# Patient Record
Sex: FEMALE | Race: WHITE | NOT HISPANIC OR LATINO | Employment: UNEMPLOYED | URBAN - METROPOLITAN AREA
[De-identification: names, ages, dates, MRNs, and addresses within clinical notes are randomized per-mention and may not be internally consistent; named-entity substitution may affect disease eponyms.]

---

## 2017-07-07 ENCOUNTER — TRANSCRIBE ORDERS (OUTPATIENT)
Dept: ADMINISTRATIVE | Facility: HOSPITAL | Age: 57
End: 2017-07-07

## 2017-07-07 DIAGNOSIS — D31.42 BENIGN NEOPLASM OF LEFT CILIARY BODY: ICD-10-CM

## 2017-07-07 DIAGNOSIS — H57.12 PAIN IN LEFT EYE: Primary | ICD-10-CM

## 2017-07-26 ENCOUNTER — HOSPITAL ENCOUNTER (OUTPATIENT)
Dept: CT IMAGING | Facility: HOSPITAL | Age: 57
Discharge: HOME | End: 2017-07-26
Payer: COMMERCIAL

## 2017-07-26 DIAGNOSIS — D31.42 BENIGN NEOPLASM OF LEFT CILIARY BODY: ICD-10-CM

## 2017-07-26 DIAGNOSIS — H57.12 PAIN IN LEFT EYE: ICD-10-CM

## 2017-07-26 PROCEDURE — 70480 CT ORBIT/EAR/FOSSA W/O DYE: CPT

## 2018-06-15 ENCOUNTER — OFFICE VISIT (OUTPATIENT)
Dept: FAMILY MEDICINE CLINIC | Facility: CLINIC | Age: 58
End: 2018-06-15
Payer: COMMERCIAL

## 2018-06-15 VITALS
RESPIRATION RATE: 18 BRPM | SYSTOLIC BLOOD PRESSURE: 126 MMHG | OXYGEN SATURATION: 98 % | WEIGHT: 169 LBS | HEART RATE: 76 BPM | TEMPERATURE: 98.7 F | BODY MASS INDEX: 33.18 KG/M2 | HEIGHT: 60 IN | DIASTOLIC BLOOD PRESSURE: 82 MMHG

## 2018-06-15 DIAGNOSIS — Z01.419 WELL WOMAN EXAM: ICD-10-CM

## 2018-06-15 DIAGNOSIS — Z12.39 BREAST CANCER SCREENING: ICD-10-CM

## 2018-06-15 DIAGNOSIS — Z13.220 NEED FOR LIPID SCREENING: ICD-10-CM

## 2018-06-15 DIAGNOSIS — E53.8 B12 DEFICIENCY: ICD-10-CM

## 2018-06-15 DIAGNOSIS — Z11.59 NEED FOR HEPATITIS C SCREENING TEST: ICD-10-CM

## 2018-06-15 DIAGNOSIS — Z13.1 DIABETES MELLITUS SCREENING: ICD-10-CM

## 2018-06-15 DIAGNOSIS — R05.9 COUGH: Primary | ICD-10-CM

## 2018-06-15 PROCEDURE — 99214 OFFICE O/P EST MOD 30 MIN: CPT | Performed by: FAMILY MEDICINE

## 2018-06-15 RX ORDER — MONTELUKAST SODIUM 10 MG/1
10 TABLET ORAL
Qty: 30 TABLET | Refills: 1 | Status: SHIPPED | OUTPATIENT
Start: 2018-06-15 | End: 2018-10-19 | Stop reason: SDUPTHER

## 2018-06-15 RX ORDER — DEXTROMETHORPHAN HYDROBROMIDE AND PROMETHAZINE HYDROCHLORIDE 15; 6.25 MG/5ML; MG/5ML
5 SYRUP ORAL 4 TIMES DAILY PRN
Qty: 118 ML | Refills: 1 | Status: SHIPPED | OUTPATIENT
Start: 2018-06-15 | End: 2019-05-02

## 2018-06-15 NOTE — PROGRESS NOTES
Assessment/Plan:    No problem-specific Assessment & Plan notes found for this encounter  Diagnoses and all orders for this visit:    Cough  after discussing risks and benefits of medication along with side effects will start Singulair at this time as well as cough medication and advised to use Flonase daily  -     montelukast (SINGULAIR) 10 mg tablet; Take 1 tablet (10 mg total) by mouth daily at bedtime for 30 days  -     promethazine-dextromethorphan (PHENERGAN-DM) 6 25-15 mg/5 mL oral syrup; Take 5 mL by mouth 4 (four) times a day as needed for cough  -     [facility]ergy Panel, Adult; Future  -     Food Specific IgG Allergy (Adult) Panel; Future    Need for lipid screening  -     Lipid panel; Future    Diabetes mellitus screening  -     Comprehensive metabolic panel; Future    Need for hepatitis C screening test  -     Hepatitis C antibody; Future    Breast cancer screening  -     Mammo screening bilateral w cad; Future    Well woman exam  -     Ambulatory referral to Obstetrics / Gynecology; Future      Follow up in 1 month if symptoms continue    Subjective:      Patient ID: Felisha Varela is a 66 y o  female  Cough  Patient complains of nasal congestion, nonproductive cough and sneezing  Symptoms began several weeks ago  Symptoms have been gradually worsening since that time  The cough is nonproductive and is aggravated by pollens  Associated symptoms include: postnasal drip  Patient does not have new pets  Patient does not have a history of asthma  Patient does have a history of environmental allergens  Patient has not traveled recently  Patient does not have a history of smoking  Patient has not had a previous chest x-ray  The following portions of the patient's history were reviewed and updated as appropriate:   She  has a past medical history of Seasonal allergies    She   Patient Active Problem List    Diagnosis Date Noted    Cough 06/15/2018    Need for lipid screening 06/15/2018    Diabetes mellitus screening 06/15/2018    Need for hepatitis C screening test 06/15/2018    Breast cancer screening 06/15/2018    Well woman exam 06/15/2018     She  has a past surgical history that includes  section and Knee surgery  Her family history is not on file  She  reports that she has never smoked  She does not have any smokeless tobacco history on file  She reports that she does not drink alcohol or use drugs  Current Outpatient Prescriptions   Medication Sig Dispense Refill    montelukast (SINGULAIR) 10 mg tablet Take 1 tablet (10 mg total) by mouth daily at bedtime for 30 days 30 tablet 1    promethazine-dextromethorphan (PHENERGAN-DM) 6 25-15 mg/5 mL oral syrup Take 5 mL by mouth 4 (four) times a day as needed for cough 118 mL 1     No current facility-administered medications for this visit  No current outpatient prescriptions on file prior to visit  No current facility-administered medications on file prior to visit  She has No Known Allergies       Review of Systems   Constitutional: Negative for activity change, appetite change, fatigue and fever  HENT: Positive for postnasal drip, rhinorrhea, sinus pain and sneezing  Negative for congestion and ear discharge  Respiratory: Positive for cough  Negative for shortness of breath  Cardiovascular: Negative for chest pain and palpitations  Gastrointestinal: Negative for diarrhea and nausea  Musculoskeletal: Negative for arthralgias and back pain  Skin: Negative for color change and rash  Neurological: Negative for dizziness and headaches  Psychiatric/Behavioral: Negative for agitation and behavioral problems  Objective:      /82   Pulse 76   Temp 98 7 °F (37 1 °C)   Resp 18   Ht 5' (1 524 m)   Wt 76 7 kg (169 lb)   SpO2 98%   BMI 33 01 kg/m²          Physical Exam   Constitutional: She is oriented to person, place, and time  She appears well-developed and well-nourished   No distress  HENT:   Head: Normocephalic and atraumatic  Nose: Nose normal    Mouth/Throat: Oropharynx is clear and moist    Eyes: Conjunctivae are normal  Pupils are equal, round, and reactive to light  Cardiovascular: Normal rate, regular rhythm and normal heart sounds  No murmur heard  Pulmonary/Chest: Effort normal and breath sounds normal  No respiratory distress  She has no wheezes  Abdominal: Soft  Bowel sounds are normal  She exhibits no distension  There is no tenderness  Neurological: She is alert and oriented to person, place, and time  Skin: Skin is warm and dry  No rash noted  She is not diaphoretic  No erythema  Psychiatric: She has a normal mood and affect

## 2018-08-23 ENCOUNTER — OFFICE VISIT (OUTPATIENT)
Dept: FAMILY MEDICINE CLINIC | Facility: CLINIC | Age: 58
End: 2018-08-23
Payer: COMMERCIAL

## 2018-08-23 ENCOUNTER — APPOINTMENT (OUTPATIENT)
Dept: LAB | Facility: CLINIC | Age: 58
End: 2018-08-23
Payer: COMMERCIAL

## 2018-08-23 ENCOUNTER — TRANSCRIBE ORDERS (OUTPATIENT)
Dept: ADMINISTRATIVE | Facility: HOSPITAL | Age: 58
End: 2018-08-23

## 2018-08-23 VITALS
HEIGHT: 60 IN | OXYGEN SATURATION: 99 % | HEART RATE: 74 BPM | BODY MASS INDEX: 32.98 KG/M2 | SYSTOLIC BLOOD PRESSURE: 120 MMHG | WEIGHT: 168 LBS | TEMPERATURE: 98.2 F | RESPIRATION RATE: 16 BRPM | DIASTOLIC BLOOD PRESSURE: 82 MMHG

## 2018-08-23 DIAGNOSIS — R05.9 COUGH: ICD-10-CM

## 2018-08-23 DIAGNOSIS — Z13.1 DIABETES MELLITUS SCREENING: ICD-10-CM

## 2018-08-23 DIAGNOSIS — J01.00 ACUTE MAXILLARY SINUSITIS, RECURRENCE NOT SPECIFIED: Primary | ICD-10-CM

## 2018-08-23 DIAGNOSIS — Z13.220 NEED FOR LIPID SCREENING: ICD-10-CM

## 2018-08-23 DIAGNOSIS — E53.8 B12 DEFICIENCY: ICD-10-CM

## 2018-08-23 DIAGNOSIS — Z11.59 NEED FOR HEPATITIS C SCREENING TEST: ICD-10-CM

## 2018-08-23 LAB
ALBUMIN SERPL BCP-MCNC: 3.6 G/DL (ref 3.5–5)
ALP SERPL-CCNC: 84 U/L (ref 46–116)
ALT SERPL W P-5'-P-CCNC: 17 U/L (ref 12–78)
ANION GAP SERPL CALCULATED.3IONS-SCNC: 7 MMOL/L (ref 4–13)
AST SERPL W P-5'-P-CCNC: 14 U/L (ref 5–45)
BILIRUB SERPL-MCNC: 0.44 MG/DL (ref 0.2–1)
BUN SERPL-MCNC: 9 MG/DL (ref 5–25)
CALCIUM SERPL-MCNC: 9 MG/DL (ref 8.3–10.1)
CHLORIDE SERPL-SCNC: 104 MMOL/L (ref 100–108)
CHOLEST SERPL-MCNC: 182 MG/DL (ref 50–200)
CO2 SERPL-SCNC: 26 MMOL/L (ref 21–32)
CREAT SERPL-MCNC: 0.79 MG/DL (ref 0.6–1.3)
GFR SERPL CREATININE-BSD FRML MDRD: 84 ML/MIN/1.73SQ M
GLUCOSE P FAST SERPL-MCNC: 92 MG/DL (ref 65–99)
HDLC SERPL-MCNC: 72 MG/DL (ref 40–60)
LDLC SERPL CALC-MCNC: 94 MG/DL (ref 0–100)
NONHDLC SERPL-MCNC: 110 MG/DL
POTASSIUM SERPL-SCNC: 3.6 MMOL/L (ref 3.5–5.3)
PROT SERPL-MCNC: 7.6 G/DL (ref 6.4–8.2)
SODIUM SERPL-SCNC: 137 MMOL/L (ref 136–145)
TRIGL SERPL-MCNC: 81 MG/DL
VIT B12 SERPL-MCNC: 275 PG/ML (ref 100–900)

## 2018-08-23 PROCEDURE — 82785 ASSAY OF IGE: CPT

## 2018-08-23 PROCEDURE — 80061 LIPID PANEL: CPT

## 2018-08-23 PROCEDURE — 1036F TOBACCO NON-USER: CPT | Performed by: FAMILY MEDICINE

## 2018-08-23 PROCEDURE — 82607 VITAMIN B-12: CPT

## 2018-08-23 PROCEDURE — 3008F BODY MASS INDEX DOCD: CPT | Performed by: FAMILY MEDICINE

## 2018-08-23 PROCEDURE — 86003 ALLG SPEC IGE CRUDE XTRC EA: CPT

## 2018-08-23 PROCEDURE — 80053 COMPREHEN METABOLIC PANEL: CPT

## 2018-08-23 PROCEDURE — 99214 OFFICE O/P EST MOD 30 MIN: CPT | Performed by: FAMILY MEDICINE

## 2018-08-23 PROCEDURE — 86803 HEPATITIS C AB TEST: CPT

## 2018-08-23 PROCEDURE — 36415 COLL VENOUS BLD VENIPUNCTURE: CPT

## 2018-08-23 RX ORDER — AMOXICILLIN AND CLAVULANATE POTASSIUM 875; 125 MG/1; MG/1
1 TABLET, FILM COATED ORAL EVERY 12 HOURS SCHEDULED
Qty: 10 TABLET | Refills: 0 | Status: SHIPPED | OUTPATIENT
Start: 2018-08-23 | End: 2018-08-28

## 2018-08-23 RX ORDER — METHYLPREDNISOLONE 4 MG/1
TABLET ORAL
Qty: 21 TABLET | Refills: 0 | Status: SHIPPED | OUTPATIENT
Start: 2018-08-23 | End: 2019-05-02

## 2018-08-23 NOTE — PROGRESS NOTES
Assessment/Plan:    No problem-specific Assessment & Plan notes found for this encounter  Diagnoses and all orders for this visit:    Acute maxillary sinusitis, recurrence not specified  Aftre discussing risks and benefits of medication along with side effects with patient  Will start Augmentin to cover for possible bacterial infection  Also Medrol Dosepak for inflammation  -     amoxicillin-clavulanate (AUGMENTIN) 875-125 mg per tablet; Take 1 tablet by mouth every 12 (twelve) hours for 5 days  -     Methylprednisolone 4 MG TBPK; Use as directed on package      Follow up in 1 week or as needed    Subjective:      Patient ID: Joycelyn Deleon is a 66 y o  female  Sinus Pain  Patient complains of headaches, nasal congestion, post nasal drip, sinus pressure and sore throat  Onset of symptoms was a few days ago  Symptoms have been gradually worsening since that time  She is drinking plenty of fluids  Past history is significant for nothing  Patient is non-smoker  She is currently taking Singulair for allergies along with Flonase nasal spray  The following portions of the patient's history were reviewed and updated as appropriate:   She  has a past medical history of Seasonal allergies and Torn medial meniscus  She   Patient Active Problem List    Diagnosis Date Noted    Acute maxillary sinusitis 2018    Cough 06/15/2018    Need for lipid screening 06/15/2018    Diabetes mellitus screening 06/15/2018    Need for hepatitis C screening test 06/15/2018    Breast cancer screening 06/15/2018    Well woman exam 06/15/2018     She  has a past surgical history that includes  section; Knee surgery; Replacement total knee; and Knee arthroscopy, medial patello femoral ligament repair  Her family history includes Hypertension in her mother; No Known Problems in her father  She  reports that she has never smoked   She has never used smokeless tobacco  She reports that she does not drink alcohol or use drugs  Current Outpatient Prescriptions   Medication Sig Dispense Refill    amoxicillin-clavulanate (AUGMENTIN) 875-125 mg per tablet Take 1 tablet by mouth every 12 (twelve) hours for 5 days 10 tablet 0    Methylprednisolone 4 MG TBPK Use as directed on package 21 tablet 0    montelukast (SINGULAIR) 10 mg tablet Take 1 tablet (10 mg total) by mouth daily at bedtime for 30 days 30 tablet 1    promethazine-dextromethorphan (PHENERGAN-DM) 6 25-15 mg/5 mL oral syrup Take 5 mL by mouth 4 (four) times a day as needed for cough (Patient not taking: Reported on 8/23/2018 ) 118 mL 1     No current facility-administered medications for this visit  Current Outpatient Prescriptions on File Prior to Visit   Medication Sig    montelukast (SINGULAIR) 10 mg tablet Take 1 tablet (10 mg total) by mouth daily at bedtime for 30 days    promethazine-dextromethorphan (PHENERGAN-DM) 6 25-15 mg/5 mL oral syrup Take 5 mL by mouth 4 (four) times a day as needed for cough (Patient not taking: Reported on 8/23/2018 )     No current facility-administered medications on file prior to visit  She has No Known Allergies       Review of Systems   Constitutional: Negative for activity change, appetite change, fatigue and fever  HENT: Positive for postnasal drip, sinus pain, sinus pressure and sore throat  Negative for congestion and ear discharge  Respiratory: Negative for cough and shortness of breath  Cardiovascular: Negative for chest pain and palpitations  Gastrointestinal: Negative for diarrhea and nausea  Musculoskeletal: Negative for arthralgias and back pain  Skin: Negative for color change and rash  Neurological: Negative for dizziness and headaches  Psychiatric/Behavioral: Negative for agitation and behavioral problems           Objective:      /82   Pulse 74   Temp 98 2 °F (36 8 °C) (Tympanic)   Resp 16   Ht 5' (1 524 m)   Wt 76 2 kg (168 lb)   SpO2 99%   BMI 32 81 kg/m² Physical Exam   Constitutional: She is oriented to person, place, and time  She appears well-developed and well-nourished  No distress  HENT:   Head: Normocephalic and atraumatic  Nose: Nose normal    Mouth/Throat: Oropharynx is clear and moist    Bilateral TM effusion  Thorax oropharynx clear no exudates  Eyes: Conjunctivae are normal  Pupils are equal, round, and reactive to light  Cardiovascular: Normal rate, regular rhythm and normal heart sounds  No murmur heard  Pulmonary/Chest: Effort normal and breath sounds normal  No respiratory distress  She has no wheezes  Abdominal: Soft  Bowel sounds are normal  She exhibits no distension  There is no tenderness  Neurological: She is alert and oriented to person, place, and time  Skin: Skin is warm and dry  No rash noted  She is not diaphoretic  No erythema  Psychiatric: She has a normal mood and affect

## 2018-08-24 LAB
ALLERGEN COMMENT: NORMAL
ALMOND IGE QN: <0.1 KUA/I
CASHEW NUT IGE QN: <0.1 KUA/I
CODFISH IGE QN: <0.1 KUA/I
EGG WHITE IGE QN: <0.1 KUA/I
GLUTEN IGE QN: <0.1 KUA/I
HAZELNUT IGE QN: <0.1 KUA/L
HCV AB SER QL: NORMAL
MILK IGE QN: <0.1 KUA/I
PEANUT IGE QN: <0.1 KUA/I
SALMON IGE QN: <0.1 KUA/I
SCALLOP IGE QN: <0.1 KUA/L
SESAME SEED IGE QN: <0.1 KUA/I
SHRIMP IGE QN: <0.1 KUA/L
SOYBEAN IGE QN: <0.1 KUA/I
TOTAL IGE SMQN RAST: 96.6 KU/L (ref 0–113)
TUNA IGE QN: <0.1 KUA/I
WALNUT IGE QN: <0.1 KUA/I
WHEAT IGE QN: <0.1 KUA/I

## 2018-08-26 LAB
A ALTERNATA IGE QN: <0.1 KUA/I
A FUMIGATUS IGE QN: <0.1 KUA/I
ALLERGEN COMMENT: ABNORMAL
BERMUDA GRASS IGE QN: <0.1 KUA/I
BOXELDER IGE QN: <0.1 KUA/I
C HERBARUM IGE QN: <0.1 KUA/I
CAT DANDER IGE QN: <0.1 KUA/I
CMN PIGWEED IGE QN: <0.1 KUA/I
COMMON RAGWEED IGE QN: <0.1 KUA/I
COTTONWOOD IGE QN: <0.1 KUA/I
D FARINAE IGE QN: <0.1 KUA/I
D PTERONYSS IGE QN: <0.1 KUA/I
DOG DANDER IGE QN: <0.1 KUA/I
LONDON PLANE IGE QN: <0.1 KUA/I
MOUSE URINE PROT IGE QN: <0.1 KUA/I
MT JUNIPER IGE QN: <0.1 KUA/I
MUGWORT IGE QN: <0.1 KUA/I
P NOTATUM IGE QN: <0.1 KUA/I
ROACH IGE QN: 0.13 KUA/I
SHEEP SORREL IGE QN: <0.1 KUA/I
SILVER BIRCH IGE QN: <0.1 KUA/I
TIMOTHY IGE QN: <0.1 KUA/I
TOTAL IGE SMQN RAST: 97 KU/L (ref 0–113)
WALNUT IGE QN: <0.1 KUA/I
WHITE ASH IGE QN: <0.1 KUA/I
WHITE ELM IGE QN: <0.1 KUA/I
WHITE MULBERRY IGE QN: <0.1 KUA/I
WHITE OAK IGE QN: <0.1 KUA/I

## 2018-10-19 DIAGNOSIS — R05.9 COUGH: ICD-10-CM

## 2018-10-19 RX ORDER — MONTELUKAST SODIUM 10 MG/1
20 TABLET ORAL
Qty: 30 TABLET | Refills: 0 | Status: SHIPPED | OUTPATIENT
Start: 2018-10-19 | End: 2019-05-02

## 2019-04-15 ENCOUNTER — OFFICE VISIT (OUTPATIENT)
Dept: OBGYN CLINIC | Facility: CLINIC | Age: 59
End: 2019-04-15
Payer: COMMERCIAL

## 2019-04-15 VITALS
SYSTOLIC BLOOD PRESSURE: 126 MMHG | HEIGHT: 60 IN | DIASTOLIC BLOOD PRESSURE: 78 MMHG | BODY MASS INDEX: 34.16 KG/M2 | WEIGHT: 174 LBS

## 2019-04-15 DIAGNOSIS — Z13.820 SCREENING FOR OSTEOPOROSIS: ICD-10-CM

## 2019-04-15 DIAGNOSIS — N39.3 STRESS INCONTINENCE OF URINE: ICD-10-CM

## 2019-04-15 DIAGNOSIS — Z78.0 MENOPAUSE: ICD-10-CM

## 2019-04-15 DIAGNOSIS — Z12.39 BREAST CANCER SCREENING: ICD-10-CM

## 2019-04-15 DIAGNOSIS — R35.0 URINARY FREQUENCY: ICD-10-CM

## 2019-04-15 DIAGNOSIS — N95.2 VAGINAL ATROPHY: Primary | ICD-10-CM

## 2019-04-15 PROCEDURE — 99202 OFFICE O/P NEW SF 15 MIN: CPT | Performed by: NURSE PRACTITIONER

## 2019-04-15 RX ORDER — ESTRADIOL 0.1 MG/G
1 CREAM VAGINAL DAILY
Qty: 42.5 G | Refills: 2 | Status: SHIPPED | OUTPATIENT
Start: 2019-04-15 | End: 2019-09-04 | Stop reason: ALTCHOICE

## 2019-04-17 DIAGNOSIS — N30.00 ACUTE CYSTITIS WITHOUT HEMATURIA: Primary | ICD-10-CM

## 2019-04-17 LAB — BACTERIA UR CULT: ABNORMAL

## 2019-04-17 RX ORDER — NITROFURANTOIN 25; 75 MG/1; MG/1
100 CAPSULE ORAL 2 TIMES DAILY
Qty: 10 CAPSULE | Refills: 0 | Status: SHIPPED | OUTPATIENT
Start: 2019-04-17 | End: 2019-04-22

## 2019-04-24 ENCOUNTER — HOSPITAL ENCOUNTER (OUTPATIENT)
Dept: MAMMOGRAPHY | Facility: CLINIC | Age: 59
Discharge: HOME | End: 2019-04-24
Payer: COMMERCIAL

## 2019-04-24 VITALS — BODY MASS INDEX: 33.38 KG/M2 | WEIGHT: 170 LBS | HEIGHT: 60 IN

## 2019-04-24 DIAGNOSIS — Z12.39 BREAST CANCER SCREENING: ICD-10-CM

## 2019-04-24 DIAGNOSIS — Z13.820 SCREENING FOR OSTEOPOROSIS: ICD-10-CM

## 2019-04-24 DIAGNOSIS — Z78.0 MENOPAUSE: ICD-10-CM

## 2019-04-24 PROCEDURE — 77067 SCR MAMMO BI INCL CAD: CPT

## 2019-04-24 PROCEDURE — 77080 DXA BONE DENSITY AXIAL: CPT

## 2019-04-24 PROCEDURE — 77063 BREAST TOMOSYNTHESIS BI: CPT

## 2019-05-02 ENCOUNTER — TELEPHONE (OUTPATIENT)
Dept: OTHER | Facility: OTHER | Age: 59
End: 2019-05-02

## 2019-05-02 ENCOUNTER — OFFICE VISIT (OUTPATIENT)
Dept: FAMILY MEDICINE CLINIC | Facility: CLINIC | Age: 59
End: 2019-05-02
Payer: COMMERCIAL

## 2019-05-02 VITALS
DIASTOLIC BLOOD PRESSURE: 80 MMHG | WEIGHT: 170 LBS | HEIGHT: 60 IN | OXYGEN SATURATION: 98 % | BODY MASS INDEX: 33.38 KG/M2 | TEMPERATURE: 101.4 F | SYSTOLIC BLOOD PRESSURE: 104 MMHG | HEART RATE: 118 BPM

## 2019-05-02 DIAGNOSIS — J01.00 ACUTE MAXILLARY SINUSITIS, RECURRENCE NOT SPECIFIED: Primary | ICD-10-CM

## 2019-05-02 DIAGNOSIS — Z12.11 COLON CANCER SCREENING: ICD-10-CM

## 2019-05-02 DIAGNOSIS — M81.0 OSTEOPOROSIS WITHOUT CURRENT PATHOLOGICAL FRACTURE, UNSPECIFIED OSTEOPOROSIS TYPE: ICD-10-CM

## 2019-05-02 PROBLEM — Z11.59 NEED FOR HEPATITIS C SCREENING TEST: Status: RESOLVED | Noted: 2018-06-15 | Resolved: 2019-05-02

## 2019-05-02 PROBLEM — Z13.220 NEED FOR LIPID SCREENING: Status: RESOLVED | Noted: 2018-06-15 | Resolved: 2019-05-02

## 2019-05-02 PROBLEM — Z13.1 DIABETES MELLITUS SCREENING: Status: RESOLVED | Noted: 2018-06-15 | Resolved: 2019-05-02

## 2019-05-02 PROBLEM — Z13.820 SCREENING FOR OSTEOPOROSIS: Status: RESOLVED | Noted: 2019-04-15 | Resolved: 2019-05-02

## 2019-05-02 PROBLEM — R05.9 COUGH: Status: RESOLVED | Noted: 2018-06-15 | Resolved: 2019-05-02

## 2019-05-02 PROCEDURE — 3008F BODY MASS INDEX DOCD: CPT | Performed by: FAMILY MEDICINE

## 2019-05-02 PROCEDURE — 99214 OFFICE O/P EST MOD 30 MIN: CPT | Performed by: FAMILY MEDICINE

## 2019-05-02 RX ORDER — AMOXICILLIN AND CLAVULANATE POTASSIUM 875; 125 MG/1; MG/1
1 TABLET, FILM COATED ORAL EVERY 12 HOURS SCHEDULED
Qty: 20 TABLET | Refills: 0 | Status: SHIPPED | OUTPATIENT
Start: 2019-05-02 | End: 2019-05-12

## 2019-05-14 ENCOUNTER — HOSPITAL ENCOUNTER (OUTPATIENT)
Dept: MAMMOGRAPHY | Facility: CLINIC | Age: 59
Discharge: HOME | End: 2019-05-14
Payer: COMMERCIAL

## 2019-05-14 ENCOUNTER — HOSPITAL ENCOUNTER (OUTPATIENT)
Dept: ULTRASOUND IMAGING | Facility: CLINIC | Age: 59
Discharge: HOME | End: 2019-05-14
Payer: COMMERCIAL

## 2019-05-14 ENCOUNTER — TRANSCRIBE ORDERS (OUTPATIENT)
Dept: MAMMOGRAPHY | Facility: CLINIC | Age: 59
End: 2019-05-14

## 2019-05-14 DIAGNOSIS — R92.8 ABNORMAL MAMMOGRAM: Primary | ICD-10-CM

## 2019-05-14 DIAGNOSIS — R92.8 ABNORMAL MAMMOGRAM: ICD-10-CM

## 2019-05-14 PROCEDURE — G0279 TOMOSYNTHESIS, MAMMO: HCPCS

## 2019-05-14 PROCEDURE — 77065 DX MAMMO INCL CAD UNI: CPT

## 2019-05-14 PROCEDURE — 76642 ULTRASOUND BREAST LIMITED: CPT

## 2019-05-21 ENCOUNTER — TELEPHONE (OUTPATIENT)
Dept: FAMILY MEDICINE CLINIC | Facility: CLINIC | Age: 59
End: 2019-05-21

## 2019-05-21 DIAGNOSIS — M81.0 OSTEOPOROSIS WITHOUT CURRENT PATHOLOGICAL FRACTURE, UNSPECIFIED OSTEOPOROSIS TYPE: Primary | ICD-10-CM

## 2019-05-21 RX ORDER — ALENDRONATE SODIUM 70 MG/1
70 TABLET ORAL
Qty: 4 TABLET | Refills: 5 | Status: SHIPPED | OUTPATIENT
Start: 2019-05-21 | End: 2020-12-04

## 2019-05-23 ENCOUNTER — TELEPHONE (OUTPATIENT)
Dept: FAMILY MEDICINE CLINIC | Facility: CLINIC | Age: 59
End: 2019-05-23

## 2019-05-23 DIAGNOSIS — J30.9 ALLERGIC RHINITIS, UNSPECIFIED SEASONALITY, UNSPECIFIED TRIGGER: Primary | ICD-10-CM

## 2019-05-23 RX ORDER — METHYLPREDNISOLONE 4 MG/1
TABLET ORAL
Qty: 21 EACH | Refills: 0 | Status: SHIPPED | OUTPATIENT
Start: 2019-05-23 | End: 2019-08-22

## 2019-08-21 NOTE — PROGRESS NOTES
Assessment/Plan:       Diagnoses and all orders for this visit:    Acute pain of right knee  -     Ambulatory referral to Sports Medicine; Future  -     XR knee 3 vw right non injury; Future    Old tear of medial meniscus of right knee, unspecified tear type  -     Ambulatory referral to Sports Medicine; Future  -     XR knee 3 vw right non injury; Future    Skin lesion of left lower extremity  -     Ambulatory referral to Dermatology; Future    Other orders  -     estradiol (ESTRACE) 0 1 mg/g vaginal cream        No problem-specific Assessment & Plan notes found for this encounter  Subjective:      Patient ID: Evin Campos is a 64 y o  female  Patient is here to discuss a few issues  In , she underwent arthroscopy repair for right knee torn meniscus  She had this done at [facility]  She was out of work for one week  When she returned, she was limping, wearing a brace and still having pain  Since that time, she has always had pain  Weather effects the pain  When the weather is cold and damp, pain is much worse  For the pain, she does take Aleve and Advil  She would like to be further evaluated by our Ortho  Also, she has developed darkened macules on her left leg and now one on her face  No symptoms  The following portions of the patient's history were reviewed and updated as appropriate:   She has a past medical history of Seasonal allergies and Torn medial meniscus  ,  does not have any pertinent problems on file  ,   has a past surgical history that includes  section; Knee surgery; Replacement total knee; and Knee arthroscopy, medial patello femoral ligament repair  ,  family history includes Colon cancer (age of onset: [de-identified]) in her maternal grandfather; Hypertension in her mother; No Known Problems in her daughter, daughter, father, maternal grandmother, and paternal grandmother; Pancreatic cancer (age of onset: 76) in her maternal aunt  ,   reports that she has never smoked  She has never used smokeless tobacco  She reports that she does not drink alcohol or use drugs  ,  has No Known Allergies     Current Outpatient Medications   Medication Sig Dispense Refill    alendronate (FOSAMAX) 70 mg tablet Take 1 tablet (70 mg total) by mouth every 7 days 4 tablet 5    denosumab (PROLIA) 60 mg/mL Inject 1 mL (60 mg total) under the skin once for 1 dose 1 mL 0    estradiol (ESTRACE) 0 1 mg/g vaginal cream Insert 1 g into the vagina daily for 14 days Then 1-3x a week maintenance dose  42 5 g 2    estradiol (ESTRACE) 0 1 mg/g vaginal cream        No current facility-administered medications for this visit  Review of Systems   Constitutional: Negative  Negative for fatigue and fever  HENT: Negative  Negative for congestion  Eyes: Negative  Negative for visual disturbance  Respiratory: Negative for cough, chest tightness, shortness of breath and wheezing  Cardiovascular: Negative  Gastrointestinal: Negative  Negative for abdominal pain, blood in stool, diarrhea and nausea  Endocrine: Negative for polydipsia, polyphagia and polyuria  Genitourinary: Negative for difficulty urinating and flank pain  Musculoskeletal: Negative for arthralgias, back pain and myalgias  Right knee pain   Skin: Positive for color change  Negative for pallor and rash  Dark pigmented macules left lower leg   Allergic/Immunologic: Negative for immunocompromised state  Neurological: Negative  Negative for dizziness, weakness, light-headedness, numbness and headaches  Hematological: Negative for adenopathy  Psychiatric/Behavioral: Negative  Negative for confusion, decreased concentration and sleep disturbance  All other systems reviewed and are negative          Objective:  Vitals:    08/22/19 1838   BP: 128/76   BP Location: Left arm   Patient Position: Sitting   Pulse: 102   Resp: 18   SpO2: 99%   Weight: 78 9 kg (174 lb)   Height: 5' (1 524 m)     Body mass index is 33 98 kg/m²  Physical Exam   Constitutional: She is oriented to person, place, and time  She appears well-developed and well-nourished  No distress  HENT:   Head: Normocephalic and atraumatic  Nose: Nose normal    Mouth/Throat: No oropharyngeal exudate  Eyes: Pupils are equal, round, and reactive to light  Conjunctivae are normal  No scleral icterus  Neck: Normal range of motion  Neck supple  No JVD present  Cardiovascular: Normal rate, regular rhythm, normal heart sounds and intact distal pulses  Exam reveals no gallop and no friction rub  No murmur heard  Pulmonary/Chest: Effort normal and breath sounds normal  No respiratory distress  Abdominal: Soft  Musculoskeletal: Normal range of motion  She exhibits tenderness  She exhibits no edema or deformity  Tenderness medial aspect of left knee  No clicks or pops  Neg ant drawer  Lymphadenopathy:     She has no cervical adenopathy  Neurological: She is alert and oriented to person, place, and time  Coordination normal    Skin: Skin is warm and dry  No rash noted  She is not diaphoretic  Superficial, dark pigmented macules on left lower leg  Single similar lesion left side of face   Psychiatric: She has a normal mood and affect  Her behavior is normal  Judgment and thought content normal    Nursing note and vitals reviewed  No

## 2019-08-22 ENCOUNTER — APPOINTMENT (OUTPATIENT)
Dept: RADIOLOGY | Facility: CLINIC | Age: 59
End: 2019-08-22
Payer: COMMERCIAL

## 2019-08-22 ENCOUNTER — OFFICE VISIT (OUTPATIENT)
Dept: FAMILY MEDICINE CLINIC | Facility: CLINIC | Age: 59
End: 2019-08-22
Payer: COMMERCIAL

## 2019-08-22 VITALS
DIASTOLIC BLOOD PRESSURE: 76 MMHG | WEIGHT: 174 LBS | HEART RATE: 102 BPM | HEIGHT: 60 IN | SYSTOLIC BLOOD PRESSURE: 128 MMHG | BODY MASS INDEX: 34.16 KG/M2 | RESPIRATION RATE: 18 BRPM | OXYGEN SATURATION: 99 %

## 2019-08-22 DIAGNOSIS — M25.561 ACUTE PAIN OF RIGHT KNEE: ICD-10-CM

## 2019-08-22 DIAGNOSIS — M25.561 ACUTE PAIN OF RIGHT KNEE: Primary | ICD-10-CM

## 2019-08-22 DIAGNOSIS — M23.203 OLD TEAR OF MEDIAL MENISCUS OF RIGHT KNEE, UNSPECIFIED TEAR TYPE: ICD-10-CM

## 2019-08-22 DIAGNOSIS — L98.9 SKIN LESION OF LEFT LOWER EXTREMITY: ICD-10-CM

## 2019-08-22 PROCEDURE — 1036F TOBACCO NON-USER: CPT | Performed by: NURSE PRACTITIONER

## 2019-08-22 PROCEDURE — 3008F BODY MASS INDEX DOCD: CPT | Performed by: NURSE PRACTITIONER

## 2019-08-22 PROCEDURE — 99214 OFFICE O/P EST MOD 30 MIN: CPT | Performed by: NURSE PRACTITIONER

## 2019-08-22 PROCEDURE — 73562 X-RAY EXAM OF KNEE 3: CPT

## 2019-08-22 RX ORDER — ESTRADIOL 0.1 MG/G
CREAM VAGINAL
COMMUNITY
Start: 2019-06-17 | End: 2019-09-04 | Stop reason: SDUPTHER

## 2019-08-22 NOTE — PATIENT INSTRUCTIONS
Acute right knee pain- h/o meniscal tear  Refer to Ortho  Obtain xray of knee prior to Ortho visit  May take Aleve or Advil for pain  Skin lesion left lower extremity  Vascular vs eczema

## 2019-08-29 ENCOUNTER — TELEPHONE (OUTPATIENT)
Dept: OBGYN CLINIC | Facility: HOSPITAL | Age: 59
End: 2019-08-29

## 2019-08-29 NOTE — TELEPHONE ENCOUNTER
Patient is calling to schedule for an appointment for the right knee and she is going to have the records sent over to us  Fax number given  Surgery was in 2015

## 2019-08-30 NOTE — TELEPHONE ENCOUNTER
Thank you  Reviewed results  It appears she had an arthroscopic right partial medial menisectomy in 2015   We will be happy to evaluate her

## 2019-09-04 ENCOUNTER — ANNUAL EXAM (OUTPATIENT)
Dept: OBGYN CLINIC | Facility: CLINIC | Age: 59
End: 2019-09-04
Payer: COMMERCIAL

## 2019-09-04 ENCOUNTER — TRANSCRIBE ORDERS (OUTPATIENT)
Dept: ADMINISTRATIVE | Facility: HOSPITAL | Age: 59
End: 2019-09-04

## 2019-09-04 ENCOUNTER — APPOINTMENT (OUTPATIENT)
Dept: LAB | Facility: HOSPITAL | Age: 59
End: 2019-09-04
Payer: COMMERCIAL

## 2019-09-04 VITALS
DIASTOLIC BLOOD PRESSURE: 90 MMHG | HEIGHT: 60 IN | BODY MASS INDEX: 33.57 KG/M2 | SYSTOLIC BLOOD PRESSURE: 130 MMHG | WEIGHT: 171 LBS

## 2019-09-04 DIAGNOSIS — Z11.3 SCREENING EXAMINATION FOR STD (SEXUALLY TRANSMITTED DISEASE): ICD-10-CM

## 2019-09-04 DIAGNOSIS — Z11.3 SCREENING EXAMINATION FOR VENEREAL DISEASE: ICD-10-CM

## 2019-09-04 DIAGNOSIS — N95.2 VAGINAL ATROPHY: ICD-10-CM

## 2019-09-04 DIAGNOSIS — Z01.419 ENCNTR FOR GYN EXAM (GENERAL) (ROUTINE) W/O ABN FINDINGS: Primary | ICD-10-CM

## 2019-09-04 DIAGNOSIS — Z78.0 MENOPAUSE: ICD-10-CM

## 2019-09-04 DIAGNOSIS — Z11.3 SCREENING EXAMINATION FOR VENEREAL DISEASE: Primary | ICD-10-CM

## 2019-09-04 PROCEDURE — 36415 COLL VENOUS BLD VENIPUNCTURE: CPT

## 2019-09-04 PROCEDURE — 86803 HEPATITIS C AB TEST: CPT | Performed by: NURSE PRACTITIONER

## 2019-09-04 PROCEDURE — 86695 HERPES SIMPLEX TYPE 1 TEST: CPT

## 2019-09-04 PROCEDURE — 86592 SYPHILIS TEST NON-TREP QUAL: CPT | Performed by: NURSE PRACTITIONER

## 2019-09-04 PROCEDURE — 87340 HEPATITIS B SURFACE AG IA: CPT

## 2019-09-04 PROCEDURE — S0612 ANNUAL GYNECOLOGICAL EXAMINA: HCPCS | Performed by: NURSE PRACTITIONER

## 2019-09-04 PROCEDURE — 86696 HERPES SIMPLEX TYPE 2 TEST: CPT

## 2019-09-04 PROCEDURE — 87389 HIV-1 AG W/HIV-1&-2 AB AG IA: CPT | Performed by: NURSE PRACTITIONER

## 2019-09-04 RX ORDER — ESTRADIOL 0.1 MG/G
1 CREAM VAGINAL 3 TIMES WEEKLY
Qty: 42.5 G | Refills: 3 | Status: SHIPPED | OUTPATIENT
Start: 2019-09-04 | End: 2020-12-04

## 2019-09-04 NOTE — PROGRESS NOTES
Assessment/Plan   Diagnoses and all orders for this visit:    Encntr for gyn exam (general) (routine) w/o abn findings  -     GP PAP + HPV PLUS + CT + GC    Screening examination for STD (sexually transmitted disease)  -     Hepatitis B surface antigen; Future  -     Hepatitis C antibody  -     HIV 1/2 AG-AB combo  -     RPR  -     Herpes I/II IgG BARBY w Reflex to HSV-2; Future  -     GP PAP + HPV PLUS + CT + GC    Vaginal atrophy  -     estradiol (ESTRACE) 0 1 mg/g vaginal cream; Insert 1 g into the vagina 3 (three) times a week    Menopause  -     estradiol (ESTRACE) 0 1 mg/g vaginal cream; Insert 1 g into the vagina 3 (three) times a week        Discussion    Reviewed normal exam today  Pap with GC/CT done  Rx for HIV/ Hep B/ Hep C/ RPR/ HSV per patient request   Rx for estrace cream sent to pharmacy  Normal breast exam today  Monthly SBEs advised  Mammograms yearly  Has follow up scan scheduled  Encourage at least 1200 mg calcium citrate + 2000 IUs vitamin D3 divided through diet and supplement throughout the day  Encourage 30-40 min weight bearing exercise most days of week  Colon cancer screening with a colonoscopy is recommended starting at age 39 and reviewed benefits  Will schedule appt with GI  Has appt with urology coming up to discuss incontinence  All questions have been answered to her satisfaction  RTO for annual or sooner if needed    Radha Nunez is a 64 y o  female who presents for annual well woman exam    Last exam 4-5 years ago Pap Normal per patient denies any hx of abnormal pap smears  Pap guidelines reviewed with patient  Pt would like Pap today  Pt denies any abnormal vaginal discharge, itching, or odor  Pt was started on Estrogen cream due to dyspareunia, stress incontinence due to vaginal atrophy  Has been using cream since April  Has not been sexually active with  for over a year  Thinks  may have been unfaithful   Unsure of how long this has been going on and would like STD testing  Also, would like to be checked for Herpes virus as ex- had hx of herpes and she is now concerned she may also have it  No recent intercourse with ex-  No lesions or sores on mouth or genital area  Estrogen cream has been helping with stress incontinence, but has not had intercourse yet  Pt in a mutually exclusive relationship () with a male partner and would like STD testing today  Menstrual Cycle:  LMP:   Denies any bleeding since menopause  Some occasional hot flashes since starting estrogen cream  Manageable  OB History     G 2 P 2  Contraception: Post-menopausal   Practices monthly SBEs, no breast complaints today  Last Mammogram 19 [facility] has 6 month follow up scheduled due to asymmetry in right breast   Colonoscopy 5 years ago, has referral, planning to schedule  Dexa Scan: Osteoporosis, taking Fosamax prescribed by PCP  Denies any bowel or bladder issues  Pt follows with PCP for regular check-ups and blood work  Review of Systems   All other systems reviewed and are negative        The following portions of the patient's history were reviewed and updated as appropriate: allergies, current medications, past family history, past medical history, past social history, past surgical history and problem list     Past Medical History:   Diagnosis Date    Seasonal allergies     Torn medial meniscus     2016  last assessed       Past Surgical History:   Procedure Laterality Date     SECTION      KNEE ARTHROSCOPY, MEDIAL PATELLO FEMORAL LIGAMENT REPAIR      patellar closed    KNEE SURGERY      with medial meniscus repair    REPLACEMENT TOTAL KNEE         Family History   Problem Relation Age of Onset    Hypertension Mother     No Known Problems Father     Colon cancer Maternal Grandfather [de-identified]    No Known Problems Daughter     No Known Problems Maternal Grandmother     No Known Problems Paternal Grandmother  Pancreatic cancer Maternal Aunt 76    No Known Problems Daughter     Breast cancer Neg Hx        Social History     Socioeconomic History    Marital status: /Civil Union     Spouse name: Not on file    Number of children: Not on file    Years of education: Not on file    Highest education level: Not on file   Occupational History    Not on file   Social Needs    Financial resource strain: Not on file    Food insecurity:     Worry: Not on file     Inability: Not on file    Transportation needs:     Medical: Not on file     Non-medical: Not on file   Tobacco Use    Smoking status: Never Smoker    Smokeless tobacco: Never Used   Substance and Sexual Activity    Alcohol use: No     Comment: social drinker in all scripts    Drug use: No    Sexual activity: Not on file   Lifestyle    Physical activity:     Days per week: Not on file     Minutes per session: Not on file    Stress: Not on file   Relationships    Social connections:     Talks on phone: Not on file     Gets together: Not on file     Attends Holiness service: Not on file     Active member of club or organization: Not on file     Attends meetings of clubs or organizations: Not on file     Relationship status: Not on file    Intimate partner violence:     Fear of current or ex partner: Not on file     Emotionally abused: Not on file     Physically abused: Not on file     Forced sexual activity: Not on file   Other Topics Concern    Not on file   Social History Narrative    Not on file         Current Outpatient Medications:     alendronate (FOSAMAX) 70 mg tablet, Take 1 tablet (70 mg total) by mouth every 7 days, Disp: 4 tablet, Rfl: 5    estradiol (ESTRACE) 0 1 mg/g vaginal cream, Insert 1 g into the vagina 3 (three) times a week, Disp: 42 5 g, Rfl: 3    No Known Allergies    Objective   Vitals:    09/04/19 1139   BP: 130/90   Weight: 77 6 kg (171 lb)   Height: 5' (1 524 m)     Physical Exam   Constitutional: She is oriented to person, place, and time  She appears well-developed and well-nourished  HENT:   Head: Normocephalic  Neck: Normal range of motion  Neck supple  No tracheal deviation present  No thyromegaly present  Cardiovascular: Normal rate, regular rhythm and normal heart sounds  Pulmonary/Chest: Effort normal and breath sounds normal  Right breast exhibits no inverted nipple, no mass, no nipple discharge, no skin change and no tenderness  Left breast exhibits no inverted nipple, no mass, no nipple discharge, no skin change and no tenderness  No breast tenderness, discharge or bleeding  Breasts are symmetrical    Abdominal: Soft  Bowel sounds are normal  She exhibits no distension and no mass  There is no tenderness  There is no rebound and no guarding  Genitourinary: Vagina normal and uterus normal  Rectal exam shows external hemorrhoid  Rectal exam shows guaiac negative stool  No breast tenderness, discharge or bleeding  No labial fusion  There is no rash, tenderness, lesion or injury on the right labia  There is no rash, tenderness, lesion or injury on the left labia  Cervix exhibits no motion tenderness, no discharge and no friability  Right adnexum displays no mass, no tenderness and no fullness  Left adnexum displays no mass, no tenderness and no fullness  Musculoskeletal: Normal range of motion  Neurological: She is alert and oriented to person, place, and time  Skin: Skin is warm and dry  Psychiatric: She has a normal mood and affect   Her behavior is normal  Judgment and thought content normal

## 2019-09-05 LAB
HBV SURFACE AG SER QL: NORMAL
HCV AB SER QL: NORMAL
HSV1 IGG SER IA-ACNC: <0.91 INDEX (ref 0–0.9)
HSV2 IGG SER IA-ACNC: 9.94 INDEX (ref 0–0.9)
RPR SER QL: NORMAL

## 2019-09-06 ENCOUNTER — OFFICE VISIT (OUTPATIENT)
Dept: OBGYN CLINIC | Facility: CLINIC | Age: 59
End: 2019-09-06
Payer: COMMERCIAL

## 2019-09-06 VITALS
BODY MASS INDEX: 36.23 KG/M2 | HEART RATE: 74 BPM | SYSTOLIC BLOOD PRESSURE: 137 MMHG | HEIGHT: 58 IN | DIASTOLIC BLOOD PRESSURE: 85 MMHG | WEIGHT: 172.6 LBS

## 2019-09-06 DIAGNOSIS — M25.561 ACUTE PAIN OF RIGHT KNEE: ICD-10-CM

## 2019-09-06 DIAGNOSIS — M17.11 PRIMARY OSTEOARTHRITIS OF RIGHT KNEE: Primary | ICD-10-CM

## 2019-09-06 DIAGNOSIS — M23.203 OLD TEAR OF MEDIAL MENISCUS OF RIGHT KNEE, UNSPECIFIED TEAR TYPE: ICD-10-CM

## 2019-09-06 LAB — HIV 1+2 AB+HIV1 P24 AG SERPL QL IA: NORMAL

## 2019-09-06 PROCEDURE — 20610 DRAIN/INJ JOINT/BURSA W/O US: CPT | Performed by: ORTHOPAEDIC SURGERY

## 2019-09-06 PROCEDURE — 99243 OFF/OP CNSLTJ NEW/EST LOW 30: CPT | Performed by: ORTHOPAEDIC SURGERY

## 2019-09-06 RX ORDER — BUPIVACAINE HYDROCHLORIDE 5 MG/ML
6 INJECTION, SOLUTION EPIDURAL; INTRACAUDAL
Status: COMPLETED | OUTPATIENT
Start: 2019-09-06 | End: 2019-09-06

## 2019-09-06 RX ORDER — TRIAMCINOLONE ACETONIDE 40 MG/ML
80 INJECTION, SUSPENSION INTRA-ARTICULAR; INTRAMUSCULAR
Status: COMPLETED | OUTPATIENT
Start: 2019-09-06 | End: 2019-09-06

## 2019-09-06 RX ADMIN — BUPIVACAINE HYDROCHLORIDE 6 ML: 5 INJECTION, SOLUTION EPIDURAL; INTRACAUDAL at 10:46

## 2019-09-06 RX ADMIN — TRIAMCINOLONE ACETONIDE 80 MG: 40 INJECTION, SUSPENSION INTRA-ARTICULAR; INTRAMUSCULAR at 10:46

## 2019-09-06 NOTE — PROGRESS NOTES
Assessment/Plan:  1  Primary osteoarthritis of right knee     2  Acute pain of right knee  Ambulatory referral to Sports Medicine   3  Old tear of medial meniscus of right knee, unspecified tear type  Ambulatory referral to [facility] and I engaged in a discussion today in the office in regards to her right knee pain  She is here as a referral from her PCP for her right knee complaints  X-rays demonstrate significant osteoarthritic changes which is consistent with her clinical exam today  Because she has tried a trial of anti-inflammatory medication as well as a rigorous home exercise program I recommended the patient undergo a corticosteroid injection  Patient elected to proceed with injection  Injection was provided in office today, which was tolerated well  Patient was advised to call the office if she does not see significant relief in the next week  Viscosupplementation may be warranted in the future  At this point time I will recommend that she modifies her activities as well as continues to work on her weight loss  Dorie Claude is a candidate for a total knee replacement in the future      Large joint arthrocentesis: R knee  Date/Time: 9/6/2019 10:46 AM  Consent given by: patient  Site marked: site marked  Timeout: Immediately prior to procedure a time out was called to verify the correct patient, procedure, equipment, support staff and site/side marked as required   Supporting Documentation  Indications: pain   Procedure Details  Location: knee - R knee  Preparation: Patient was prepped and draped in the usual sterile fashion  Needle size: 20 G  Ultrasound guidance: no  Approach: anterolateral  Medications administered: 6 mL bupivacaine (PF) 0 5 %; 80 mg triamcinolone acetonide 40 mg/mL    Patient tolerance: patient tolerated the procedure well with no immediate complications  Dressing:  Sterile dressing applied         Subjective:  Right knee pain    Patient ID: Ana lora 64 y o  female  HPI  Patient is a 61-year-old female who presents the office today for evaluation of her right knee  Patient is a consultation from her family physician in regard to her right knee pain  She has a past medical history of a medial meniscus tear with subsequent medial meniscectomy in 2015  Patient states since the surgery she has had continuous right knee pain to the anterior medial aspect of her knee  She states her pain is worse with colder weather as well as with activity  Her pain is constant nature and she describes it as dull and achy with rest  The pain increases to sharp pain with activity  She has tried to do her physical therapy home exercise program which has not provide her with any relief  She does take a combination of Aleve and Tylenol for her pain which she states does help at times  She denies any new injuries  She denies any numbness and tingling  She has not had any previous injections to the right knee  She denies any history of diabetes  She denies any history of using blood thinners  Review of Systems   Constitutional: Positive for activity change  Negative for chills, fever and unexpected weight change  HENT: Negative  Negative for hearing loss, nosebleeds and sore throat  Eyes: Negative  Negative for pain, redness and visual disturbance  Respiratory: Negative  Negative for cough, shortness of breath and wheezing  Cardiovascular: Negative  Negative for chest pain, palpitations and leg swelling  Gastrointestinal: Negative  Negative for abdominal pain, nausea and vomiting  Endocrine: Negative  Negative for polydipsia and polyuria  Genitourinary: Negative for dyspareunia and hematuria  Musculoskeletal: Positive for arthralgias and joint swelling  Negative for myalgias  Skin: Negative  Negative for rash and wound  Neurological: Negative  Negative for dizziness, numbness and headaches  Psychiatric/Behavioral: Negative    Negative for decreased concentration and suicidal ideas  The patient is not nervous/anxious  Past Medical History:   Diagnosis Date    Seasonal allergies     Torn medial meniscus     2016  last assessed       Past Surgical History:   Procedure Laterality Date     SECTION      KNEE ARTHROSCOPY, MEDIAL PATELLO FEMORAL LIGAMENT REPAIR      patellar closed    KNEE SURGERY      with medial meniscus repair    REPLACEMENT TOTAL KNEE         Family History   Problem Relation Age of Onset    Hypertension Mother     No Known Problems Father     Colon cancer Maternal Grandfather [de-identified]    No Known Problems Daughter     No Known Problems Maternal Grandmother     No Known Problems Paternal Grandmother     Pancreatic cancer Maternal Aunt 76    No Known Problems Daughter     Breast cancer Neg Hx        Social History     Occupational History    Not on file   Tobacco Use    Smoking status: Never Smoker    Smokeless tobacco: Never Used   Substance and Sexual Activity    Alcohol use: No     Comment: social drinker in all scripts    Drug use: No    Sexual activity: Not on file         Current Outpatient Medications:     alendronate (FOSAMAX) 70 mg tablet, Take 1 tablet (70 mg total) by mouth every 7 days, Disp: 4 tablet, Rfl: 5    estradiol (ESTRACE) 0 1 mg/g vaginal cream, Insert 1 g into the vagina 3 (three) times a week, Disp: 42 5 g, Rfl: 3    No Known Allergies    Objective:  Vitals:    19 0814   BP: 137/85   Pulse: 74       Body mass index is 36 07 kg/m²  Right Knee Exam     Tenderness   The patient is experiencing tenderness in the medial joint line (Anterior medial aspect)      Range of Motion   Extension: 0   Flexion: 130     Tests   Mark Anthony:  Medial - negative Lateral - negative  Varus: negative Valgus: negative  Lachman:  Anterior - negative      Drawer:  Anterior - negative    Posterior - negative    Other   Erythema: absent  Scars: present  Sensation: normal  Pulse: present  Swelling: none  Effusion: no effusion present    Comments:  Arthroscopic scars are well-healed  Parapatellar crepitus noted through range of motion  Positive patellofemoral grind  Knee is stable on exam          Observations     Right Knee   Negative for effusion  Physical Exam   Constitutional: She is oriented to person, place, and time  She appears well-developed and well-nourished  HENT:   Head: Normocephalic and atraumatic  Eyes: Pupils are equal, round, and reactive to light  Conjunctivae and EOM are normal    Neck: Normal range of motion  Neck supple  Cardiovascular: Normal rate and intact distal pulses  Pulmonary/Chest: Effort normal  No respiratory distress  Musculoskeletal:        Right knee: She exhibits no effusion  As noted in HPI   Neurological: She is alert and oriented to person, place, and time  Skin: Skin is warm and dry  Psychiatric: She has a normal mood and affect  Her behavior is normal    Nursing note and vitals reviewed  I have personally reviewed pertinent films in PACS  X-rays of the right knee obtained on 08/22/2019 demonstrate moderate osteoarthritis with tibial subluxation, sclerosis, and osteophyte formation  No lytic or blastic lesion  No fracture appreciated  Mariana FERREIRA    Division of Adult Reconstruction  Department of Orthopaedic Surgery  [facility]

## 2019-09-10 LAB
DEPRECATED C TRACH RRNA XXX QL PRB: NOT DETECTED
HPV HR 12 DNA CVX QL NAA+PROBE: NOT DETECTED
HPV16 DNA SPEC QL NAA+PROBE: NOT DETECTED
HPV18 DNA SPEC QL NAA+PROBE: NOT DETECTED
N GONORRHOEA DNA UR QL NAA+PROBE: NOT DETECTED
THIN PREP CVX: NORMAL

## 2019-09-12 ENCOUNTER — TELEPHONE (OUTPATIENT)
Dept: OBGYN CLINIC | Facility: CLINIC | Age: 59
End: 2019-09-12

## 2019-09-12 NOTE — TELEPHONE ENCOUNTER
Kvng Led returned call, PAP results given, not HSV  [facility] to call tomorrow

## 2019-11-04 ENCOUNTER — OFFICE VISIT (OUTPATIENT)
Dept: FAMILY MEDICINE CLINIC | Facility: CLINIC | Age: 59
End: 2019-11-04
Payer: COMMERCIAL

## 2019-11-04 VITALS
HEIGHT: 58 IN | BODY MASS INDEX: 35.68 KG/M2 | TEMPERATURE: 98.9 F | OXYGEN SATURATION: 99 % | SYSTOLIC BLOOD PRESSURE: 124 MMHG | DIASTOLIC BLOOD PRESSURE: 82 MMHG | WEIGHT: 170 LBS | HEART RATE: 80 BPM

## 2019-11-04 DIAGNOSIS — J01.00 ACUTE MAXILLARY SINUSITIS, RECURRENCE NOT SPECIFIED: Primary | ICD-10-CM

## 2019-11-04 PROBLEM — Z12.39 BREAST CANCER SCREENING: Status: RESOLVED | Noted: 2018-06-15 | Resolved: 2019-11-04

## 2019-11-04 PROBLEM — R35.0 URINARY FREQUENCY: Status: RESOLVED | Noted: 2019-04-15 | Resolved: 2019-11-04

## 2019-11-04 PROBLEM — Z01.419 WELL WOMAN EXAM: Status: RESOLVED | Noted: 2018-06-15 | Resolved: 2019-11-04

## 2019-11-04 PROBLEM — L98.9 SKIN LESION OF LEFT LOWER EXTREMITY: Status: RESOLVED | Noted: 2019-08-22 | Resolved: 2019-11-04

## 2019-11-04 PROCEDURE — 3008F BODY MASS INDEX DOCD: CPT | Performed by: FAMILY MEDICINE

## 2019-11-04 PROCEDURE — 99213 OFFICE O/P EST LOW 20 MIN: CPT | Performed by: FAMILY MEDICINE

## 2019-11-04 RX ORDER — AMOXICILLIN AND CLAVULANATE POTASSIUM 875; 125 MG/1; MG/1
1 TABLET, FILM COATED ORAL EVERY 12 HOURS SCHEDULED
Qty: 14 TABLET | Refills: 0 | Status: SHIPPED | OUTPATIENT
Start: 2019-11-04 | End: 2019-11-11

## 2019-11-04 NOTE — PROGRESS NOTES
Sherlyn Alvarez 1960 female MRN: [id number]      ASSESSMENT/PLAN  Problem List Items Addressed This Visit        Respiratory    Acute maxillary sinusitis - Primary    Relevant Medications    amoxicillin-clavulanate (AUGMENTIN) 875-125 mg per tablet        Discussed with pt that there is no indication for antibiotics at this time and that her symptoms are likely allergic (or possibly) viral in nature  Pt is very concerned that she will be sick when her grandchild is born next week  Will give script for Augmentin, and encouraged pt to only fill if she develops fever, worsening symptoms  Encouraged to continue Flonase, Claritin, nasal rinses  Future Appointments   Date Time Provider Joann Smith   11/5/2019  8:00 AM MO [facility]MO RBC 1 MO RBC Mammo MO RBC   12/6/2019  3:45 PM Clarita Bland DO [facility]          SUBJECTIVE  CC: Sinusitis      HPI:  Sherlyn Alvarez is a 64 y o  female who presents for an acute visit  5-6 days:   Low grade fever  Sinus pressure, rhinorrhea, nasal congestion, cough, sneezing, ear pressure    Takes Claritin, Flonase (but irregularly)  Symptoms started after mowing the lawn   No vomiting, diarrhea     Pt states that when she gets these symptoms, if she doesn't treat it early, she ends up getting a terrible cough that lasts for a month  Her daughter is having a baby next week and she is worried about being sick and not being able to be there for her daughter  She states she always ends up with an antibiotic in the end anyway, and would like to avoid getting worse  Review of Systems   Constitutional: Negative for chills and fever  HENT: Positive for congestion, ear pain (pressure), postnasal drip, rhinorrhea and sinus pressure  Negative for sore throat  Respiratory: Positive for cough  Gastrointestinal: Negative for diarrhea and vomiting         Historical Information   The patient history was reviewed and updated as follows:    Past Medical History: Diagnosis Date    Seasonal allergies     Torn medial meniscus     2016  last assessed     Past Surgical History:   Procedure Laterality Date     SECTION      KNEE ARTHROSCOPY, MEDIAL PATELLO FEMORAL LIGAMENT REPAIR      patellar closed    KNEE SURGERY      with medial meniscus repair    REPLACEMENT TOTAL KNEE       Family History   Problem Relation Age of Onset    Hypertension Mother     No Known Problems Father     Colon cancer Maternal Grandfather [de-identified]    No Known Problems Daughter     No Known Problems Maternal Grandmother     No Known Problems Paternal Grandmother     Pancreatic cancer Maternal Aunt 76    No Known Problems Daughter     Breast cancer Neg Hx       Social History   Social History     Substance and Sexual Activity   Alcohol Use No    Comment: social drinker in all scripts     Social History     Substance and Sexual Activity   Drug Use No     Social History     Tobacco Use   Smoking Status Never Smoker   Smokeless Tobacco Never Used       Medications:     Current Outpatient Medications:     alendronate (FOSAMAX) 70 mg tablet, Take 1 tablet (70 mg total) by mouth every 7 days, Disp: 4 tablet, Rfl: 5    amoxicillin-clavulanate (AUGMENTIN) 875-125 mg per tablet, Take 1 tablet by mouth every 12 (twelve) hours for 7 days, Disp: 14 tablet, Rfl: 0    estradiol (ESTRACE) 0 1 mg/g vaginal cream, Insert 1 g into the vagina 3 (three) times a week, Disp: 42 5 g, Rfl: 3  No Known Allergies    OBJECTIVE    Vitals:   Vitals:    19 0946   BP: 124/82   Pulse: 80   Temp: 98 9 °F (37 2 °C)   SpO2: 99%   Weight: 77 1 kg (170 lb)   Height: 4' 10" (1 473 m)           Physical Exam   Constitutional: She appears well-developed and well-nourished  No distress  HENT:   Head: Normocephalic and atraumatic     Right Ear: External ear normal    Left Ear: External ear normal    Nose: Nose normal    Mouth/Throat: Oropharynx is clear and moist    Eyes: Conjunctivae are normal    Neck: No thyromegaly present  Cardiovascular: Normal rate and regular rhythm  Pulmonary/Chest: Effort normal and breath sounds normal  No respiratory distress  Abdominal: Soft  Bowel sounds are normal  She exhibits no distension  There is no tenderness  Lymphadenopathy:     She has cervical adenopathy  Neurological: She is alert  Skin: Skin is warm and dry  Psychiatric: She has a normal mood and affect  Vitals reviewed                   DO [facility]   11/4/2019  10:08 AM

## 2019-11-05 ENCOUNTER — HOSPITAL ENCOUNTER (OUTPATIENT)
Dept: ULTRASOUND IMAGING | Facility: CLINIC | Age: 59
Discharge: HOME | End: 2019-11-05
Payer: COMMERCIAL

## 2019-11-05 ENCOUNTER — TRANSCRIBE ORDERS (OUTPATIENT)
Dept: MAMMOGRAPHY | Facility: CLINIC | Age: 59
End: 2019-11-05

## 2019-11-05 ENCOUNTER — HOSPITAL ENCOUNTER (OUTPATIENT)
Dept: MAMMOGRAPHY | Facility: CLINIC | Age: 59
Discharge: HOME | End: 2019-11-05
Payer: COMMERCIAL

## 2019-11-05 VITALS — HEIGHT: 59 IN | BODY MASS INDEX: 34.27 KG/M2 | WEIGHT: 170 LBS

## 2019-11-05 DIAGNOSIS — R92.8 ABNORMAL MAMMOGRAM: ICD-10-CM

## 2019-11-05 DIAGNOSIS — Z12.31 VISIT FOR SCREENING MAMMOGRAM: Primary | ICD-10-CM

## 2019-11-05 PROCEDURE — G0279 TOMOSYNTHESIS, MAMMO: HCPCS

## 2019-11-05 PROCEDURE — 77065 DX MAMMO INCL CAD UNI: CPT

## 2019-11-05 PROCEDURE — 76642 ULTRASOUND BREAST LIMITED: CPT

## 2019-11-26 ENCOUNTER — APPOINTMENT (OUTPATIENT)
Dept: RADIOLOGY | Facility: CLINIC | Age: 59
End: 2019-11-26
Payer: COMMERCIAL

## 2019-11-26 ENCOUNTER — OFFICE VISIT (OUTPATIENT)
Dept: OBGYN CLINIC | Facility: CLINIC | Age: 59
End: 2019-11-26
Payer: COMMERCIAL

## 2019-11-26 VITALS
SYSTOLIC BLOOD PRESSURE: 126 MMHG | BODY MASS INDEX: 33.87 KG/M2 | HEIGHT: 59 IN | DIASTOLIC BLOOD PRESSURE: 86 MMHG | WEIGHT: 168 LBS | HEART RATE: 69 BPM

## 2019-11-26 DIAGNOSIS — M23.91 PATELLAR MALALIGNMENT SYNDROME OF RIGHT KNEE: ICD-10-CM

## 2019-11-26 DIAGNOSIS — M25.562 CHRONIC PAIN OF LEFT KNEE: ICD-10-CM

## 2019-11-26 DIAGNOSIS — Z01.812 PRE-OPERATIVE LABORATORY EXAMINATION: ICD-10-CM

## 2019-11-26 DIAGNOSIS — G89.29 CHRONIC PAIN OF LEFT KNEE: ICD-10-CM

## 2019-11-26 DIAGNOSIS — E61.1 IRON DEFICIENCY: ICD-10-CM

## 2019-11-26 DIAGNOSIS — M17.11 PRIMARY OSTEOARTHRITIS OF RIGHT KNEE: ICD-10-CM

## 2019-11-26 DIAGNOSIS — M17.11 PRIMARY OSTEOARTHRITIS OF RIGHT KNEE: Primary | ICD-10-CM

## 2019-11-26 PROCEDURE — 99215 OFFICE O/P EST HI 40 MIN: CPT | Performed by: ORTHOPAEDIC SURGERY

## 2019-11-26 PROCEDURE — 73562 X-RAY EXAM OF KNEE 3: CPT

## 2019-11-26 RX ORDER — CHLORHEXIDINE GLUCONATE 0.12 MG/ML
15 RINSE ORAL ONCE
Status: CANCELLED | OUTPATIENT
Start: 2019-11-26 | End: 2019-11-26

## 2019-11-26 RX ORDER — FERROUS SULFATE TAB EC 324 MG (65 MG FE EQUIVALENT) 324 (65 FE) MG
324 TABLET DELAYED RESPONSE ORAL
Qty: 30 TABLET | Refills: 0 | Status: SHIPPED | OUTPATIENT
Start: 2019-11-26 | End: 2020-01-22 | Stop reason: HOSPADM

## 2019-11-26 RX ORDER — ZINC SULFATE 50(220)MG
220 CAPSULE ORAL DAILY
Qty: 30 CAPSULE | Refills: 0 | Status: SHIPPED | OUTPATIENT
Start: 2019-11-26 | End: 2020-01-22 | Stop reason: HOSPADM

## 2019-11-26 RX ORDER — MELATONIN
1000 DAILY
Qty: 30 TABLET | Refills: 0 | Status: SHIPPED | OUTPATIENT
Start: 2019-11-26 | End: 2020-01-22 | Stop reason: HOSPADM

## 2019-11-26 RX ORDER — FOLIC ACID 1 MG/1
1 TABLET ORAL DAILY
Qty: 30 TABLET | Refills: 0 | Status: SHIPPED | OUTPATIENT
Start: 2019-11-26 | End: 2020-01-22 | Stop reason: HOSPADM

## 2019-11-26 NOTE — PROGRESS NOTES
Assessment/Plan:  1  Primary osteoarthritis of right knee  XR knee 3 vw right non injury    Case request operating room: ARTHROPLASTY KNEE TOTAL    Comprehensive metabolic panel    Hemoglobin A1C W/EAG Estimation    CBC and differential    Iron Panel (Includes Iron Saturation, Iron, and TIBC)    Protime-INR    APTT    Type and screen    Ambulatory referral to [facility]    Ambulatory referral to Physical Therapy    If Symptomatic, order: UA w Reflex to Microscopic w Reflex to Culture    MRSA culture    Ambulatory referral to Cardiology    EKG 12 lead    XR chest pa & lateral    Case request operating room: ARTHROPLASTY KNEE TOTAL    zinc sulfate (ZINCATE) 220 mg capsule    ferrous sulfate 324 (65 Fe) mg    folic acid (FOLVITE) 1 mg tablet    cholecalciferol (VITAMIN D3) 1,000 units tablet    ascorbic Acid (VITAMIN C) 500 MG CPCR   2  Chronic pain of left knee  XR knee 3 vw right non injury    Case request operating room: ARTHROPLASTY KNEE TOTAL    Comprehensive metabolic panel    Hemoglobin A1C W/EAG Estimation    CBC and differential    Iron Panel (Includes Iron Saturation, Iron, and TIBC)    Protime-INR    APTT    Type and screen    Ambulatory referral to [facility]    Ambulatory referral to Physical Therapy    If Symptomatic, order: UA w Reflex to Microscopic w Reflex to Culture    MRSA culture    Ambulatory referral to Cardiology    EKG 12 lead    XR chest pa & lateral    Case request operating room: ARTHROPLASTY KNEE TOTAL    zinc sulfate (ZINCATE) 220 mg capsule    ferrous sulfate 324 (65 Fe) mg    folic acid (FOLVITE) 1 mg tablet    cholecalciferol (VITAMIN D3) 1,000 units tablet    ascorbic Acid (VITAMIN C) 500 MG CPCR   3  Patellar malalignment syndrome of right knee  Ambulatory referral to [facility]    Ambulatory referral to Cardiology   4  Iron deficiency  Ambulatory referral to [facility]    Ambulatory referral to Cardiology   5   Pre-operative laboratory examination  Comprehensive metabolic panel    Hemoglobin A1C W/EAG Estimation    CBC and differential    Iron Panel (Includes Iron Saturation, Iron, and TIBC)    Protime-INR    APTT    Type and screen    Ambulatory referral to [facility]    Ambulatory referral to Physical Therapy    If Symptomatic, order: UA w Reflex to Microscopic w Reflex to Culture    MRSA culture    Ambulatory referral to Cardiology    EKG 12 lead    XR chest pa & lateral     Scribe Attestation    I,:   Dariusz Perez am acting as a scribe while in the presence of the attending physician :        I,:   Tiffanie Napier DO personally performed the services described in this documentation    as scribed in my presence :          Tia Blanca is a very pleasant 44-year-old female who presents today for follow-up evaluation of her chronic right knee pain  Unfortunately, she failed to find lasting relief following her most recent corticosteroid injection  She is suffering with severe osteoarthritis with a near complete loss of joint space medially and end-stage arthritic change in the patellofemoral joint with maltracking and an underlying meniscus tear  At this point, she has failed extensive conservative management including activity modification, maintenance of appropriate weight, physical therapy and home exercise program, analgesics and anti-inflammatory medications, and corticosteroid injection and continues to struggle with daily pain that limits her activities of daily living and enjoyment  Due to this and due to her underlying pathologies, I recommended that she undergo a right total knee arthroplasty to address all of her pathologies with one procedure  The pre sinai and postoperative expectations were discussed here in the office today  The risks and benefits of undergoing right total knee arthroplasty were discussed at length and consents were signed and placed in the chart  Please see risk discussion below    The patient denies a history of DVT/PE, diabetes, malignancy, MRSA infection, GI bleeding or peptic ulcer  Her hormone replacement treatment is topical only and does not increase her risk for postoperative DVT  She does understand that she will need clearance from her primary care physician and cardiologist   She is an excellent candidate for outpatient physical therapy  All of her questions and concerns were addressed today and she will meet with my surgery scheduler to pick a date for her procedure and make preoperative arrangements  I look forward to seeing her back in the office postoperatively, 2 weeks after surgery  Subjective: Follow-up evaluation for chronic right knee pain    Patient ID: Diana Trotter is a 64 y o  female  HPI  Tanya Lassiter is a very pleasant 21-year-old female who presents today for follow-up evaluation for chronic right knee pain  At her last appointment in September of 2018 a corticosteroid injection was administered for pain  She states that she received approximately one week of relief following this injection  At that time, she had a return her activity related right knee pain  At today's visit, she states that she has continued to experience a severe aching pain about the medial aspect of her knee that is worse with activity and only somewhat better at rest   She has failed to find lasting relief despite extensive conservative treatment and does state that she is ready to undergo a total knee replacement  She denies any new injury or trauma  She denies any distal paresthesias of the lower extremity  She denies any mechanical symptoms about the knee  Review of Systems   Constitutional: Positive for activity change  Negative for chills, fever and unexpected weight change  HENT: Negative for hearing loss, nosebleeds and sore throat  Eyes: Negative for pain, redness and visual disturbance  Respiratory: Negative for cough, shortness of breath and wheezing      Cardiovascular: Negative for chest pain, palpitations and leg swelling  Gastrointestinal: Negative for abdominal pain, nausea and vomiting  Endocrine: Negative for polydipsia and polyuria  Genitourinary: Negative for dysuria and hematuria  Musculoskeletal: Positive for arthralgias and gait problem  Negative for joint swelling and myalgias  See HPI   Skin: Negative for rash and wound  Neurological: Negative for dizziness, numbness and headaches  Psychiatric/Behavioral: Negative for decreased concentration and suicidal ideas  The patient is not nervous/anxious            Past Medical History:   Diagnosis Date    Seasonal allergies     Skin lesion of left lower extremity 2019    Torn medial meniscus     2016  last assessed       Past Surgical History:   Procedure Laterality Date     SECTION      KNEE ARTHROSCOPY, MEDIAL PATELLO FEMORAL LIGAMENT REPAIR      patellar closed    KNEE SURGERY      with medial meniscus repair    REPLACEMENT TOTAL KNEE         Family History   Problem Relation Age of Onset    Hypertension Mother     No Known Problems Father     Colon cancer Maternal Grandfather [de-identified]    No Known Problems Daughter     No Known Problems Maternal Grandmother     No Known Problems Paternal Grandmother     Pancreatic cancer Maternal Aunt 76    No Known Problems Daughter     No Known Problems Paternal Aunt     Breast cancer Neg Hx        Social History     Occupational History    Not on file   Tobacco Use    Smoking status: Never Smoker    Smokeless tobacco: Never Used   Substance and Sexual Activity    Alcohol use: No     Comment: social drinker in all scripts    Drug use: No    Sexual activity: Not on file         Current Outpatient Medications:     alendronate (FOSAMAX) 70 mg tablet, Take 1 tablet (70 mg total) by mouth every 7 days, Disp: 4 tablet, Rfl: 5    estradiol (ESTRACE) 0 1 mg/g vaginal cream, Insert 1 g into the vagina 3 (three) times a week, Disp: 42 5 g, Rfl: 3    ascorbic Acid (VITAMIN C) 500 MG CPCR, Take 1 capsule (500 mg total) by mouth 2 (two) times a day, Disp: 60 capsule, Rfl: 0    cholecalciferol (VITAMIN D3) 1,000 units tablet, Take 1 tablet (1,000 Units total) by mouth daily, Disp: 30 tablet, Rfl: 0    ferrous sulfate 324 (65 Fe) mg, Take 1 tablet (324 mg total) by mouth daily before breakfast, Disp: 30 tablet, Rfl: 0    folic acid (FOLVITE) 1 mg tablet, Take 1 tablet (1 mg total) by mouth daily, Disp: 30 tablet, Rfl: 0    zinc sulfate (ZINCATE) 220 mg capsule, Take 1 capsule (220 mg total) by mouth daily, Disp: 30 capsule, Rfl: 0    No Known Allergies    Objective:  Vitals:    11/26/19 0948   BP: 126/86   Pulse: 69       Body mass index is 33 93 kg/m²  Right Knee Exam     Tenderness   The patient is experiencing tenderness in the medial joint line and patella  Range of Motion   Right knee extension: 0  Right knee flexion: 130  Tests   Varus: negative Valgus: negative  Drawer:  Anterior - negative    Posterior - negative  Patellar apprehension: trace    Other   Erythema: absent  Scars: present (Well-healed arthroscopic portals)  Sensation: normal  Pulse: present  Swelling: none  Effusion: no effusion present    Comments:  Stable at 0, 30 and 90°  Neurovascularly intact distally   No warmth, erythema or signs of infection  Parapatellar crepitance noted  Patellofemoral grind:  Positive  Patella tracks laterally-with significant crepitance          Observations     Right Knee   Negative for effusion  Physical Exam   Constitutional: She is oriented to person, place, and time  She appears well-developed and well-nourished  HENT:   Head: Normocephalic and atraumatic  Eyes: Pupils are equal, round, and reactive to light  Conjunctivae and EOM are normal    Neck: Normal range of motion  Neck supple  Cardiovascular: Normal rate and intact distal pulses  Pulmonary/Chest: Effort normal  No respiratory distress  Musculoskeletal:        Right knee: She exhibits no effusion     As noted in HPI   Neurological: She is alert and oriented to person, place, and time  Skin: Skin is warm and dry  Psychiatric: She has a normal mood and affect  Her behavior is normal    Nursing note and vitals reviewed  I have personally reviewed pertinent films in PACS  X-ray of the right knee obtained on 11/26/2019 shows severe osteoarthritis as evidenced by joint space narrowing with near bone-on-bone contact in the medial compartment, end-stage changes in the patellofemoral compartment, sclerosis, and marginal osteophytosis  The patient was counseled in detail regarding the diagnosis, the treatment options available, the prognosis of each treatment option, the potential risks and complications  These are, but are not limited to; deep vein thrombosis, pulmonary embolism, neurologic and vascular injury, infection, instability, leg length discrepancy, dislocation, hematoma, reflex sympathetic dystrophy, loss of range of motion, ankylosis of the knee, fracture, screw or prosthetic perforation, chronic pain, acute pain, chronic leg pain and edema, loosening, death, heart attack, and stroke  The patient's questions were answered in detail  The patient demonstrates understanding of these risks and wishes to proceed with surgery

## 2019-12-26 ENCOUNTER — TRANSCRIBE ORDERS (OUTPATIENT)
Dept: ADMINISTRATIVE | Facility: HOSPITAL | Age: 59
End: 2019-12-26

## 2019-12-26 ENCOUNTER — APPOINTMENT (OUTPATIENT)
Dept: PREADMISSION TESTING | Facility: HOSPITAL | Age: 59
End: 2019-12-26
Payer: COMMERCIAL

## 2019-12-26 ENCOUNTER — APPOINTMENT (OUTPATIENT)
Dept: LAB | Facility: HOSPITAL | Age: 59
End: 2019-12-26
Payer: COMMERCIAL

## 2019-12-26 ENCOUNTER — HOSPITAL ENCOUNTER (OUTPATIENT)
Dept: RADIOLOGY | Facility: HOSPITAL | Age: 59
Discharge: HOME | End: 2019-12-26
Payer: COMMERCIAL

## 2019-12-26 DIAGNOSIS — M17.11 PRIMARY OSTEOARTHRITIS OF RIGHT KNEE: ICD-10-CM

## 2019-12-26 DIAGNOSIS — M25.562 CHRONIC PAIN OF LEFT KNEE: ICD-10-CM

## 2019-12-26 DIAGNOSIS — Z01.818 PREOP TESTING: Primary | ICD-10-CM

## 2019-12-26 DIAGNOSIS — Z01.812 PRE-OPERATIVE LABORATORY EXAMINATION: ICD-10-CM

## 2019-12-26 DIAGNOSIS — G89.29 CHRONIC PAIN OF LEFT KNEE: ICD-10-CM

## 2019-12-26 LAB
ABO GROUP BLD: NORMAL
ALBUMIN SERPL BCP-MCNC: 4 G/DL (ref 3.5–5)
ALP SERPL-CCNC: 98 U/L (ref 46–116)
ALT SERPL W P-5'-P-CCNC: 18 U/L (ref 12–78)
ANION GAP SERPL CALCULATED.3IONS-SCNC: 7 MMOL/L (ref 4–13)
APTT PPP: 29 SECONDS (ref 23–37)
AST SERPL W P-5'-P-CCNC: 15 U/L (ref 5–45)
BASOPHILS # BLD AUTO: 0.03 THOUSANDS/ΜL (ref 0–0.1)
BASOPHILS NFR BLD AUTO: 0 % (ref 0–1)
BILIRUB SERPL-MCNC: 0.4 MG/DL (ref 0.2–1)
BLD GP AB SCN SERPL QL: NEGATIVE
BUN SERPL-MCNC: 9 MG/DL (ref 5–25)
CALCIUM SERPL-MCNC: 9.1 MG/DL (ref 8.3–10.1)
CHLORIDE SERPL-SCNC: 103 MMOL/L (ref 100–108)
CO2 SERPL-SCNC: 29 MMOL/L (ref 21–32)
CREAT SERPL-MCNC: 0.7 MG/DL (ref 0.6–1.3)
EOSINOPHIL # BLD AUTO: 0.09 THOUSAND/ΜL (ref 0–0.61)
EOSINOPHIL NFR BLD AUTO: 1 % (ref 0–6)
ERYTHROCYTE [DISTWIDTH] IN BLOOD BY AUTOMATED COUNT: 12.5 % (ref 11.6–15.1)
EST. AVERAGE GLUCOSE BLD GHB EST-MCNC: 114 MG/DL
FERRITIN SERPL-MCNC: 86 NG/ML (ref 8–388)
GFR SERPL CREATININE-BSD FRML MDRD: 96 ML/MIN/1.73SQ M
GLUCOSE P FAST SERPL-MCNC: 97 MG/DL (ref 65–99)
HBA1C MFR BLD: 5.6 % (ref 4.2–6.3)
HCT VFR BLD AUTO: 39.5 % (ref 34.8–46.1)
HGB BLD-MCNC: 13 G/DL (ref 11.5–15.4)
IMM GRANULOCYTES # BLD AUTO: 0.02 THOUSAND/UL (ref 0–0.2)
IMM GRANULOCYTES NFR BLD AUTO: 0 % (ref 0–2)
INR PPP: 0.92 (ref 0.91–1.09)
IRON SATN MFR SERPL: 22 %
IRON SERPL-MCNC: 61 UG/DL (ref 50–170)
LYMPHOCYTES # BLD AUTO: 1.83 THOUSANDS/ΜL (ref 0.6–4.47)
LYMPHOCYTES NFR BLD AUTO: 26 % (ref 14–44)
MCH RBC QN AUTO: 30.2 PG (ref 26.8–34.3)
MCHC RBC AUTO-ENTMCNC: 32.9 G/DL (ref 31.4–37.4)
MCV RBC AUTO: 92 FL (ref 82–98)
MONOCYTES # BLD AUTO: 0.48 THOUSAND/ΜL (ref 0.17–1.22)
MONOCYTES NFR BLD AUTO: 7 % (ref 4–12)
NEUTROPHILS # BLD AUTO: 4.64 THOUSANDS/ΜL (ref 1.85–7.62)
NEUTS SEG NFR BLD AUTO: 66 % (ref 43–75)
NRBC BLD AUTO-RTO: 0 /100 WBCS
PLATELET # BLD AUTO: 278 THOUSANDS/UL (ref 149–390)
PMV BLD AUTO: 10.2 FL (ref 8.9–12.7)
POTASSIUM SERPL-SCNC: 3.8 MMOL/L (ref 3.5–5.3)
PROT SERPL-MCNC: 8.1 G/DL (ref 6.4–8.2)
PROTHROMBIN TIME: 9.9 SECONDS (ref 9.8–12)
RBC # BLD AUTO: 4.3 MILLION/UL (ref 3.81–5.12)
RH BLD: POSITIVE
SODIUM SERPL-SCNC: 139 MMOL/L (ref 136–145)
SPECIMEN EXPIRATION DATE: NORMAL
TIBC SERPL-MCNC: 282 UG/DL (ref 250–450)
WBC # BLD AUTO: 7.09 THOUSAND/UL (ref 4.31–10.16)

## 2019-12-26 PROCEDURE — 86850 RBC ANTIBODY SCREEN: CPT

## 2019-12-26 PROCEDURE — 82728 ASSAY OF FERRITIN: CPT

## 2019-12-26 PROCEDURE — 86900 BLOOD TYPING SEROLOGIC ABO: CPT

## 2019-12-26 PROCEDURE — 36415 COLL VENOUS BLD VENIPUNCTURE: CPT | Performed by: PHYSICIAN ASSISTANT

## 2019-12-26 PROCEDURE — 85730 THROMBOPLASTIN TIME PARTIAL: CPT

## 2019-12-26 PROCEDURE — 85025 COMPLETE CBC W/AUTO DIFF WBC: CPT

## 2019-12-26 PROCEDURE — 93005 ELECTROCARDIOGRAM TRACING: CPT

## 2019-12-26 PROCEDURE — 80053 COMPREHEN METABOLIC PANEL: CPT

## 2019-12-26 PROCEDURE — 83036 HEMOGLOBIN GLYCOSYLATED A1C: CPT | Performed by: PHYSICIAN ASSISTANT

## 2019-12-26 PROCEDURE — 83550 IRON BINDING TEST: CPT

## 2019-12-26 PROCEDURE — 85610 PROTHROMBIN TIME: CPT

## 2019-12-26 PROCEDURE — 87081 CULTURE SCREEN ONLY: CPT

## 2019-12-26 PROCEDURE — 71046 X-RAY EXAM CHEST 2 VIEWS: CPT

## 2019-12-26 PROCEDURE — 83540 ASSAY OF IRON: CPT

## 2019-12-26 PROCEDURE — 86901 BLOOD TYPING SEROLOGIC RH(D): CPT

## 2019-12-26 NOTE — PRE-PROCEDURE INSTRUCTIONS
My Surgical Experience    The following information was developed to assist you to prepare for your operation  What do I need to do before coming to the hospital?   Arrange for a responsible person to drive you to and from the hospital    Arrange care for your children at home  Children are not allowed in the recovery areas of the hospital   Plan to wear clothing that is easy to put on and take off  If you are having shoulder surgery, wear a shirt that buttons or zippers in the front  Bathing  o Shower the evening before and the morning of your surgery with an antibacterial soap  Please refer to the Pre Op Showering Instructions for Surgery Patients Sheet   o Remove nail polish and all body piercing jewelry  o Do not shave any body part for at least 24 hours before surgery-this includes face, arms, legs and upper body  Food  o Nothing to eat or drink after midnight the night before your surgery  This includes candy and chewing gum  o Exception: If your surgery is after 12:00pm (noon), you may have clear liquids such as 7-Up®, ginger ale, apple or cranberry juice, Jell-O®, water, or clear broth until 8:00 am  o Do not drink milk or juice with pulp on the morning before surgery  o Do not drink alcohol 24 hours before surgery  Medicine  o Follow instructions you received from your surgeon about which medicines you may take on the day of surgery  o If instructed to take medicine on the morning of surgery, take pills with just a small sip of water  Call your prescribing doctor for specific infroamtion on what to do if you take insulin    What should I bring to the hospital?    Bring:  Francheska Orosco or a walker, if you have them, for foot or knee surgery   A list of the daily medicines, vitamins, minerals, herbals and nutritional supplements you take   Include the dosages of medicines and the time you take them each day   Glasses, dentures or hearing aids   Minimal clothing; you will be wearing hospital sleepwear   Photo ID; required to verify your identity   If you have a Living Will or Power of , bring a copy of the documents   If you have an ostomy, bring an extra pouch and any supplies you use    Do not bring   Medicines or inhalers   Money, valuables or jewelry    What other information should I know about the day of surgery?  Notify your surgeons if you develop a cold, sore throat, cough, fever, rash or any other illness   Report to the Ambulatory Surgical/Same Day Surgery Unit   You will be instructed to stop at Registration only if you have not been pre-registered   Inform your  fi they do not stay that they will be asked by the staff to leave a phone number where they can be reached   Be available to be reached before surgery  In the event the operating room schedule changes, you may be asked to come in earlier or later than expected    *It is important to tell your doctor and others involved in your health care if you are taking or have been taking any non-prescription drugs, vitamins, minerals, herbals or other nutritional supplements  Any of these may interact with some food or medicines and cause a reaction      Pre-Surgery Instructions:   Medication Instructions    alendronate (FOSAMAX) 70 mg tablet Instructed patient per Anesthesia Guidelines   ascorbic Acid (VITAMIN C) 500 MG CPCR Instructed patient per Anesthesia Guidelines   cholecalciferol (VITAMIN D3) 1,000 units tablet Instructed patient per Anesthesia Guidelines   estradiol (ESTRACE) 0 1 mg/g vaginal cream Instructed patient per Anesthesia Guidelines   ferrous sulfate 324 (65 Fe) mg Instructed patient per Anesthesia Guidelines   folic acid (FOLVITE) 1 mg tablet Instructed patient per Anesthesia Guidelines   zinc sulfate (ZINCATE) 220 mg capsule Instructed patient per Anesthesia Guidelines

## 2019-12-27 ENCOUNTER — OFFICE VISIT (OUTPATIENT)
Dept: FAMILY MEDICINE CLINIC | Facility: CLINIC | Age: 59
End: 2019-12-27
Payer: COMMERCIAL

## 2019-12-27 VITALS
DIASTOLIC BLOOD PRESSURE: 86 MMHG | SYSTOLIC BLOOD PRESSURE: 122 MMHG | OXYGEN SATURATION: 100 % | TEMPERATURE: 98.8 F | HEART RATE: 98 BPM | HEIGHT: 59 IN | BODY MASS INDEX: 34.39 KG/M2 | WEIGHT: 170.6 LBS

## 2019-12-27 DIAGNOSIS — J01.00 ACUTE NON-RECURRENT MAXILLARY SINUSITIS: Primary | ICD-10-CM

## 2019-12-27 LAB — MRSA NOSE QL CULT: NORMAL

## 2019-12-27 PROCEDURE — 99214 OFFICE O/P EST MOD 30 MIN: CPT | Performed by: FAMILY MEDICINE

## 2019-12-27 RX ORDER — AMOXICILLIN AND CLAVULANATE POTASSIUM 875; 125 MG/1; MG/1
1 TABLET, FILM COATED ORAL EVERY 12 HOURS SCHEDULED
Qty: 14 TABLET | Refills: 0 | Status: SHIPPED | OUTPATIENT
Start: 2019-12-27 | End: 2020-01-03

## 2019-12-27 NOTE — PROGRESS NOTES
Assessment/Plan:    No problem-specific Assessment & Plan notes found for this encounter  Diagnoses and all orders for this visit:    Acute non-recurrent maxillary sinusitis  Recommended for her to use a daily nasal spray such as flonase along with daily allergy medication with decongestant  -     amoxicillin-clavulanate (AUGMENTIN) 875-125 mg per tablet; Take 1 tablet by mouth every 12 (twelve) hours for 7 days      Follow up as needed    Subjective:      Patient ID: Rita Hernandez is a 64 y o  female  Sinus Pain  Patient complains of congestion, cough, nasal congestion, post nasal drip and sore throat  Onset of symptoms was a few days ago  Symptoms have been unchanged since that time  She is drinking plenty of fluids  Past history is significant for nothing  Patient is non-smoker  The following portions of the patient's history were reviewed and updated as appropriate:   She  has a past medical history of Seasonal allergies, Sinus congestion, Skin lesion of left lower extremity (2019), and Torn medial meniscus  She   Patient Active Problem List    Diagnosis Date Noted    Primary osteoarthritis of right knee 2019    Chronic pain of left knee 2019    Old tear of medial meniscus of right knee 2019    Osteoporosis without current pathological fracture 2019    Vaginal atrophy 04/15/2019    Stress incontinence of urine 04/15/2019    Acute maxillary sinusitis 2018    Iron deficiency 10/26/2016    Vitamin B12 deficiency 10/26/2016     She  has a past surgical history that includes Knee surgery; Replacement total knee; Knee arthroscopy, medial patello femoral ligament repair;  section; and Colonoscopy    Her family history includes Colon cancer (age of onset: [de-identified]) in her maternal grandfather; Hypertension in her mother; No Known Problems in her daughter, daughter, father, maternal grandmother, paternal aunt, and paternal grandmother; Pancreatic cancer (age of onset: 76) in her maternal aunt  She  reports that she has never smoked  She has never used smokeless tobacco  She reports that she drinks alcohol  She reports that she does not use drugs  Current Outpatient Medications   Medication Sig Dispense Refill    alendronate (FOSAMAX) 70 mg tablet Take 1 tablet (70 mg total) by mouth every 7 days 4 tablet 5    estradiol (ESTRACE) 0 1 mg/g vaginal cream Insert 1 g into the vagina 3 (three) times a week 42 5 g 3    amoxicillin-clavulanate (AUGMENTIN) 875-125 mg per tablet Take 1 tablet by mouth every 12 (twelve) hours for 7 days 14 tablet 0    ascorbic Acid (VITAMIN C) 500 MG CPCR Take 1 capsule (500 mg total) by mouth 2 (two) times a day (Patient not taking: Reported on 12/27/2019) 60 capsule 0    cholecalciferol (VITAMIN D3) 1,000 units tablet Take 1 tablet (1,000 Units total) by mouth daily (Patient not taking: Reported on 12/27/2019) 30 tablet 0    ferrous sulfate 324 (65 Fe) mg Take 1 tablet (324 mg total) by mouth daily before breakfast (Patient not taking: Reported on 12/27/2019) 30 tablet 0    folic acid (FOLVITE) 1 mg tablet Take 1 tablet (1 mg total) by mouth daily (Patient not taking: Reported on 12/27/2019) 30 tablet 0    zinc sulfate (ZINCATE) 220 mg capsule Take 1 capsule (220 mg total) by mouth daily (Patient not taking: Reported on 12/27/2019) 30 capsule 0     No current facility-administered medications for this visit        Current Outpatient Medications on File Prior to Visit   Medication Sig    alendronate (FOSAMAX) 70 mg tablet Take 1 tablet (70 mg total) by mouth every 7 days    estradiol (ESTRACE) 0 1 mg/g vaginal cream Insert 1 g into the vagina 3 (three) times a week    ascorbic Acid (VITAMIN C) 500 MG CPCR Take 1 capsule (500 mg total) by mouth 2 (two) times a day (Patient not taking: Reported on 12/27/2019)    cholecalciferol (VITAMIN D3) 1,000 units tablet Take 1 tablet (1,000 Units total) by mouth daily (Patient not taking: Reported on 12/27/2019)    ferrous sulfate 324 (65 Fe) mg Take 1 tablet (324 mg total) by mouth daily before breakfast (Patient not taking: Reported on 53/46/1108)    folic acid (FOLVITE) 1 mg tablet Take 1 tablet (1 mg total) by mouth daily (Patient not taking: Reported on 12/27/2019)    zinc sulfate (ZINCATE) 220 mg capsule Take 1 capsule (220 mg total) by mouth daily (Patient not taking: Reported on 12/27/2019)     No current facility-administered medications on file prior to visit  She has No Known Allergies       Review of Systems   Constitutional: Negative for activity change, appetite change, fatigue and fever  HENT: Positive for congestion, postnasal drip, rhinorrhea and sinus pain  Negative for ear discharge  Respiratory: Positive for cough  Negative for shortness of breath  Cardiovascular: Negative for chest pain and palpitations  Gastrointestinal: Negative for diarrhea and nausea  Musculoskeletal: Negative for arthralgias and back pain  Skin: Negative for color change and rash  Neurological: Negative for dizziness and headaches  Psychiatric/Behavioral: Negative for agitation and behavioral problems  Objective:      /86   Pulse 98   Temp 98 8 °F (37 1 °C)   Ht 4' 11" (1 499 m)   Wt 77 4 kg (170 lb 9 6 oz)   LMP 09/01/2010 (Approximate)   SpO2 100%   BMI 34 46 kg/m²          Physical Exam   Constitutional: She is oriented to person, place, and time  She appears well-developed and well-nourished  No distress  HENT:   Head: Normocephalic and atraumatic  Nose: Nose normal    Mouth/Throat: Oropharynx is clear and moist    Bilateral TM effusion   Eyes: Pupils are equal, round, and reactive to light  Conjunctivae are normal    Cardiovascular: Normal rate, regular rhythm and normal heart sounds  No murmur heard  Pulmonary/Chest: Effort normal and breath sounds normal  No respiratory distress  She has no wheezes  Abdominal: Soft   Bowel sounds are normal  She exhibits no distension  There is no tenderness  Neurological: She is alert and oriented to person, place, and time  Skin: Skin is warm and dry  No rash noted  She is not diaphoretic  No erythema  Psychiatric: She has a normal mood and affect

## 2019-12-30 LAB
ATRIAL RATE: 75 BPM
P AXIS: 44 DEGREES
PR INTERVAL: 152 MS
QRS AXIS: -7 DEGREES
QRSD INTERVAL: 88 MS
QT INTERVAL: 388 MS
QTC INTERVAL: 433 MS
T WAVE AXIS: 23 DEGREES
VENTRICULAR RATE: 75 BPM

## 2019-12-30 PROCEDURE — 93010 ELECTROCARDIOGRAM REPORT: CPT | Performed by: INTERNAL MEDICINE

## 2019-12-31 ENCOUNTER — CONSULT (OUTPATIENT)
Dept: CARDIOLOGY CLINIC | Facility: CLINIC | Age: 59
End: 2019-12-31
Payer: COMMERCIAL

## 2019-12-31 ENCOUNTER — OFFICE VISIT (OUTPATIENT)
Dept: FAMILY MEDICINE CLINIC | Facility: CLINIC | Age: 59
End: 2019-12-31
Payer: COMMERCIAL

## 2019-12-31 VITALS
HEART RATE: 88 BPM | BODY MASS INDEX: 33.59 KG/M2 | SYSTOLIC BLOOD PRESSURE: 128 MMHG | DIASTOLIC BLOOD PRESSURE: 78 MMHG | WEIGHT: 166.6 LBS | HEIGHT: 59 IN

## 2019-12-31 VITALS
SYSTOLIC BLOOD PRESSURE: 140 MMHG | HEIGHT: 59 IN | OXYGEN SATURATION: 100 % | DIASTOLIC BLOOD PRESSURE: 88 MMHG | HEART RATE: 101 BPM | TEMPERATURE: 98.7 F | BODY MASS INDEX: 33.71 KG/M2 | WEIGHT: 167.2 LBS

## 2019-12-31 DIAGNOSIS — R05.9 COUGH: Primary | ICD-10-CM

## 2019-12-31 DIAGNOSIS — Z01.810 PRE-OPERATIVE CARDIOVASCULAR EXAMINATION: Primary | ICD-10-CM

## 2019-12-31 DIAGNOSIS — G89.29 CHRONIC PAIN OF LEFT KNEE: ICD-10-CM

## 2019-12-31 DIAGNOSIS — J01.00 ACUTE NON-RECURRENT MAXILLARY SINUSITIS: ICD-10-CM

## 2019-12-31 DIAGNOSIS — R94.31 ABNORMAL EKG: ICD-10-CM

## 2019-12-31 DIAGNOSIS — M17.11 PRIMARY OSTEOARTHRITIS OF RIGHT KNEE: ICD-10-CM

## 2019-12-31 DIAGNOSIS — M25.562 CHRONIC PAIN OF LEFT KNEE: ICD-10-CM

## 2019-12-31 PROCEDURE — 99244 OFF/OP CNSLTJ NEW/EST MOD 40: CPT | Performed by: PHYSICIAN ASSISTANT

## 2019-12-31 PROCEDURE — 99213 OFFICE O/P EST LOW 20 MIN: CPT | Performed by: FAMILY MEDICINE

## 2019-12-31 RX ORDER — ASCORBIC ACID 500 MG
500 TABLET ORAL DAILY
COMMUNITY
End: 2020-01-22 | Stop reason: HOSPADM

## 2019-12-31 RX ORDER — BENZONATATE 100 MG/1
100 CAPSULE ORAL 3 TIMES DAILY PRN
Qty: 20 CAPSULE | Refills: 0 | Status: SHIPPED | OUTPATIENT
Start: 2019-12-31 | End: 2020-01-31

## 2019-12-31 NOTE — ASSESSMENT & PLAN NOTE
NSR, possible old IWMI, NSST Changes   Will check a nuclear stress test to be sure there is no ischemia present before clearing for him surgery

## 2019-12-31 NOTE — PROGRESS NOTES
[facility]   Outpatient Note  Garret Barnes  1960  [id number]  [facility] [address]  [place] [id number]  [facility]    Garret Barnes is a 64 y o  female    Assessment and Plan:   1  Pre-operative cardiovascular examination  Assessment & Plan:  Given abnormality on EKG with no prior to compare, I am going to check a nuclear non-walking stress test to be sure there is no ischemia prior to clearing for surgery  If there is no ischemia she can be clear with low to intermediate risk for cardiac event  Orders:  -     NM myocardial perfusion spect (rx stress and/or rest); Future; Expected date: 12/31/2019    2  Abnormal EKG  Assessment & Plan:  NSR, possible old IWMI, NSST Changes   Will check a nuclear stress test to be sure there is no ischemia present before clearing for him surgery  Orders:  -     NM myocardial perfusion spect (rx stress and/or rest); Future; Expected date: 12/31/2019    3  Primary osteoarthritis of right knee  Assessment & Plan:  Planned to TKR after a minimally invasive meniscal repair Scheduled for January 20, 2020  Orders:  -     Ambulatory referral to Cardiology  -     NM myocardial perfusion spect (rx stress and/or rest); Future; Expected date: 12/31/2019    4  Chronic pain of left knee  -     Ambulatory referral to Cardiology       Additional Plan: Thank you for the consult  Surveillance testing as outlined  If the stress test is non-ischemic she can move forward with surgery as planned  Return visit will on an as needed basis if there are problems in the future  The patient is encouraged to call in the meantime if there are questions or concerns  Subjective: The patient is referred for consultation regarding a Planned to TKR after a minimally invasive meniscal repair Scheduled for January 20, 2020   She has been suffering with knee pain for three years know since being rammed by her dog in the knee while greeting her   She has no cardiac risk factors of HTN, HLD or DMII  She does not smoke or consume alcohol  She has no family history of CAD  Her preoperative EKG however shows NSR, possible old IWMI and NS ST changes, she has had no old EKG to compare  From a cardiac perspective, she is doing well without complaints of chest pain or exertional dyspnea  She has no palpitations syncope or near syncope, and denies edema orthopnea or PND  She does not complain of TIA or CVA symptoms  Social History  Social History     Tobacco Use   Smoking Status Never Smoker   Smokeless Tobacco Never Used   ,   Social History     Substance and Sexual Activity   Alcohol Use Yes    Frequency: Monthly or less    Drinks per session: 1 or 2    Comment: social drinker in all scripts   ,   Social History     Substance and Sexual Activity   Drug Use No     Family History   Problem Relation Age of Onset    Hypertension Mother     No Known Problems Father     Colon cancer Maternal Grandfather [de-identified]    No Known Problems Daughter     No Known Problems Maternal Grandmother     No Known Problems Paternal Grandmother     Pancreatic cancer Maternal Aunt 76    No Known Problems Daughter     No Known Problems Paternal Aunt     Breast cancer Neg Hx        Medical and Surgical History  Past Medical History:   Diagnosis Date    Seasonal allergies     Sinus congestion     developed nasal congestion and cough 19- is going to call PCP today for RX; advised to call surgeon if not better next week      Skin lesion of left lower extremity 2019    Torn medial meniscus     2016  last assessed     Past Surgical History:   Procedure Laterality Date     SECTION      x2    COLONOSCOPY      KNEE ARTHROSCOPY, MEDIAL PATELLO FEMORAL LIGAMENT REPAIR      patellar closed    KNEE SURGERY      with medial meniscus repair    REPLACEMENT TOTAL KNEE           Current Outpatient Medications:     alendronate (FOSAMAX) 70 mg tablet, Take 1 tablet (70 mg total) by mouth every 7 days, Disp: 4 tablet, Rfl: 5    amoxicillin-clavulanate (AUGMENTIN) 875-125 mg per tablet, Take 1 tablet by mouth every 12 (twelve) hours for 7 days, Disp: 14 tablet, Rfl: 0    ascorbic acid (VITAMIN C) 500 mg tablet, Take 500 mg by mouth daily, Disp: , Rfl:     cholecalciferol (VITAMIN D3) 1,000 units tablet, Take 1 tablet (1,000 Units total) by mouth daily, Disp: 30 tablet, Rfl: 0    estradiol (ESTRACE) 0 1 mg/g vaginal cream, Insert 1 g into the vagina 3 (three) times a week, Disp: 42 5 g, Rfl: 3    ferrous sulfate 324 (65 Fe) mg, Take 1 tablet (324 mg total) by mouth daily before breakfast, Disp: 30 tablet, Rfl: 0    FOLIC ACID PO, Take by mouth daily, Disp: , Rfl:     zinc sulfate (ZINCATE) 220 mg capsule, Take 1 capsule (220 mg total) by mouth daily, Disp: 30 capsule, Rfl: 0    ascorbic Acid (VITAMIN C) 500 MG CPCR, Take 1 capsule (500 mg total) by mouth 2 (two) times a day, Disp: 60 capsule, Rfl: 0    benzonatate (TESSALON PERLES) 100 mg capsule, Take 1 capsule (100 mg total) by mouth 3 (three) times a day as needed for cough, Disp: 20 capsule, Rfl: 0    folic acid (FOLVITE) 1 mg tablet, Take 1 tablet (1 mg total) by mouth daily, Disp: 30 tablet, Rfl: 0  No Known Allergies    Review of Systems   Constitution: Negative  HENT: Negative  Eyes: Negative  Cardiovascular: Negative  Negative for chest pain, claudication, cyanosis, dyspnea on exertion, irregular heartbeat, leg swelling, near-syncope, orthopnea, palpitations, paroxysmal nocturnal dyspnea and syncope  Respiratory: Negative  Negative for cough, hemoptysis, shortness of breath, sleep disturbances due to breathing, snoring, sputum production and wheezing  Endocrine: Negative  Hematologic/Lymphatic: Negative  Skin: Negative  Musculoskeletal: Positive for joint pain (L-knee)  Gastrointestinal: Negative  Genitourinary: Negative  Neurological: Negative  Psychiatric/Behavioral: Negative  Allergic/Immunologic: Negative  Objective:   /78   Pulse 88   Ht 4' 11" (1 499 m)   Wt 75 6 kg (166 lb 9 6 oz)   LMP 09/01/2010 (Approximate)   BMI 33 65 kg/m²   Physical Exam   Constitutional: She is oriented to person, place, and time  She appears well-developed and well-nourished  HENT:   Head: Normocephalic and atraumatic  Mouth/Throat: Oropharynx is clear and moist    Eyes: Conjunctivae and EOM are normal  No scleral icterus  Neck: Normal range of motion  Neck supple  No JVD present  No tracheal deviation present  Cardiovascular: Normal rate, regular rhythm, normal heart sounds and intact distal pulses  Exam reveals no gallop and no friction rub  No murmur heard  Pulmonary/Chest: Effort normal and breath sounds normal  No respiratory distress  She has no wheezes  She has no rales  She exhibits no tenderness  Abdominal: Soft  Bowel sounds are normal  She exhibits no distension  There is no tenderness  Aorta not palpable    Musculoskeletal: Normal range of motion  She exhibits no edema or tenderness  LROM-L-knee   Neurological: She is alert and oriented to person, place, and time  Skin: Skin is warm and dry  No rash noted  No erythema  No pallor  Psychiatric: She has a normal mood and affect  Her behavior is normal    Nursing note and vitals reviewed        Lab Review:   No results found for: CHOL  Lab Results   Component Value Date    HDL 72 (H) 08/23/2018     Lab Results   Component Value Date    LDLCALC 94 08/23/2018     Lab Results   Component Value Date    TRIG 81 08/23/2018     Results Reviewed     None        Results Reviewed     None        Results Reviewed     None          Recent Cardiovascular Testing:   Nuclear stress test is ordered       ECG Review:   12-26-19: Normal sinus rhythm  Possible Inferior infarct , age undetermined  Abnormal ECG

## 2019-12-31 NOTE — PROGRESS NOTES
Jennifer Loya 1960 female MRN: [id number]      ASSESSMENT/PLAN  Problem List Items Addressed This Visit        Respiratory    Acute maxillary sinusitis       Other    Cough - Primary    Relevant Medications    benzonatate (TESSALON PERLES) 100 mg capsule        No evidence of PNA on exam  Encouraged to complete antibiotic course  Can supplement with nasal saline rinses  Will send Tessalon for symptom relief  Discussed precautions including fever, SOB  Future Appointments   Date Time Provider Joann Smith   1/2/2020  9:00 AM MD CLARISSA Zavaleta  [facility]   1/6/2020 10:00 AM Josette Fallon, PT MO PT KR MO [facility]   1/14/2020  9:00 AM [facility] NM 1 [facility] NM [address]   1/14/2020 10:30 AM [facility] STRESS 1 GH Car NI [address]   1/14/2020 11:00 AM [place] [facility] [place] [address]   1/23/2020 10:00 AM Josette Fallon PT MO PT KR MO [facility]   2/6/2020  3:45 PM Charmayne Must, DO [facility]   5/6/2020  8:30 AM MO MAMMO RBC 1 MO RBC Mammo MO RBC          SUBJECTIVE  CC: Sick (patient states she feels worse  was on antibiotics feels worse)      HPI:  Jennifer Loya is a 64 y o  female who presents for an acute visit due continued URI symptoms  12/27: "few" day history of URI symptoms -- was given Augmentin BID x7 days     Continues to have:   Nasal congestion, ache in ears, cough (productive), no SOB  TMax 100   Daughter has PNA + flu; Grandson at  with RSV outbreak   Also taking Allegra-D (but is going to stop this due to high blood pressure), Flonase, Advil cold & sinus     Review of Systems   Constitutional: Negative for fever  HENT: Positive for congestion, ear pain, postnasal drip and rhinorrhea  Negative for sore throat  Respiratory: Positive for cough  Negative for shortness of breath  Gastrointestinal: Negative for diarrhea and vomiting         Historical Information   The patient history was reviewed and updated as follows:    Past Medical History:   Diagnosis Date    Seasonal allergies     Sinus congestion     developed nasal congestion and cough 19- is going to call PCP today for RX; advised to call surgeon if not better next week      Skin lesion of left lower extremity 2019    Torn medial meniscus     2016  last assessed     Past Surgical History:   Procedure Laterality Date     SECTION      x2    COLONOSCOPY      KNEE ARTHROSCOPY, MEDIAL PATELLO FEMORAL LIGAMENT REPAIR      patellar closed    KNEE SURGERY      with medial meniscus repair    REPLACEMENT TOTAL KNEE       Family History   Problem Relation Age of Onset    Hypertension Mother     No Known Problems Father     Colon cancer Maternal Grandfather [de-identified]    No Known Problems Daughter     No Known Problems Maternal Grandmother     No Known Problems Paternal Grandmother     Pancreatic cancer Maternal Aunt 76    No Known Problems Daughter     No Known Problems Paternal Aunt     Breast cancer Neg Hx       Social History   Social History     Substance and Sexual Activity   Alcohol Use Yes    Frequency: Monthly or less    Drinks per session: 1 or 2    Comment: social drinker in all scripts     Social History     Substance and Sexual Activity   Drug Use No     Social History     Tobacco Use   Smoking Status Never Smoker   Smokeless Tobacco Never Used       Medications:     Current Outpatient Medications:     alendronate (FOSAMAX) 70 mg tablet, Take 1 tablet (70 mg total) by mouth every 7 days, Disp: 4 tablet, Rfl: 5    amoxicillin-clavulanate (AUGMENTIN) 875-125 mg per tablet, Take 1 tablet by mouth every 12 (twelve) hours for 7 days, Disp: 14 tablet, Rfl: 0    ascorbic Acid (VITAMIN C) 500 MG CPCR, Take 1 capsule (500 mg total) by mouth 2 (two) times a day, Disp: 60 capsule, Rfl: 0    ascorbic acid (VITAMIN C) 500 mg tablet, Take 500 mg by mouth daily, Disp: , Rfl:     cholecalciferol (VITAMIN D3) 1,000 units tablet, Take 1 tablet (1,000 Units total) by mouth daily, Disp: 30 tablet, Rfl: 0    estradiol (ESTRACE) 0 1 mg/g vaginal cream, Insert 1 g into the vagina 3 (three) times a week, Disp: 42 5 g, Rfl: 3    ferrous sulfate 324 (65 Fe) mg, Take 1 tablet (324 mg total) by mouth daily before breakfast, Disp: 30 tablet, Rfl: 0    folic acid (FOLVITE) 1 mg tablet, Take 1 tablet (1 mg total) by mouth daily, Disp: 30 tablet, Rfl: 0    FOLIC ACID PO, Take by mouth daily, Disp: , Rfl:     zinc sulfate (ZINCATE) 220 mg capsule, Take 1 capsule (220 mg total) by mouth daily, Disp: 30 capsule, Rfl: 0    benzonatate (TESSALON PERLES) 100 mg capsule, Take 1 capsule (100 mg total) by mouth 3 (three) times a day as needed for cough, Disp: 20 capsule, Rfl: 0  No Known Allergies    OBJECTIVE    Vitals:   Vitals:    12/31/19 1140   BP: 140/88   Pulse: 101   Temp: 98 7 °F (37 1 °C)   SpO2: 100%   Weight: 75 8 kg (167 lb 3 2 oz)   Height: 4' 11" (1 499 m)           Physical Exam   Constitutional: She appears well-developed and well-nourished  No distress  HENT:   Head: Normocephalic and atraumatic  Right Ear: External ear normal  Tympanic membrane is not erythematous, not retracted and not bulging  Left Ear: External ear normal  Tympanic membrane is not erythematous, not retracted and not bulging  Nose: Rhinorrhea present  Mouth/Throat: Oropharynx is clear and moist  No oropharyngeal exudate, posterior oropharyngeal edema or posterior oropharyngeal erythema  Eyes: Conjunctivae are normal    Neck: No thyromegaly present  Cardiovascular: Normal rate and regular rhythm  Pulmonary/Chest: Effort normal and breath sounds normal  No respiratory distress  She has no decreased breath sounds  She has no wheezes  She has no rales  Abdominal: Soft  Bowel sounds are normal  She exhibits no distension  There is no tenderness  Musculoskeletal: She exhibits no edema  Lymphadenopathy:     She has no cervical adenopathy  Neurological: She is alert  Skin: Skin is warm and dry     Psychiatric: She has a normal mood and affect  Vitals reviewed                   DO Janet Cordero [facility]   12/31/2019  12:02 PM

## 2019-12-31 NOTE — ASSESSMENT & PLAN NOTE
Given abnormality on EKG with no prior to compare, I am going to check a nuclear non-walking stress test to be sure there is no ischemia prior to clearing for surgery  If there is no ischemia she can be clear with low to intermediate risk for cardiac event

## 2020-01-02 ENCOUNTER — OFFICE VISIT (OUTPATIENT)
Dept: FAMILY MEDICINE CLINIC | Facility: CLINIC | Age: 60
End: 2020-01-02
Payer: COMMERCIAL

## 2020-01-02 VITALS
OXYGEN SATURATION: 98 % | SYSTOLIC BLOOD PRESSURE: 134 MMHG | TEMPERATURE: 98.1 F | DIASTOLIC BLOOD PRESSURE: 92 MMHG | WEIGHT: 167 LBS | BODY MASS INDEX: 33.67 KG/M2 | HEIGHT: 59 IN | HEART RATE: 100 BPM

## 2020-01-02 DIAGNOSIS — Z01.818 PRE-OP EXAMINATION: ICD-10-CM

## 2020-01-02 DIAGNOSIS — M17.11 PRIMARY OSTEOARTHRITIS OF RIGHT KNEE: Primary | ICD-10-CM

## 2020-01-02 PROBLEM — J01.00 ACUTE MAXILLARY SINUSITIS: Status: RESOLVED | Noted: 2018-08-23 | Resolved: 2020-01-02

## 2020-01-02 PROBLEM — R05.9 COUGH: Status: RESOLVED | Noted: 2018-06-15 | Resolved: 2020-01-02

## 2020-01-02 PROCEDURE — 99214 OFFICE O/P EST MOD 30 MIN: CPT | Performed by: FAMILY MEDICINE

## 2020-01-02 NOTE — PROGRESS NOTES
Carola Martinez 1960 female MRN: [id number]        ASSESSMENT/PLAN  Problem List Items Addressed This Visit        Musculoskeletal and Integument    Primary osteoarthritis of right knee - Primary       Other    Pre-op examination        Patient is medically stable to proceed with planned surgery  High Risk Surgery: no  CAD: no  CHF: no  CVD: no  DM2 on insulin: no  Cr>2: no        Carola Martinez is undergoing a Low Risk surgery  She is at [address] for major adverse cardiac event (MACE)  She may proceed with surgery as planned with further workup (has cardiac testing scheduled)  SUBJECTIVE  CC: Pre-op Exam      HPI:  Carola Martinez is a 64 y o  female with arthritis who is planning to undergo R knee arthroplasty under general by Dr Yoselin Daniels on 1/20/2020  Patient has not had complications with anesthesia in the past   Functional status: she can walk 2 blocks without getting chest pain or shortness of breath  She saw cardiology for pre-op clearance  Will be undergoing stress testing because her EKG was abnormal  Her BP is a little elevated today  She says she was taking Allegra D and Advil cold and sinus  Labs reviewed - were normal     She is currently on Augmentin for sinus infection  No fevers and is improving  Review of Systems   Constitutional: Negative for activity change, appetite change, chills, fatigue, fever and unexpected weight change  HENT: Positive for congestion  Negative for ear discharge, ear pain, postnasal drip, sinus pressure and sore throat  Eyes: Negative for discharge and visual disturbance  Respiratory: Negative for cough, shortness of breath and wheezing  Cardiovascular: Negative for chest pain, palpitations and leg swelling  Gastrointestinal: Negative for abdominal pain, constipation, diarrhea, nausea and vomiting  Endocrine: Negative for cold intolerance, heat intolerance, polydipsia and polyuria     Genitourinary: Negative for difficulty urinating and frequency  Musculoskeletal: Positive for arthralgias  Negative for back pain, joint swelling and myalgias  Skin: Negative for rash  Neurological: Negative for dizziness, weakness, light-headedness, numbness and headaches  Hematological: Negative for adenopathy  Psychiatric/Behavioral: Negative for behavioral problems, confusion, dysphoric mood, sleep disturbance and suicidal ideas  The patient is not nervous/anxious  Historical Information   The patient history was reviewed as follows:    Past Medical History:   Diagnosis Date    Seasonal allergies     Sinus congestion     developed nasal congestion and cough 19- is going to call PCP today for RX; advised to call surgeon if not better next week      Skin lesion of left lower extremity 2019    Torn medial meniscus     2016  last assessed     Past Surgical History:   Procedure Laterality Date     SECTION      x2    COLONOSCOPY      KNEE ARTHROSCOPY, MEDIAL PATELLO FEMORAL LIGAMENT REPAIR      patellar closed    KNEE SURGERY      with medial meniscus repair    REPLACEMENT TOTAL KNEE       Family History   Problem Relation Age of Onset    Hypertension Mother     No Known Problems Father     Colon cancer Maternal Grandfather [de-identified]    No Known Problems Daughter     No Known Problems Maternal Grandmother     No Known Problems Paternal Grandmother     Pancreatic cancer Maternal Aunt 76    No Known Problems Daughter     No Known Problems Paternal Aunt     Breast cancer Neg Hx       Social History       Medications:     Current Outpatient Medications:     alendronate (FOSAMAX) 70 mg tablet, Take 1 tablet (70 mg total) by mouth every 7 days, Disp: 4 tablet, Rfl: 5    amoxicillin-clavulanate (AUGMENTIN) 875-125 mg per tablet, Take 1 tablet by mouth every 12 (twelve) hours for 7 days, Disp: 14 tablet, Rfl: 0    ascorbic acid (VITAMIN C) 500 mg tablet, Take 500 mg by mouth daily, Disp: , Rfl:     benzonatate (TESSALON PERLES) 100 mg capsule, Take 1 capsule (100 mg total) by mouth 3 (three) times a day as needed for cough, Disp: 20 capsule, Rfl: 0    cholecalciferol (VITAMIN D3) 1,000 units tablet, Take 1 tablet (1,000 Units total) by mouth daily, Disp: 30 tablet, Rfl: 0    estradiol (ESTRACE) 0 1 mg/g vaginal cream, Insert 1 g into the vagina 3 (three) times a week, Disp: 42 5 g, Rfl: 3    ferrous sulfate 324 (65 Fe) mg, Take 1 tablet (324 mg total) by mouth daily before breakfast, Disp: 30 tablet, Rfl: 0    folic acid (FOLVITE) 1 mg tablet, Take 1 tablet (1 mg total) by mouth daily, Disp: 30 tablet, Rfl: 0    FOLIC ACID PO, Take by mouth daily, Disp: , Rfl:     zinc sulfate (ZINCATE) 220 mg capsule, Take 1 capsule (220 mg total) by mouth daily, Disp: 30 capsule, Rfl: 0  No Known Allergies    OBJECTIVE    Vitals:   Vitals:    01/02/20 0906   BP: 134/92   Pulse: 100   Temp: 98 1 °F (36 7 °C)   SpO2: 98%   Weight: 75 8 kg (167 lb)   Height: 4' 11" (1 499 m)           Physical Exam   Constitutional: She is oriented to person, place, and time  She appears well-developed and well-nourished  No distress  HENT:   Head: Normocephalic and atraumatic  Mouth/Throat: Oropharynx is clear and moist    Eyes: Pupils are equal, round, and reactive to light  Conjunctivae are normal  Right eye exhibits no discharge  Left eye exhibits no discharge  No scleral icterus  Neck: Neck supple  Cardiovascular: Normal rate, regular rhythm and normal heart sounds  Exam reveals no gallop and no friction rub  No murmur heard  Pulmonary/Chest: Effort normal and breath sounds normal  No respiratory distress  She has no wheezes  She has no rales  She exhibits no tenderness  Musculoskeletal: Normal range of motion  She exhibits no edema  Right knee: Tenderness found  Medial joint line tenderness noted  Varus deformity R knee   Neurological: She is alert and oriented to person, place, and time  Skin: Skin is warm and dry  No rash noted  She is not diaphoretic  Psychiatric: She has a normal mood and affect  Her behavior is normal  Judgment and thought content normal    Nursing note and vitals reviewed               Emmie Lyons MD  [facility]   1/2/2020  9:33 AM

## 2020-01-06 ENCOUNTER — TELEPHONE (OUTPATIENT)
Dept: OBGYN CLINIC | Facility: HOSPITAL | Age: 60
End: 2020-01-06

## 2020-01-06 ENCOUNTER — EVALUATION (OUTPATIENT)
Dept: PHYSICAL THERAPY | Age: 60
End: 2020-01-06
Payer: COMMERCIAL

## 2020-01-06 DIAGNOSIS — G89.29 CHRONIC PAIN OF LEFT KNEE: ICD-10-CM

## 2020-01-06 DIAGNOSIS — M17.11 PRIMARY OSTEOARTHRITIS OF RIGHT KNEE: Primary | ICD-10-CM

## 2020-01-06 DIAGNOSIS — M25.562 CHRONIC PAIN OF LEFT KNEE: ICD-10-CM

## 2020-01-06 PROCEDURE — 97161 PT EVAL LOW COMPLEX 20 MIN: CPT | Performed by: PHYSICAL THERAPIST

## 2020-01-06 PROCEDURE — 97110 THERAPEUTIC EXERCISES: CPT | Performed by: PHYSICAL THERAPIST

## 2020-01-06 NOTE — PROGRESS NOTES
PT Evaluation     Today's date: 2020  Patient name: Leland Worrell  : 1960  MRN: [id number]  Referring provider: Alden Hanley DO  Dx:   Encounter Diagnosis     ICD-10-CM    1  Primary osteoarthritis of right knee M17 11    2  Chronic pain of left knee M25 562     G89 29        Start Time: 1000  Stop Time: 1050  Total time in clinic (min): 50 minutes    Assessment  Assessment details: Leland Worrell is a 64 y o  female who presents with pain, decreased strength and decreased ROM  Due to these impairments, Patient has difficulty performing a/iadls  Patient's clinical presentation is consistent with their referring diagnosis of DJD right knee, pain left knee  Patient instructed in pre-op instructions including gait training with AD, proper stair training, therapeutic exercises for pre and post op until first PT session post -op  Patient was having a good day today as nice weather outside/pt  Patient would benefit from skilled physical therapy to address their aforementioned impairments, improve their level of function and to improve their overall quality of life  Impairments: abnormal gait, abnormal or restricted ROM, activity intolerance, impaired balance, impaired physical strength, lacks appropriate home exercise program, pain with function and weight-bearing intolerance  Understanding of Dx/Px/POC: excellent  Goals  ST-3 weeks  1  Indep with HEP and/or fitness program pre -op  2  Safe and proper technique on level and stairs with AD    3  Increase ROM right knee > 128°  4  Increase strength to max potential right knee flexion and extension  LT-4 WEEKS  1  Patient to be independent with a/iadls  2  Increase functional activities for leisure and home activities to previous LOF  Plan  Plan details: Patient to do HEP and will resume her PT after surgery 20    Patient would benefit from: skilled physical therapy  Planned modality interventions: cryotherapy, electrical stimulation/Russian stimulation, thermotherapy: hydrocollator packs and unattended electrical stimulation  Planned therapy interventions: activity modification, body mechanics training, flexibility, functional ROM exercises, home exercise program, IADL retraining, joint mobilization, manual therapy, neuromuscular re-education, patient education, postural training, strengthening, stretching, therapeutic activities, therapeutic exercise, balance/weight bearing training and gait training  Frequency: 2-3x week  Duration in weeks: 12  Plan of Care beginning date: 1/6/2020  Plan of Care expiration date: 4/6/2020  Treatment plan discussed with: patient        Subjective Evaluation    History of Present Illness  Mechanism of injury: Patient has been having pain medial right knee pain on and off effected by weather or activity for several months  xrays showed DJD with narrowing and Patient is scheduled for TKR 1/20/19  Patient seen for pre-op visit today  Recurrent probem    Pain  Current pain rating: 3  At worst pain rating: 10  Location: right knee  Quality: sharp and discomfort  Relieving factors: change in position and rest  Aggravating factors: standing, walking and stair climbing  Progression: worsening    Social Support  Steps to enter house: no  Stairs in house: yes   5  Lives in: multiple-level home  Lives with: spouse and adult children    Employment status: not working    Diagnostic Tests  X-ray: abnormal  Treatments  Previous treatment: injection treatment  Patient Goals  Patient goals for therapy: decreased pain, increased motion and increased strength  Patient goal: HEP and pre-op instructions        Objective     Static Posture     Knee   Genu varus  Observations     Additional Observation Details  5° varus stress right knee  Palpation     Additional Palpation Details  Medial right knee tenderness  Tenderness     Right Knee   Tenderness in the medial joint line and medial retinaculum  Neurological Testing     Sensation     Knee     Right Knee   Intact: light touch and hot/cold discrimination     Active Range of Motion   Left Knee   Flexion: 138 degrees   Extension: 0 degrees     Right Knee   Flexion: 118 degrees   Extension: -4 degrees     Patellar Static Positioning   Right Knee: lateral tilt    Strength/Myotome Testing     Left Knee   Flexion: 4+  Extension: 4+    Right Knee   Flexion: 4  Extension: 4    Swelling     Left Knee Girth Measurement (cm)   Joint line: 43 cm    Right Knee Girth Measurement (cm)   Joint line: 40 2 cm    Ambulation   Weight-Bearing Status   Weight-Bearing Status (Right): weight-bearing as tolerated    Assistive device used: none    Ambulation: Stairs   Ascend stairs: independent  Pattern: reciprocal  Railings: one rail  Descend stairs: independent  Pattern: non-reciprocal  Railings: one rail    General Comments:    Lower quarter screen   Hips: unremarkable  Foot/ankle: unremarkable           Precautions: L partial knee replacement 10yrs ago, MMT right knee 2015, osteoporosis, LBP      Manual                                                                                   Exercise Diary  1/6            HEP 28'                                                                                                                                                                                                                                                                   *Patient instructed in HEP: ankle pumps, Quad sets, SLR, Hip adduction band, hip adduction squeezes, SLR x3, (supine and standing), marches, knee flexion, squats, heel raises, SAQ, LAQ  Patient given copy of HEP       Modalities

## 2020-01-07 NOTE — TELEPHONE ENCOUNTER
Preoperative Elective Admission Assessment       Living Situation:  Patient reports living at home with her , Mike Acharya, and daughter, Noah Patterson  Home Layout:  Patient reports living in a split level home with a step in bathtub  Patient reports that she is picking up a shower seat and has grab bars installed                    Steps:  0 to enter the home but 5 to the main level    First Floor Setup:  Patient has to get up 5 stairs to stay on main level of home    Post-op Caregiver:   and daughter    Post-op Transport:   and daughter    Outpatient Physical Therapy Site:[facility]      DME:  Patient reports having crutches and a cane at home but denies having a rolling walker or bedside commode    Patient's Current Level of Function:  Patient reports being independent with activities of daily living and current ambulation  Medication Management:  Patient reports self managing medications removing them daily from the bottle                    Preferred Pharmacy:  [facility]                    Blood Management Vitamins:  Patient reports taking zinc, D3, folic acid, and iron as prescribed by surgeon                    Post-op anticoagulant: To be determined by surgeon postoperatively    DC Plan:  Home with outpatient physical therapy                    Barriers to DC identified preoperatively:     BMI:  33 73    [employer]:  Enrolled on 01/06 via text                    RAPT:  11                    ACE/ARB Form:  GFR greater than 60                    HOOS/KOOS:  [phone]    Patient Education: Pt educated on post op pain, early mobilization (POD0), indication/use of incentive spirometer (10x/hr while awake), and indication for/use of foot/leg pumps  pt encouraged to call me with questions, concerns or issues

## 2020-01-14 ENCOUNTER — HOSPITAL ENCOUNTER (OUTPATIENT)
Dept: NUCLEAR MEDICINE | Facility: HOSPITAL | Age: 60
Discharge: HOME | End: 2020-01-14

## 2020-01-14 ENCOUNTER — HOSPITAL ENCOUNTER (OUTPATIENT)
Dept: NUCLEAR MEDICINE | Facility: HOSPITAL | Age: 60
Discharge: HOME | End: 2020-01-14
Payer: COMMERCIAL

## 2020-01-14 ENCOUNTER — HOSPITAL ENCOUNTER (OUTPATIENT)
Dept: NON INVASIVE DIAGNOSTICS | Facility: HOSPITAL | Age: 60
Discharge: HOME | End: 2020-01-14
Payer: COMMERCIAL

## 2020-01-14 DIAGNOSIS — R94.31 ABNORMAL EKG: ICD-10-CM

## 2020-01-14 DIAGNOSIS — M17.11 PRIMARY OSTEOARTHRITIS OF RIGHT KNEE: ICD-10-CM

## 2020-01-14 DIAGNOSIS — Z01.810 PRE-OPERATIVE CARDIOVASCULAR EXAMINATION: ICD-10-CM

## 2020-01-14 LAB
ARRHY DURING EX: NORMAL
CHEST PAIN STATEMENT: NORMAL
MAX DIASTOLIC BP: 80 MMHG
MAX HEART RATE: 105 BPM
MAX PREDICTED HEART RATE: 163 BPM
MAX. SYSTOLIC BP: 150 MMHG
PROTOCOL NAME: NORMAL
REASON FOR TERMINATION: NORMAL
TARGET HR FORMULA: NORMAL
TEST INDICATION: NORMAL
TIME IN EXERCISE PHASE: NORMAL

## 2020-01-14 PROCEDURE — 78452 HT MUSCLE IMAGE SPECT MULT: CPT | Performed by: INTERNAL MEDICINE

## 2020-01-14 PROCEDURE — 78452 HT MUSCLE IMAGE SPECT MULT: CPT

## 2020-01-14 PROCEDURE — A9502 TC99M TETROFOSMIN: HCPCS

## 2020-01-14 PROCEDURE — 93017 CV STRESS TEST TRACING ONLY: CPT

## 2020-01-14 PROCEDURE — 93016 CV STRESS TEST SUPVJ ONLY: CPT | Performed by: INTERNAL MEDICINE

## 2020-01-14 PROCEDURE — 93018 CV STRESS TEST I&R ONLY: CPT | Performed by: INTERNAL MEDICINE

## 2020-01-14 RX ADMIN — REGADENOSON 0.4 MG: 0.08 INJECTION, SOLUTION INTRAVENOUS at 10:50

## 2020-01-15 ENCOUNTER — DOCUMENTATION (OUTPATIENT)
Dept: CARDIOLOGY CLINIC | Facility: CLINIC | Age: 60
End: 2020-01-15

## 2020-01-15 NOTE — PROGRESS NOTES
The patient's most recent visit and cardiac testing (nuclear stress 1-14-20 and EKG 12-26-19 ) are reviewed  There is no significant new ischemia, change in left ventricular function, concerning wall motion abnormality, or new arrhythmia found  From cardiac perspective the patient is cleared to move forward with the procedure as planned with a low risk for cardiac event  Please see my most recent office note from 12-31-19 for additional details  Thank you

## 2020-01-16 ENCOUNTER — TELEPHONE (OUTPATIENT)
Dept: OBGYN CLINIC | Facility: CLINIC | Age: 60
End: 2020-01-16

## 2020-01-17 ENCOUNTER — ANESTHESIA EVENT (OUTPATIENT)
Dept: PERIOP | Facility: HOSPITAL | Age: 60
DRG: 470 | End: 2020-01-17
Payer: COMMERCIAL

## 2020-01-17 NOTE — ANESTHESIA PREPROCEDURE EVALUATION
Review of Systems/Medical History  Patient summary reviewed    History of anesthetic complications PONV    Cardiovascular  Exercise tolerance (METS): >4,    Comment: SUMMARY:  -  Stress results: There was no chest pain during stress  -  ECG conclusions: The stress ECG was negative for ischemia and normal   -  Perfusion imaging: There were no perfusion defects   -  Gated SPECT: The calculated left ventricular ejection fraction was 72 %  There was no left ventricular regional abnormality      IMPRESSIONS: Normal study  Myocardial perfusion imaging was normal at rest and with stress  Based on this study, the risk of a perioperative complication due to myocardial ischemia appears to be low  Results reviewd with patient ,  Pulmonary  Negative pulmonary ROS        GI/Hepatic  Negative GI/hepatic ROS          Negative  ROS        Endo/Other    Obesity    GYN       Hematology  Negative hematology ROS      Musculoskeletal    Arthritis     Neurology  Negative neurology ROS      Psychology   Negative psychology ROS              Physical Exam    Airway    Mallampati score: II  TM Distance: >3 FB  Neck ROM: full     Dental   No notable dental hx     Cardiovascular  Rhythm: regular, Rate: normal,     Pulmonary  Breath sounds clear to auscultation,     Other Findings        Anesthesia Plan  ASA Score- 2     Anesthesia Type- IV sedation with anesthesia and regional with ASA Monitors  Additional Monitors:   Airway Plan:         Plan Factors-  Patient did not smoke on day of surgery  Induction- intravenous  Postoperative Plan-     Informed Consent- Anesthetic plan and risks discussed with patient  I personally reviewed this patient with the CRNA  Discussed and agreed on the Anesthesia Plan with the CRNA  Aaliyah Reagan

## 2020-01-20 ENCOUNTER — ANESTHESIA (OUTPATIENT)
Dept: PERIOP | Facility: HOSPITAL | Age: 60
DRG: 470 | End: 2020-01-20
Payer: COMMERCIAL

## 2020-01-20 ENCOUNTER — HOSPITAL ENCOUNTER (INPATIENT)
Facility: HOSPITAL | Age: 60
LOS: 1 days | Discharge: HOME | DRG: 470 | End: 2020-01-22
Attending: ORTHOPAEDIC SURGERY | Admitting: ORTHOPAEDIC SURGERY
Payer: COMMERCIAL

## 2020-01-20 ENCOUNTER — APPOINTMENT (OUTPATIENT)
Dept: RADIOLOGY | Facility: HOSPITAL | Age: 60
DRG: 470 | End: 2020-01-20
Payer: COMMERCIAL

## 2020-01-20 DIAGNOSIS — Z96.651 S/P TKR (TOTAL KNEE REPLACEMENT) USING CEMENT, RIGHT: Primary | ICD-10-CM

## 2020-01-20 PROBLEM — M25.561 CHRONIC PAIN OF RIGHT KNEE: Status: ACTIVE | Noted: 2019-11-26

## 2020-01-20 LAB
ABO GROUP BLD: NORMAL
RH BLD: POSITIVE

## 2020-01-20 PROCEDURE — C1713 ANCHOR/SCREW BN/BN,TIS/BN: HCPCS | Performed by: ORTHOPAEDIC SURGERY

## 2020-01-20 PROCEDURE — 27447 TOTAL KNEE ARTHROPLASTY: CPT | Performed by: PHYSICIAN ASSISTANT

## 2020-01-20 PROCEDURE — G0379 DIRECT REFER HOSPITAL OBSERV: HCPCS

## 2020-01-20 PROCEDURE — 99024 POSTOP FOLLOW-UP VISIT: CPT | Performed by: ORTHOPAEDIC SURGERY

## 2020-01-20 PROCEDURE — 0SRC0J9 REPLACEMENT OF RIGHT KNEE JOINT WITH SYNTHETIC SUBSTITUTE, CEMENTED, OPEN APPROACH: ICD-10-PCS | Performed by: ORTHOPAEDIC SURGERY

## 2020-01-20 PROCEDURE — C1776 JOINT DEVICE (IMPLANTABLE): HCPCS | Performed by: ORTHOPAEDIC SURGERY

## 2020-01-20 PROCEDURE — 97116 GAIT TRAINING THERAPY: CPT

## 2020-01-20 PROCEDURE — 27447 TOTAL KNEE ARTHROPLASTY: CPT | Performed by: ORTHOPAEDIC SURGERY

## 2020-01-20 PROCEDURE — 73560 X-RAY EXAM OF KNEE 1 OR 2: CPT

## 2020-01-20 PROCEDURE — 97162 PT EVAL MOD COMPLEX 30 MIN: CPT

## 2020-01-20 DEVICE — ATTUNE KNEE SYSTEM TIBIAL INSERT FIXED BEARING CRUCIATE RETAINING 4 8MM AOX
Type: IMPLANTABLE DEVICE | Site: KNEE | Status: FUNCTIONAL
Brand: ATTUNE

## 2020-01-20 DEVICE — ATTUNE KNEE SYSTEM FEMORAL CRUCIATE RETAINING NARROW SIZE 4N RIGHT CEMENTED
Type: IMPLANTABLE DEVICE | Site: KNEE | Status: FUNCTIONAL
Brand: ATTUNE

## 2020-01-20 DEVICE — ATTUNE PATELLA MEDIALIZED DOME 32MM CEMENTED AOX
Type: IMPLANTABLE DEVICE | Site: KNEE | Status: FUNCTIONAL
Brand: ATTUNE

## 2020-01-20 DEVICE — ATTUNE KNEE SYSTEM TIBIAL BASE FIXED BEARING SIZE 3 CEMENTED
Type: IMPLANTABLE DEVICE | Site: KNEE | Status: FUNCTIONAL
Brand: ATTUNE

## 2020-01-20 DEVICE — PALACOS® R IS A FAST-CURING, RADIOPAQUE, POLY(METHYL METHACRYLATE)-BASED BONE CEMENT.PALACOS ® R CONTAINS THE X-RAY CONTRAST MEDIUM ZIRCONIUM DIOXIDE. TO IMPROVE VISIBILITY IN THE SURGICAL FIELD PALACOS ® R HAS BEEN COLOURED WITH CHLOROPHYLL (E141). THE BONE CEMENT IS PREPARED DIRECTLY BEFORE USE BY MIXING A POLYMER POWDER COMPONENT WITH A LIQUID MONOMER COMPONENT. A DUCTILE DOUGH FORMS WHICH CURES WITHIN A FEW MINUTES.
Type: IMPLANTABLE DEVICE | Site: KNEE | Status: FUNCTIONAL
Brand: PALACOS®

## 2020-01-20 RX ORDER — ACETAMINOPHEN 325 MG/1
975 TABLET ORAL EVERY 8 HOURS
Status: DISCONTINUED | OUTPATIENT
Start: 2020-01-20 | End: 2020-01-22 | Stop reason: HOSPADM

## 2020-01-20 RX ORDER — FLUTICASONE PROPIONATE 50 MCG
1 SPRAY, SUSPENSION (ML) NASAL 2 TIMES DAILY
COMMUNITY
End: 2021-06-09 | Stop reason: ALTCHOICE

## 2020-01-20 RX ORDER — ACETAMINOPHEN 325 MG/1
975 TABLET ORAL ONCE
Status: COMPLETED | OUTPATIENT
Start: 2020-01-20 | End: 2020-01-20

## 2020-01-20 RX ORDER — MAGNESIUM HYDROXIDE/ALUMINUM HYDROXICE/SIMETHICONE 120; 1200; 1200 MG/30ML; MG/30ML; MG/30ML
30 SUSPENSION ORAL EVERY 6 HOURS PRN
Status: DISCONTINUED | OUTPATIENT
Start: 2020-01-20 | End: 2020-01-22 | Stop reason: HOSPADM

## 2020-01-20 RX ORDER — MAGNESIUM HYDROXIDE 1200 MG/15ML
LIQUID ORAL AS NEEDED
Status: DISCONTINUED | OUTPATIENT
Start: 2020-01-20 | End: 2020-01-20 | Stop reason: HOSPADM

## 2020-01-20 RX ORDER — OXYCODONE HYDROCHLORIDE 5 MG/1
5 TABLET ORAL EVERY 4 HOURS PRN
Status: DISCONTINUED | OUTPATIENT
Start: 2020-01-20 | End: 2020-01-22 | Stop reason: HOSPADM

## 2020-01-20 RX ORDER — BUPIVACAINE HYDROCHLORIDE 2.5 MG/ML
INJECTION, SOLUTION EPIDURAL; INFILTRATION; INTRACAUDAL AS NEEDED
Status: DISCONTINUED | OUTPATIENT
Start: 2020-01-20 | End: 2020-01-20 | Stop reason: HOSPADM

## 2020-01-20 RX ORDER — GABAPENTIN 100 MG/1
200 CAPSULE ORAL 2 TIMES DAILY
Status: DISCONTINUED | OUTPATIENT
Start: 2020-01-20 | End: 2020-01-22 | Stop reason: HOSPADM

## 2020-01-20 RX ORDER — METOCLOPRAMIDE HYDROCHLORIDE 5 MG/ML
10 INJECTION INTRAMUSCULAR; INTRAVENOUS ONCE AS NEEDED
Status: DISCONTINUED | OUTPATIENT
Start: 2020-01-20 | End: 2020-01-20 | Stop reason: HOSPADM

## 2020-01-20 RX ORDER — OXYCODONE HCL 10 MG/1
10 TABLET, FILM COATED, EXTENDED RELEASE ORAL ONCE
Status: COMPLETED | OUTPATIENT
Start: 2020-01-20 | End: 2020-01-20

## 2020-01-20 RX ORDER — HYDROMORPHONE HCL/PF 1 MG/ML
0.2 SYRINGE (ML) INJECTION
Status: DISCONTINUED | OUTPATIENT
Start: 2020-01-20 | End: 2020-01-20 | Stop reason: HOSPADM

## 2020-01-20 RX ORDER — DEXAMETHASONE SODIUM PHOSPHATE 4 MG/ML
10 INJECTION, SOLUTION INTRA-ARTICULAR; INTRALESIONAL; INTRAMUSCULAR; INTRAVENOUS; SOFT TISSUE ONCE
Status: COMPLETED | OUTPATIENT
Start: 2020-01-21 | End: 2020-01-21

## 2020-01-20 RX ORDER — MIDAZOLAM HYDROCHLORIDE 2 MG/2ML
INJECTION, SOLUTION INTRAMUSCULAR; INTRAVENOUS AS NEEDED
Status: DISCONTINUED | OUTPATIENT
Start: 2020-01-20 | End: 2020-01-20 | Stop reason: SURG

## 2020-01-20 RX ORDER — PANTOPRAZOLE SODIUM 40 MG/1
40 TABLET, DELAYED RELEASE ORAL
Status: DISCONTINUED | OUTPATIENT
Start: 2020-01-20 | End: 2020-01-22 | Stop reason: HOSPADM

## 2020-01-20 RX ORDER — ONDANSETRON 2 MG/ML
INJECTION INTRAMUSCULAR; INTRAVENOUS AS NEEDED
Status: DISCONTINUED | OUTPATIENT
Start: 2020-01-20 | End: 2020-01-20 | Stop reason: SURG

## 2020-01-20 RX ORDER — LIDOCAINE HYDROCHLORIDE 10 MG/ML
INJECTION, SOLUTION EPIDURAL; INFILTRATION; INTRACAUDAL; PERINEURAL AS NEEDED
Status: DISCONTINUED | OUTPATIENT
Start: 2020-01-20 | End: 2020-01-20 | Stop reason: SURG

## 2020-01-20 RX ORDER — CHLORHEXIDINE GLUCONATE 0.12 MG/ML
15 RINSE ORAL ONCE
Status: COMPLETED | OUTPATIENT
Start: 2020-01-20 | End: 2020-01-20

## 2020-01-20 RX ORDER — BUPIVACAINE HYDROCHLORIDE 7.5 MG/ML
INJECTION, SOLUTION INTRASPINAL AS NEEDED
Status: DISCONTINUED | OUTPATIENT
Start: 2020-01-20 | End: 2020-01-20 | Stop reason: SURG

## 2020-01-20 RX ORDER — CHLORHEXIDINE GLUCONATE 0.12 MG/ML
15 RINSE ORAL ONCE
Status: DISCONTINUED | OUTPATIENT
Start: 2020-01-20 | End: 2020-01-20 | Stop reason: SDUPTHER

## 2020-01-20 RX ORDER — FLUTICASONE PROPIONATE 50 MCG
1 SPRAY, SUSPENSION (ML) NASAL 2 TIMES DAILY
Status: DISCONTINUED | OUTPATIENT
Start: 2020-01-20 | End: 2020-01-22 | Stop reason: HOSPADM

## 2020-01-20 RX ORDER — CELECOXIB 100 MG/1
100 CAPSULE ORAL 2 TIMES DAILY
Status: DISCONTINUED | OUTPATIENT
Start: 2020-01-20 | End: 2020-01-22 | Stop reason: HOSPADM

## 2020-01-20 RX ORDER — PROPOFOL 10 MG/ML
INJECTION, EMULSION INTRAVENOUS AS NEEDED
Status: DISCONTINUED | OUTPATIENT
Start: 2020-01-20 | End: 2020-01-20 | Stop reason: SURG

## 2020-01-20 RX ORDER — ROPIVACAINE HYDROCHLORIDE 2 MG/ML
INJECTION, SOLUTION EPIDURAL; INFILTRATION; PERINEURAL
Status: DISCONTINUED | OUTPATIENT
Start: 2020-01-20 | End: 2020-01-20 | Stop reason: SURG

## 2020-01-20 RX ORDER — ALBUMIN, HUMAN INJ 5% 5 %
SOLUTION INTRAVENOUS CONTINUOUS PRN
Status: DISCONTINUED | OUTPATIENT
Start: 2020-01-20 | End: 2020-01-20 | Stop reason: SURG

## 2020-01-20 RX ORDER — SODIUM CHLORIDE, SODIUM LACTATE, POTASSIUM CHLORIDE, CALCIUM CHLORIDE 600; 310; 30; 20 MG/100ML; MG/100ML; MG/100ML; MG/100ML
125 INJECTION, SOLUTION INTRAVENOUS CONTINUOUS
Status: DISCONTINUED | OUTPATIENT
Start: 2020-01-20 | End: 2020-01-20

## 2020-01-20 RX ORDER — HYDROMORPHONE HCL/PF 1 MG/ML
0.5 SYRINGE (ML) INJECTION EVERY 2 HOUR PRN
Status: DISPENSED | OUTPATIENT
Start: 2020-01-20 | End: 2020-01-22

## 2020-01-20 RX ORDER — OXYCODONE HYDROCHLORIDE 10 MG/1
10 TABLET ORAL EVERY 4 HOURS PRN
Status: DISCONTINUED | OUTPATIENT
Start: 2020-01-20 | End: 2020-01-22 | Stop reason: HOSPADM

## 2020-01-20 RX ORDER — DEXAMETHASONE SODIUM PHOSPHATE 4 MG/ML
INJECTION, SOLUTION INTRA-ARTICULAR; INTRALESIONAL; INTRAMUSCULAR; INTRAVENOUS; SOFT TISSUE AS NEEDED
Status: DISCONTINUED | OUTPATIENT
Start: 2020-01-20 | End: 2020-01-20 | Stop reason: SURG

## 2020-01-20 RX ORDER — CEFAZOLIN SODIUM 2 G/50ML
2000 SOLUTION INTRAVENOUS ONCE
Status: COMPLETED | OUTPATIENT
Start: 2020-01-20 | End: 2020-01-20

## 2020-01-20 RX ORDER — SODIUM CHLORIDE 9 MG/ML
INJECTION, SOLUTION INTRAVENOUS AS NEEDED
Status: DISCONTINUED | OUTPATIENT
Start: 2020-01-20 | End: 2020-01-20 | Stop reason: HOSPADM

## 2020-01-20 RX ORDER — ASPIRIN 325 MG
325 TABLET ORAL 2 TIMES DAILY
Status: DISCONTINUED | OUTPATIENT
Start: 2020-01-20 | End: 2020-01-22 | Stop reason: HOSPADM

## 2020-01-20 RX ORDER — ONDANSETRON 2 MG/ML
4 INJECTION INTRAMUSCULAR; INTRAVENOUS EVERY 6 HOURS PRN
Status: DISCONTINUED | OUTPATIENT
Start: 2020-01-20 | End: 2020-01-22 | Stop reason: HOSPADM

## 2020-01-20 RX ORDER — GABAPENTIN 300 MG/1
300 CAPSULE ORAL ONCE
Status: COMPLETED | OUTPATIENT
Start: 2020-01-20 | End: 2020-01-20

## 2020-01-20 RX ORDER — PROPOFOL 10 MG/ML
INJECTION, EMULSION INTRAVENOUS CONTINUOUS PRN
Status: DISCONTINUED | OUTPATIENT
Start: 2020-01-20 | End: 2020-01-20 | Stop reason: SURG

## 2020-01-20 RX ORDER — MEPERIDINE HYDROCHLORIDE 25 MG/ML
12.5 INJECTION INTRAMUSCULAR; INTRAVENOUS; SUBCUTANEOUS
Status: DISCONTINUED | OUTPATIENT
Start: 2020-01-20 | End: 2020-01-20 | Stop reason: HOSPADM

## 2020-01-20 RX ORDER — CEFAZOLIN SODIUM 2 G/50ML
2000 SOLUTION INTRAVENOUS EVERY 8 HOURS
Status: COMPLETED | OUTPATIENT
Start: 2020-01-20 | End: 2020-01-21

## 2020-01-20 RX ORDER — DOCUSATE SODIUM 100 MG/1
100 CAPSULE, LIQUID FILLED ORAL 2 TIMES DAILY
Status: DISCONTINUED | OUTPATIENT
Start: 2020-01-20 | End: 2020-01-22 | Stop reason: HOSPADM

## 2020-01-20 RX ORDER — SODIUM CHLORIDE 9 MG/ML
75 INJECTION, SOLUTION INTRAVENOUS CONTINUOUS
Status: DISCONTINUED | OUTPATIENT
Start: 2020-01-20 | End: 2020-01-21 | Stop reason: ALTCHOICE

## 2020-01-20 RX ORDER — FENTANYL CITRATE/PF 50 MCG/ML
25 SYRINGE (ML) INJECTION
Status: DISCONTINUED | OUTPATIENT
Start: 2020-01-20 | End: 2020-01-20 | Stop reason: HOSPADM

## 2020-01-20 RX ORDER — SCOLOPAMINE TRANSDERMAL SYSTEM 1 MG/1
1 PATCH, EXTENDED RELEASE TRANSDERMAL ONCE
Status: DISCONTINUED | OUTPATIENT
Start: 2020-01-20 | End: 2020-01-21 | Stop reason: ALTCHOICE

## 2020-01-20 RX ADMIN — ONDANSETRON 4 MG: 2 INJECTION INTRAMUSCULAR; INTRAVENOUS at 14:01

## 2020-01-20 RX ADMIN — PHENYLEPHRINE HYDROCHLORIDE 100 MCG: 10 INJECTION INTRAVENOUS at 10:31

## 2020-01-20 RX ADMIN — ACETAMINOPHEN 975 MG: 325 TABLET, FILM COATED ORAL at 21:41

## 2020-01-20 RX ADMIN — PHENYLEPHRINE HYDROCHLORIDE 100 MCG: 10 INJECTION INTRAVENOUS at 11:10

## 2020-01-20 RX ADMIN — PANTOPRAZOLE SODIUM 40 MG: 40 TABLET, DELAYED RELEASE ORAL at 14:02

## 2020-01-20 RX ADMIN — BUPIVACAINE HYDROCHLORIDE IN DEXTROSE 1.4 ML: 7.5 INJECTION, SOLUTION SUBARACHNOID at 10:08

## 2020-01-20 RX ADMIN — GABAPENTIN 200 MG: 100 CAPSULE ORAL at 17:15

## 2020-01-20 RX ADMIN — SODIUM CHLORIDE 75 ML/HR: 0.9 INJECTION, SOLUTION INTRAVENOUS at 14:14

## 2020-01-20 RX ADMIN — ALBUMIN (HUMAN): 12.5 SOLUTION INTRAVENOUS at 11:15

## 2020-01-20 RX ADMIN — PHENYLEPHRINE HYDROCHLORIDE 100 MCG: 10 INJECTION INTRAVENOUS at 11:38

## 2020-01-20 RX ADMIN — CEFAZOLIN SODIUM 2000 MG: 2 SOLUTION INTRAVENOUS at 10:03

## 2020-01-20 RX ADMIN — ACETAMINOPHEN 975 MG: 325 TABLET, FILM COATED ORAL at 14:02

## 2020-01-20 RX ADMIN — PROPOFOL 80 MCG/KG/MIN: 10 INJECTION, EMULSION INTRAVENOUS at 10:13

## 2020-01-20 RX ADMIN — PHENYLEPHRINE HYDROCHLORIDE 200 MCG: 10 INJECTION INTRAVENOUS at 11:27

## 2020-01-20 RX ADMIN — DEXAMETHASONE SODIUM PHOSPHATE 4 MG: 4 INJECTION, SOLUTION INTRA-ARTICULAR; INTRALESIONAL; INTRAMUSCULAR; INTRAVENOUS; SOFT TISSUE at 10:13

## 2020-01-20 RX ADMIN — SODIUM CHLORIDE, SODIUM LACTATE, POTASSIUM CHLORIDE, AND CALCIUM CHLORIDE 125 ML/HR: .6; .31; .03; .02 INJECTION, SOLUTION INTRAVENOUS at 09:12

## 2020-01-20 RX ADMIN — PROPOFOL 40 MG: 10 INJECTION, EMULSION INTRAVENOUS at 10:11

## 2020-01-20 RX ADMIN — OXYCODONE HYDROCHLORIDE 10 MG: 10 TABLET ORAL at 14:02

## 2020-01-20 RX ADMIN — DOCUSATE SODIUM 100 MG: 100 CAPSULE, LIQUID FILLED ORAL at 17:16

## 2020-01-20 RX ADMIN — PHENYLEPHRINE HYDROCHLORIDE 100 MCG: 10 INJECTION INTRAVENOUS at 10:51

## 2020-01-20 RX ADMIN — ONDANSETRON 4 MG: 2 INJECTION INTRAMUSCULAR; INTRAVENOUS at 10:13

## 2020-01-20 RX ADMIN — ROPIVACAINE HYDROCHLORIDE 20 ML: 2 INJECTION, SOLUTION EPIDURAL; INFILTRATION at 12:45

## 2020-01-20 RX ADMIN — MIDAZOLAM HYDROCHLORIDE 2 MG: 1 INJECTION, SOLUTION INTRAMUSCULAR; INTRAVENOUS at 10:04

## 2020-01-20 RX ADMIN — GABAPENTIN 300 MG: 300 CAPSULE ORAL at 08:48

## 2020-01-20 RX ADMIN — PHENYLEPHRINE HYDROCHLORIDE 100 MCG: 10 INJECTION INTRAVENOUS at 11:07

## 2020-01-20 RX ADMIN — CELECOXIB 100 MG: 100 CAPSULE ORAL at 17:15

## 2020-01-20 RX ADMIN — PHENYLEPHRINE HYDROCHLORIDE 200 MCG: 10 INJECTION INTRAVENOUS at 11:12

## 2020-01-20 RX ADMIN — CHLORHEXIDINE GLUCONATE 0.12% ORAL RINSE 15 ML: 1.2 LIQUID ORAL at 08:48

## 2020-01-20 RX ADMIN — SODIUM CHLORIDE, SODIUM LACTATE, POTASSIUM CHLORIDE, AND CALCIUM CHLORIDE: .6; .31; .03; .02 INJECTION, SOLUTION INTRAVENOUS at 12:05

## 2020-01-20 RX ADMIN — LIDOCAINE HYDROCHLORIDE 3 ML: 10 INJECTION, SOLUTION EPIDURAL; INFILTRATION; INTRACAUDAL; PERINEURAL at 10:07

## 2020-01-20 RX ADMIN — ACETAMINOPHEN 975 MG: 325 TABLET, FILM COATED ORAL at 08:47

## 2020-01-20 RX ADMIN — SCOPALAMINE 1 PATCH: 1 PATCH, EXTENDED RELEASE TRANSDERMAL at 08:48

## 2020-01-20 RX ADMIN — CEFAZOLIN SODIUM 2000 MG: 2 SOLUTION INTRAVENOUS at 17:15

## 2020-01-20 RX ADMIN — OXYCODONE HYDROCHLORIDE 10 MG: 10 TABLET, FILM COATED, EXTENDED RELEASE ORAL at 08:48

## 2020-01-20 RX ADMIN — TRANEXAMIC ACID 50 ML: 1 INJECTION, SOLUTION INTRAVENOUS at 10:35

## 2020-01-20 RX ADMIN — PHENYLEPHRINE HYDROCHLORIDE 100 MCG: 10 INJECTION INTRAVENOUS at 11:50

## 2020-01-20 NOTE — PLAN OF CARE
Problem: PAIN - ADULT  Goal: Verbalizes/displays adequate comfort level or baseline comfort level  Description  Interventions:  - Encourage patient to monitor pain and request assistance  - Assess pain using appropriate pain scale  - Administer analgesics based on type and severity of pain and evaluate response  - Implement non-pharmacological measures as appropriate and evaluate response  - Consider cultural and social influences on pain and pain management  - Notify physician/advanced practitioner if interventions unsuccessful or patient reports new pain  Outcome: Progressing   Monitor for pain/ administer medication PRN/ Non-pharmacological measures perform  Problem: SAFETY ADULT  Goal: Patient will remain free of falls  Description  INTERVENTIONS:  - Assess patient frequently for physical needs  -  Identify cognitive and physical deficits and behaviors that affect risk of falls    -  [place] fall precautions as indicated by assessment   - Educate patient/family on patient safety including physical limitations  - Instruct patient to call for assistance with activity based on assessment  - Modify environment to reduce risk of injury  - Consider OT/PT consult to assist with strengthening/mobility  Outcome: Progressing  Goal: Maintain or return to baseline ADL function  Description  INTERVENTIONS:  -  Assess patient's ability to carry out ADLs; assess patient's baseline for ADL function and identify physical deficits which impact ability to perform ADLs (bathing, care of mouth/teeth, toileting, grooming, dressing, etc )  - Assess/evaluate cause of self-care deficits   - Assess range of motion  - Assess patient's mobility; develop plan if impaired  - Assess patient's need for assistive devices and provide as appropriate  - Encourage maximum independence but intervene and supervise when necessary  - Involve family in performance of ADLs  - Assess for home care needs following discharge   - Consider OT consult to assist with ADL evaluation and planning for discharge  - Provide patient education as appropriate  Outcome: Progressing  Goal: Maintain or return mobility status to optimal level  Description  INTERVENTIONS:  - Assess patient's baseline mobility status (ambulation, transfers, stairs, etc )    - Identify cognitive and physical deficits and behaviors that affect mobility  - Identify mobility aids required to assist with transfers and/or ambulation (gait belt, sit-to-stand, lift, walker, cane, etc )  - [place] fall precautions as indicated by assessment  - Record patient progress and toleration of activity level on Mobility SBAR; progress patient to next Phase/Stage  - Instruct patient to call for assistance with activity based on assessment  - Consider rehabilitation consult to assist with strengthening/weightbearing, etc   Outcome: Progressing   Bed alarm/ Assist patient and use walker/ call bell  Problem: DISCHARGE PLANNING  Goal: Discharge to home or other facility with appropriate resources  Description  INTERVENTIONS:  - Identify barriers to discharge w/patient and caregiver  - Arrange for needed discharge resources and transportation as appropriate  - Identify discharge learning needs (meds, wound care, etc )  - Arrange for interpretive services to assist at discharge as needed  - Refer to Case Management Department for coordinating discharge planning if the patient needs post-hospital services based on physician/advanced practitioner order or complex needs related to functional status, cognitive ability, or social support system  Outcome: Progressing     Problem: INFECTION - ADULT  Goal: Absence or prevention of progression during hospitalization  Description  INTERVENTIONS:  - Assess and monitor for signs and symptoms of infection  - Monitor lab/diagnostic results  - Monitor all insertion sites, i e  indwelling lines, tubes, and drains  - [place] appropriate cooling/warming therapies per order  - Administer medications as ordered  - Instruct and encourage patient and family to use good hand hygiene technique  - Identify and instruct in appropriate isolation precautions for identified infection/condition   Outcome: Progressing   Monitor for signs and symptoms/ administer medication as ordered

## 2020-01-20 NOTE — ADDENDUM NOTE
Addendum  created 01/20/20 1310 by María Elena Kemp MD    Attestation recorded in [address] [place] [address], Child order released for a procedure order, Intraprocedure Attestations filed, Intraprocedure Blocks edited, Sign clinical note

## 2020-01-20 NOTE — ANESTHESIA PROCEDURE NOTES
Peripheral Block    Patient location during procedure: post-op  Start time: 1/20/2020 12:45 PM  Reason for block: at surgeon's request and post-op pain management  Staffing  Performed: anesthesiologist   Preanesthetic Checklist  Completed: patient identified, site marked, surgical consent, pre-op evaluation, timeout performed, IV checked, risks and benefits discussed and monitors and equipment checked  Peripheral Block  Patient position: supine  Prep: ChloraPrep  Patient monitoring: continuous pulse ox and frequent blood pressure checks  Block type: adductor canal block  Laterality: right  Injection technique: single-shot  Procedures: ultrasound guided, Ultrasound guidance required for the procedure to increase accuracy and safety of medication placement and decrease risk of complications    Ultrasound permanent image savedropivacaine (NAROPIN) 0 2% perineural infiltration, 20 mL  Needle  Needle type: Stimuplex   Needle gauge: 21 G  Needle length: 10 cm  Needle insertion depth: 4 cm  Test dose: negative  Assessment  Injection assessment: incremental injection and local visualized surrounding nerve on ultrasound  Paresthesia pain: none  Heart rate change: no  Slow fractionated injection: yes  Post-procedure:  sterile dressing applied and site cleaned  patient tolerated the procedure well with no immediate complications

## 2020-01-20 NOTE — OP NOTE
OPERATIVE REPORT  PATIENT NAME: Juanjose Billingsley    :  1960  MRN: [id number]  Pt Location: [facility] OR ROOM 01    SURGERY DATE: 2020    Surgeon(s) and Role:     * Tiffanie Napier, DO - Primary     * Madisyn Gómez PA-C - Assisting, no qualified resident was available an assistant was needed for patient positioning, prepping and draping, soft tissue retraction, protection of vital structures throughout the case, and to complete the case safely     * Vearl Meals,  ATC/OTC - 2nd Assist    Preop Diagnosis:  Primary osteoarthritis of right knee [M17 11]  Chronic pain of left knee [M25 562, G89 29]  Patellar maltracking right knee    Post-Op Diagnosis Codes:     * Primary osteoarthritis of right knee [M17 11]     * Chronic pain of left knee [M25 562, G89 29]     * Patellar maltracking right knee    Procedure(s) (LRB):  ARTHROPLASTY KNEE TOTAL (Right)    Specimen(s):  * No specimens in log *    Estimated Blood Loss:   20 mL    Drains:  None  Urethral Catheter Latex 16 Fr  (Active)   Number of days: 0       Anesthesia Type:   Spinal with sedation, postoperative adductor canal block    Intravenous fluids:  1000 cc, 250 cc albumin    Antibiotics:  Ancef 2 g    Urine output:  Dillard:  150 cc    Tourniquet time:  63 minutes at 250 mmHg    Implants: Depuy Attune size 4 narrow right CR femur, size 3 S+ tibia, size 8 mm AOX polyethylene, size 32 polyethylene patella      Operative Indications:  Primary osteoarthritis of right knee [M17 11]  Chronic pain of left knee [M25 562, G89 29]  Patella maltracking right knee  Patient is a very pleasant 17-year-old female that is known to me for treatment of her activity related pain, and significant osteoarthritis due to a longstanding history of patellar dislocations and maltracking    On her contralateral extremity she had multiple procedures to address her patellar tracking issues however on this side she had dealt with the pain and the subsequent underlying primary and secondary osteoarthritic change from her history of patellar maltracking and prior dislocation in addition to her tibial femoral osteoarthritis  She has attempted multiple forms of conservative measures in the past all of which failed to provide long-lasting relief of her discomfort  With failure of conservative treatment, persistent activity-related pain, severe osteoarthritis and patella maltracking of her right knee I recommended that she undergo right total knee arthroplasty  She was agreeable to this  She was seen and cleared by her PCP and her cardiologist prior to the procedure  The risks and benefits of undergoing right total knee arthroplasty were discussed at length please see the outpatient orthopedic record  Patient underwent right total knee arthroplasty 1/20/2020  Operative Findings:  Preoperative range of motion demonstrates 5° of recurvatum to 125°  There was hypermobility with and lateral tracking of her patella  After exposure was complete 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  There were grade 4 degenerative changes within the medial femoral condyle as well  There were grade 3 changes diffusely in the lateral compartment  Ultimately a cemented right total knee arthroplasty was successfully implanted  Range of motion was from 0-130 degrees with excellent stability at 0° 30° and 90°  The patella tracked midline and flat throughout passive range of motion  After closure of the arthrotomy range of motion, stability, patellar tracking was unchanged and there were no patellar tracking issues  Patient tolerated the procedure well  There were no complications  Complications:   None    Procedure and Technique:  The patient was identified and marked in the preoperative holding area  Final questions were addressed  Consents were visualized for correct laterality and the intended procedure   Patient was taken back to the operating room where they were transferred to the operating room table and administered spinal anesthesia by the anesthesia staff  The patient's range of motion demonstrated 5° of recurvatum to 125° of flexion  There was hypermobility and lateral tracking of her patella  Her patella could not be frankly dislocated  Tourniquet was then applied to the right thigh  The right lower extremity was prepped and draped in the standard sterile fashion with the addition of the Reynoso knee positioner  The patient was administered 2 g of IV Ancef  Tranexamic acid was administered by the anesthesia staff  A final timeout was performed and all members of the operating room staff were in agreement of the intended procedure and the correct laterality was confirmed  An anterior knee incision was marked over the anterior right knee and carried over the medial portion of the patella down medial to the tibial tubercle  The leg was exsanguinated and the tourniquet was inflated to 250 mmHg  An incision was made over the anterior knee using a scalpel, and sharp dissection was carried deep through Bharti's fascia creating full thickness flaps  The extensor mechanism was identified  A standard medial parapatellar arthrotomy was performed using a scalpel, and upon doing so benign-appearing yellow intra-articular fluid was expressed  The anterior horn of the medial and lateral meniscus was removed  50% of the patellar fat pad was removed for proper exposure  The distal arthrotomy was used to initiate a medial release around the proximal tibia at the joint line to the mid coronal plane  On inspection there was diffuse grade for degenerative change in the medial compartment and the patellofemoral compartment  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  there was diffuse grade 3 changes throughout the lateral compartment  The intramedullary drill for the femur was introduced into the medullary canal anterior to the PCL    The distal femoral cutting jig was inserted after being set to 5° of valgus and a 9 mm resection  The distal femoral cutting jig was pinned in place at the 0 position  The distal femur was cut using an oscillating saw  The distal femoral sizing rotation guide was applied to the distal femur and femoral rotation was set to match Meeker's line and confirmed with the epicondylar axis  This was found to be 3° of external rotation and its position was pinned  The femur was sized to be a size 4  The sizing rotation guide was removed and a size 4 four-in-one cutting block was applied to the distal femur  Checking the anterior resection this appeared to be the appropriate size both in the AP and ML planes, and would not cause anterior femoral notching  The block was pinned in place and the anterior, posterior, posterior chamfer, and anterior chamfer cuts were made in succession more protecting all periarticular soft tissues and ligaments  The cutting block was removed and the perimeter of the femur was cleaned from remaining osteophytes using a rongeur  The ACL and PCL were removed using electrocautery  The femoral notch guide was then applied to the distal femur and the femoral notch was finished  The tibia was subluxed anteriorly in preparation for the tibial resection  The remaining portions of the medial and lateral meniscus were removed using a 15 blade being sure not to violate the MCL or LCL  The area of the lateral geniculate artery was then cauterized using electrocautery  On inspection of the tibia there mild erosion of the medial tibial plateau and no erosion of the lateral tibial plateau  The tibial cutting jig and extra-medullary alignment guide was applied to the leg  The level of resection was set to the osteochondral junction off the medial tibial plateau  The patient's native slope was evaluated and it was found that 5° of posterior slope was appropriate     The jig was pinned in the medial lateral proximal tibia  Varus valgus alignment was confirmed and was appropriate at neutral to slight varus of the mechanical axis  With the cutting jig pinned into place and proximal tibia was resected while protecting soft tissues  The proximal tibial osteophytes were removed  A trial tibial baseplate was placed upon the proximal tibia to check alignment and the alignment was unchanged and remained in neutral  The proximal tibia was sized and a size 3 baseplate was found to be appropriate and provided good cortical fit  The baseplate was pinned into place in appropriate rotation aligning with the medial third of the tibial tubercle  The tibia was then reamed and broached in sequence in preparation for trialing  Trialing was initiated with a size 5 polyethylene insert placed upon the size 3 tibial baseplate  The size 4 standard femur was then placed upon the femur and trialing began  The a mm polyethylene trial demonstrated balanced medial and lateral gaps in flexion and extension while maintaining full extension and reaching 130 ° of flexion  Coronal stability testing revealed that medial lateral gaps were symmetric and extension, mid flexion, and full flexion  The knee demonstrated excellent stability with a range of motion from 0-130 ° of flexion  It was deemed that a narrow femur would be appropriate for the patient's anatomy  The femoral lug drills were drilled, and the trials were removed  Attention was then turned to the patella  The osteo synovial reflection was revealed using a Bovie  The patella height measured 21  millimeters prior to resection  The patella was sized and found to be a 32  mm patellar button  The cutting guide was set for the appropriate depth and the patella was evenly resected using oscillating saw  The 32  mm patellar button was then medialized over the patella and the lug holes were drilled   A trial patella button was placed upon the patella and the pre osteotomy patellar height was restored to 22 mm  A lateral facetectomy of the patella was performed  The tibial trial was removed and the knee was irrigated and debris was removed  The soft tissues of the posterior capsule were cauterized using Bovie to ensure hemostasis  Remaining fragments of the posterior medial and lateral meniscus were removed  The posterior capsule and medial and lateral periarticular soft tissues were injected with a periarticular analgesic cocktail  The knee was again irrigated in preparation for cementation  The tibia was subluxed and all retractors were in place for cementation when final implants were confirmed and placed upon the back table  2 bags of [place] cement without gentamicin were mixed  And the final size 3 S+  tibial implant, 8 mm AOX CR polyethylene insert, size 4 narrow right CR femur, and 32 mm patella button were cemented in sequence  Excess cement was removed after each implant was in place  As the cement cured the remainder of the periarticular pain cocktail was injected into the soft tissues around the knee  Irrigation then followed  After the cement had cured the joint was inspected for any residual loose bodies of cement and these were removed  The tourniquet was released after 63 minutes at 250 mmHg  The tracking and stability of the final components were assessed  The patella tracked midline without tilt, and the knee was stable in both flexion and extension, coronal stability was unchanged, and the knee demonstrated range of motion from 0-130 °  The knee was again irrigated and a very conservative partial synovectomy was performed  Hemostasis was achieved using electrocautery  Closure began using a #2 barbed bidirectional suture for the arthrotomy  After closure of the arthrotomy range of motion to gravity was tested and was found to be 0-130 ° of flexion  Quarter percent Marcaine plain was injected in the subcutaneous soft tissues    The deep subcutaneous tissues were reapproximated with undyed 0 Vicryl, the superficial subcutaneous tissues were reapproximated with undyed 2-0 Vicryl, the skin edges reapproximated with a running 3-0 bidirectional barbed Monocryl suture, and the skin edges were sealed with Dermabond  After the Dermabond had dried a silver impregnated Mepilex dressing was placed over the incision  The drapes were removed and FILIPE stockings were placed on the bilateral lower extremities  Patient was then awakened from anesthesia without difficulty, and transferred to PACU in stable condition          I was present for the entire procedure    Patient Disposition:  PACU     SIGNATURE: Brendon Rivas DO  DATE: January 20, 2020  TIME: 12:29 PM

## 2020-01-20 NOTE — ANESTHESIA POSTPROCEDURE EVALUATION
Post-Op Assessment Note    CV Status:  Stable  Pain Score: 0    Pain management: adequate     Mental Status:  Alert   Hydration Status:  Stable and euvolemic   PONV Controlled:  None   Airway Patency:  Patent   Post Op Vitals Reviewed: Yes      Staff: CRNA   Comments: vss- report to PACU RN- pt resting comfortably          BP   118/65   Temp   97 0   Pulse  92   Resp   18   SpO2   96

## 2020-01-20 NOTE — ANESTHESIA PROCEDURE NOTES
Spinal Block    Patient location during procedure: OR  Start time: 1/20/2020 10:08 AM  Reason for block: procedure for pain, at surgeon's request, post-op pain management and primary anesthetic  Staffing  Resident/CRNA: Velasquez Mendieta CRNA  Performed: resident/CRNA   Preanesthetic Checklist  Completed: patient identified, site marked, surgical consent, pre-op evaluation, timeout performed, IV checked, risks and benefits discussed and monitors and equipment checked  Spinal Block  Patient position: sitting  Prep: site prepped and draped  Patient monitoring: heart rate  Approach: midline  Location: L2-3  Injection technique: single-shot  Needle  Needle type: pencil-tip   Needle gauge: 25 G (8 9)  Needle length: 8 9 cm  Assessment  Sensory level: T4  Events: cerebrospinal fluid  Injection Assessment:  negative aspiration for heme, no paresthesia on injection and positive aspiration for clear CSF    Post-procedure:  site cleaned

## 2020-01-20 NOTE — H&P
Assessment/Plan:  1  Primary osteoarthritis of right knee  XR knee 3 vw right non injury     Case request operating room: ARTHROPLASTY KNEE TOTAL     Comprehensive metabolic panel     Hemoglobin A1C W/EAG Estimation     CBC and differential     Iron Panel (Includes Iron Saturation, Iron, and TIBC)     Protime-INR     APTT     Type and screen     Ambulatory referral to Family Practice     Ambulatory referral to Physical Therapy     If Symptomatic, order: UA w Reflex to Microscopic w Reflex to Culture     MRSA culture     Ambulatory referral to Cardiology     EKG 12 lead     XR chest pa & lateral     Case request operating room: ARTHROPLASTY KNEE TOTAL     zinc sulfate (ZINCATE) 220 mg capsule     ferrous sulfate 324 (65 Fe) mg     folic acid (FOLVITE) 1 mg tablet     cholecalciferol (VITAMIN D3) 1,000 units tablet     ascorbic Acid (VITAMIN C) 500 MG CPCR   2  Chronic pain of left knee  XR knee 3 vw right non injury     Case request operating room: ARTHROPLASTY KNEE TOTAL     Comprehensive metabolic panel     Hemoglobin A1C W/EAG Estimation     CBC and differential     Iron Panel (Includes Iron Saturation, Iron, and TIBC)     Protime-INR     APTT     Type and screen     Ambulatory referral to Family Practice     Ambulatory referral to Physical Therapy     If Symptomatic, order: UA w Reflex to Microscopic w Reflex to Culture     MRSA culture     Ambulatory referral to Cardiology     EKG 12 lead     XR chest pa & lateral     Case request operating room: ARTHROPLASTY KNEE TOTAL     zinc sulfate (ZINCATE) 220 mg capsule     ferrous sulfate 324 (65 Fe) mg     folic acid (FOLVITE) 1 mg tablet     cholecalciferol (VITAMIN D3) 1,000 units tablet     ascorbic Acid (VITAMIN C) 500 MG CPCR   3  Patellar malalignment syndrome of right knee  Ambulatory referral to Family Practice     Ambulatory referral to Cardiology   4  Iron deficiency  Ambulatory referral to Family Practice     Ambulatory referral to Cardiology   5  Pre-operative laboratory examination  Comprehensive metabolic panel     Hemoglobin A1C W/EAG Estimation     CBC and differential     Iron Panel (Includes Iron Saturation, Iron, and TIBC)     Protime-INR     APTT     Type and screen     Ambulatory referral to Family Practice     Ambulatory referral to Physical Therapy     If Symptomatic, order: UA w Reflex to Microscopic w Reflex to Culture     MRSA culture     Ambulatory referral to Cardiology     EKG 12 lead     XR chest pa & lateral           Scribe Attestation    I,:   Gabriela Miranda am acting as a scribe while in the presence of the attending physician :        I,:   Bright Breaux DO personally performed the services described in this documentation    as scribed in my presence  :            Please see below the last office encounter to serve today's H&P  Patient was seen examined preoperative holding area and been no changes in her orthopedic exam since last evaluation  The patient has been seen and cleared by her PCP and cardiologist prior to the procedure  Patient denies any recent illness or changes in her overall health  She denies any fever, chills, headache, nausea, vomiting, blurred vision, chest pain, trouble breathing  All of her questions were addressed today  She will be a candidate for outpatient physical therapy following her discharge from the hospital   Proceed to OR for right total knee arthroplasty  1/20/2020 vitals  /86   Pulse 86   Temp 98 3 °F (36 8 °C) (Tympanic)   Resp 18   Wt 75 4 kg (166 lb 2 oz)   LMP 09/01/2010 (Approximate)   SpO2 100%   BMI 33 55 kg/m²       Tricia Suresh is a very pleasant 42-year-old female who presents today for follow-up evaluation of her chronic right knee pain  Unfortunately, she failed to find lasting relief following her most recent corticosteroid injection    She is suffering with severe osteoarthritis with a near complete loss of joint space medially and end-stage arthritic change in the patellofemoral joint with maltracking and an underlying meniscus tear  At this point, she has failed extensive conservative management including activity modification, maintenance of appropriate weight, physical therapy and home exercise program, analgesics and anti-inflammatory medications, and corticosteroid injection and continues to struggle with daily pain that limits her activities of daily living and enjoyment  Due to this and due to her underlying pathologies, I recommended that she undergo a right total knee arthroplasty to address all of her pathologies with one procedure  The pre sinai and postoperative expectations were discussed here in the office today  The risks and benefits of undergoing right total knee arthroplasty were discussed at length and consents were signed and placed in the chart   Please see risk discussion below  The patient denies a history of DVT/PE, diabetes, malignancy, MRSA infection, GI bleeding or peptic ulcer  Her hormone replacement treatment is topical only and does not increase her risk for postoperative DVT  She does understand that she will need clearance from her primary care physician and cardiologist   She is an excellent candidate for outpatient physical therapy  All of her questions and concerns were addressed today and she will meet with my surgery scheduler to pick a date for her procedure and make preoperative arrangements  I look forward to seeing her back in the office postoperatively, 2 weeks after surgery      Subjective: Follow-up evaluation for chronic right knee pain     Patient ID: Cecy Shea is a 64 y o  female      HPI  Alirio Patterson is a very pleasant 61-year-old female who presents today for follow-up evaluation for chronic right knee pain  At her last appointment in September of 2018 a corticosteroid injection was administered for pain  She states that she received approximately one week of relief following this injection    At that time, she had a return her activity related right knee pain  At today's visit, she states that she has continued to experience a severe aching pain about the medial aspect of her knee that is worse with activity and only somewhat better at rest   She has failed to find lasting relief despite extensive conservative treatment and does state that she is ready to undergo a total knee replacement  She denies any new injury or trauma  She denies any distal paresthesias of the lower extremity  She denies any mechanical symptoms about the knee      Review of Systems   Constitutional: Positive for activity change  Negative for chills, fever and unexpected weight change  HENT: Negative for hearing loss, nosebleeds and sore throat  Eyes: Negative for pain, redness and visual disturbance  Respiratory: Negative for cough, shortness of breath and wheezing  Cardiovascular: Negative for chest pain, palpitations and leg swelling  Gastrointestinal: Negative for abdominal pain, nausea and vomiting  Endocrine: Negative for polydipsia and polyuria  Genitourinary: Negative for dysuria and hematuria  Musculoskeletal: Positive for arthralgias and gait problem  Negative for joint swelling and myalgias  See HPI   Skin: Negative for rash and wound  Neurological: Negative for dizziness, numbness and headaches  Psychiatric/Behavioral: Negative for decreased concentration and suicidal ideas   The patient is not nervous/anxious              Medical History        Past Medical History:   Diagnosis Date    Seasonal allergies      Skin lesion of left lower extremity 2019    Torn medial meniscus       2016  last assessed            Surgical History         Past Surgical History:   Procedure Laterality Date     SECTION        KNEE ARTHROSCOPY, MEDIAL PATELLO FEMORAL LIGAMENT REPAIR         patellar closed    KNEE SURGERY         with medial meniscus repair    REPLACEMENT TOTAL KNEE                      Family History   Problem Relation Age of Onset    Hypertension Mother      No Known Problems Father      Colon cancer Maternal Grandfather [de-identified]    No Known Problems Daughter      No Known Problems Maternal Grandmother      No Known Problems Paternal Grandmother      Pancreatic cancer Maternal Aunt 76    No Known Problems Daughter      No Known Problems Paternal Aunt      Breast cancer Neg Hx           Social History            Occupational History    Not on file   Tobacco Use    Smoking status: Never Smoker    Smokeless tobacco: Never Used   Substance and Sexual Activity    Alcohol use: No       Comment: social drinker in all scripts    Drug use: No    Sexual activity: Not on file            Current Outpatient Medications:     alendronate (FOSAMAX) 70 mg tablet, Take 1 tablet (70 mg total) by mouth every 7 days, Disp: 4 tablet, Rfl: 5    estradiol (ESTRACE) 0 1 mg/g vaginal cream, Insert 1 g into the vagina 3 (three) times a week, Disp: 42 5 g, Rfl: 3    ascorbic Acid (VITAMIN C) 500 MG CPCR, Take 1 capsule (500 mg total) by mouth 2 (two) times a day, Disp: 60 capsule, Rfl: 0    cholecalciferol (VITAMIN D3) 1,000 units tablet, Take 1 tablet (1,000 Units total) by mouth daily, Disp: 30 tablet, Rfl: 0    ferrous sulfate 324 (65 Fe) mg, Take 1 tablet (324 mg total) by mouth daily before breakfast, Disp: 30 tablet, Rfl: 0    folic acid (FOLVITE) 1 mg tablet, Take 1 tablet (1 mg total) by mouth daily, Disp: 30 tablet, Rfl: 0    zinc sulfate (ZINCATE) 220 mg capsule, Take 1 capsule (220 mg total) by mouth daily, Disp: 30 capsule, Rfl: 0     No Known Allergies     Objective:      Vitals:     11/26/19 0948   BP: 126/86   Pulse: 69         Body mass index is 33 93 kg/m²      Right Knee Exam      Tenderness   The patient is experiencing tenderness in the medial joint line and patella      Range of Motion   Right knee extension: 0     Right knee flexion: 130       Tests   Varus: negative Valgus: negative  Drawer: Anterior - negative    Posterior - negative  Patellar apprehension: trace     Other   Erythema: absent  Scars: present (Well-healed arthroscopic portals)  Sensation: normal  Pulse: present  Swelling: none  Effusion: no effusion present     Comments:  Stable at 0, 30 and 90°  Neurovascularly intact distally   No warmth, erythema or signs of infection  Parapatellar crepitance noted  Patellofemoral grind:  Positive  Patella tracks laterally-with significant crepitance              Observations      Right Knee   Negative for effusion          Physical Exam   Constitutional: She is oriented to person, place, and time  She appears well-developed and well-nourished  HENT:   Head: Normocephalic and atraumatic  Eyes: Pupils are equal, round, and reactive to light  Conjunctivae and EOM are normal    Neck: Normal range of motion  Neck supple  Cardiovascular: Normal rate and intact distal pulses  Pulmonary/Chest: Effort normal  No respiratory distress  Musculoskeletal:        Right knee: She exhibits no effusion  As noted in HPI   Neurological: She is alert and oriented to person, place, and time  Skin: Skin is warm and dry  Psychiatric: She has a normal mood and affect  Her behavior is normal    Nursing note and vitals reviewed         I have personally reviewed pertinent films in PACS      X-ray of the right knee obtained on 11/26/2019 shows severe osteoarthritis as evidenced by joint space narrowing with near bone-on-bone contact in the medial compartment, end-stage changes in the patellofemoral compartment, sclerosis, and marginal osteophytosis       The patient was counseled in detail regarding the diagnosis, the treatment options available, the prognosis of each treatment option, the potential risks and complications  [place] are, but are not limited to; deep vein thrombosis, pulmonary embolism, neurologic and vascular injury, infection, instability, leg length discrepancy, dislocation, hematoma, reflex sympathetic dystrophy, loss of range of motion, ankylosis of the knee, fracture, screw or prosthetic perforation, chronic pain, acute pain, chronic leg pain and edema, loosening, death, heart attack, and stroke   The patient's questions were answered in detail   The patient demonstrates understanding of these risks and wishes to proceed with surgery

## 2020-01-20 NOTE — PROGRESS NOTES
Progress Note - Orthopedics   Erick Santoro 64 y o  female MRN: [id number]  Unit/Bed#: OR POOL Encounter: [id number]    Assessment:  1) POD#0 s/p R TKA    Plan:  Ancef 2 g IV x 2 for 24 hours postop  DVT prophylaxis: ASA 325mg PO BID/SCD's/Ambulation  WBAT  PT/OT- WBAT  Analgesia PRN  Follow up AM labs  D/c linda in AM POD #1 after OOB with PT  Dressing- monitor for drainage  Discharge planning - discharge pending progress with PT postoperatively  Plan for discharge to home to start outpatient physical therapy on postoperative day 1    Weight bearing: WBAT    VTE Pharmacologic Prophylaxis: ASA 325mg PO BID  VTE Mechanical Prophylaxis: sequential compression device    Subjective:  Patient seen examined at bedside  Patient reports some nausea  Pain controlled  Events of surgery discussed with the patient and the patient's family  Vitals: Blood pressure 135/86, pulse 86, temperature 98 3 °F (36 8 °C), temperature source Tympanic, resp  rate 18, weight 75 4 kg (166 lb 2 oz), last menstrual period 09/01/2010, SpO2 100 %, not currently breastfeeding  ,Body mass index is 33 55 kg/m²  Intake/Output Summary (Last 24 hours) at 1/20/2020 1232  Last data filed at 1/20/2020 1204  Gross per 24 hour   Intake 1250 ml   Output 170 ml   Net 1080 ml       Invasive Devices     Peripheral Intravenous Line            Peripheral IV 01/20/20 Left Wrist less than 1 day          Drain            Urethral Catheter Latex 16 Fr  less than 1 day                Physical Exam: NAD  Ortho Exam: RLE: Dsg c/d/i, compartments soft, calf non-tender, +PF/DF/EHL, +DP/SP/Saph/Sural SILT, DP 2+, foot warm    Lab, Imaging and other studies: PO XR R knee:  Reviewed and acceptable    Glenna FERREIRA    Division of Adult Reconstruction  Department of Orthopaedic Surgery  [facility]

## 2020-01-20 NOTE — PHYSICAL THERAPY NOTE
PT EVALUATION       01/20/20 6020   Note Type   Note type Eval/Treat   Pain Assessment   Pain Assessment 0-10   Pain Score 7   Pain Type Acute pain;Surgical pain   Pain Location Knee   Pain Orientation Right   Home Living   Type of Home House  (split level)   Home Layout Two level; Able to live on main level with bedroom/bathroom  (split level: 5 +5 up /down  0 HAO)   Bathroom Shower/Tub Tub/shower unit   [employer] bars in shower; Shower chair   Home Equipment Cane   Prior Function   Level of [place] Independent with ADLs and functional mobility   Lives With Spouse;Daughter   Receives Help From Family   ADL Assistance Independent   IADLs Independent   Comments will need a rolling walker   Restrictions/Precautions   Weight Bearing Precautions Per Order Yes   RLE Weight Bearing Per Order WBAT   Other Precautions Fall Risk;Pain   General   Additional Pertinent History POD #0 for right TKA   Family/Caregiver Present Yes  (, daughter)   Cognition   Overall Cognitive Status WFL   Arousal/Participation Cooperative   Orientation Level Oriented X4   Following Commands Follows multistep commands with increased time or repetition   Comments pt was nauseous and still slightly lethargic due to sedation   RUE Assessment   RUE Assessment WFL   LUE Assessment   LUE Assessment WFL   RLE Assessment   RLE Assessment   (0 - 90 deg )   LLE Assessment   LLE Assessment WFL  (strength WFLs)   Bed Mobility   Supine to Sit 4  Minimal assistance   Additional items Assist x 1;Verbal cues;LE management   Sit to Supine 4  Minimal assistance   Additional items Assist x 1;Verbal cues;LE management   Transfers   Sit to Stand 4  Minimal assistance   Additional items Assist x 1;Verbal cues; Increased time required   Stand to Sit 4  Minimal assistance   Additional items Assist x 1   Ambulation/Elevation   Gait Assistance 4  Minimal assist   Additional items Assist x 1;Verbal cues; Tactile cues   Assistive Device Rolling walker Distance 2 steps   Balance   Static Sitting Good   Dynamic Sitting Fair +  (with RW)   Static Standing Fair   Dynamic Standing Fair -  (with RW)   Ambulatory Fair -  (with RW)   Activity Tolerance   Activity Tolerance Patient limited by fatigue  (limited by nausea and slightly lethargic)   Nurse Made Aware yes: trell  pt was nauseous but no c/o pain   Assessment   Prognosis Good   Problem List Decreased endurance;Decreased strength; Impaired balance;Decreased mobility; Decreased skin integrity;Pain   Assessment Patient seen for Physical Therapy evaluation  Patient admitted with S/P TKR (total knee replacement) using cement, right  Comorbidities affecting patient's physical performance include:h/o knee arthroscopy, medial patello femoral ligament repair, Today; nausea   Personal factors affecting patient at time of initial evaluation include: lives in [facility] two story house, inability to ambulate household distances, inability to navigate community distances, inability to navigate level surfaces without external assistance, inability to perform ADLS and inability to perform IADLS   Prior to admission, patient was independent with functional mobility without assistive device, independent with ADLS, independent with IADLS, living with  and daughter in a bi  level home with 0 steps to enter, ambulating household distance and ambulating community distances  Please find objective findings from Physical Therapy assessment regarding body systems outlined above with impairments and limitations including weakness, decreased ROM, impaired balance, decreased endurance, gait deviations, pain, decreased activity tolerance, decreased functional mobility tolerance, fall risk and decreased skin integrity  The Barthel Index was used as a functional outcome tool presenting with a score of 35 today indicating marked limitations of functional mobility and ADLS    Patient's clinical presentation is currently unstable/unpredictable as seen in patient's presentation of changing level of pain, increased fall risk, new onset of impairment of functional mobility, decreased endurance and new onset of weakness  Pt would benefit from continued Physical Therapy treatment to address deficits as defined above and maximize level of functional mobility  As demonstrated by objective findings, the assigned level of complexity for this evaluation is moderate  Goals   Patient Goals to do more once less nauseous   Plan   Treatment/Interventions ADL retraining;Functional transfer training;LE strengthening/ROM; Elevations; Therapeutic exercise; Endurance training;Patient/family training;Equipment eval/education; Bed mobility;Gait training;Spoke to nursing;Spoke to case management;OT;Family   PT Frequency Twice a day   Recommendation   Recommendation Home with family support; Outpatient PT   Equipment Recommended Walker  (rolling walker)   Additional Comments treatment limited by nausea   Barthel Index   Feeding 10   Bathing 0   Grooming Score 0   Dressing Score 5   Bladder Score 0   Bowels Score 10   Toilet Use Score 5   Transfers (Bed/Chair) Score 5   Mobility (Level Surface) Score 0   Stairs Score 0   Barthel Index Score 35   Licensure   [place] License Number  Aaliyah Zurita PT  [id number]     Goals:   1/27/20  1) indep with all transfers to edge of bed and sit <> stand with use of RW  2)  Indep amb  With RW for household distances with good balance  LTGs: 2/3/20:  1) Indep amb  With RW for community distances to allow pt to return home and attend OPPT  2: indep navigating 5 stairs with one railing and cane to allow pt to get to her main living level of her bilevel home  PT TREATMENT  1530- 15: 40  10 min  S:   Pt requesting to continue with walking when she is not nauseous  O:  Pt transferred sit <> stand with RW and Min A     Instructed with use of UE to off load RLE when advancing LLE to achieve WBAT R   Pt able to wgt shift right and left, and complete a few mini marches  Pt then asked to try to take a few steps along bed to the left before sitting back on bed  Pt able to take two small steps, then sat on bed  Returned sit > supine with Min A for management of RLE  Ice pack applied to right knee, all needs in reach  RN made aware of nausea  A: Pt limited with this session by onset of nausea once sitting a edge of bed  This did not subside, so will continue with more gait tomorrow  P: Recommend: home with family support and OPPT  [facility] PT  [id number]

## 2020-01-21 LAB
ANION GAP SERPL CALCULATED.3IONS-SCNC: 7 MMOL/L (ref 4–13)
BASOPHILS # BLD AUTO: 0.01 THOUSANDS/ΜL (ref 0–0.1)
BASOPHILS NFR BLD AUTO: 0 % (ref 0–1)
BUN SERPL-MCNC: 10 MG/DL (ref 5–25)
CALCIUM SERPL-MCNC: 8.4 MG/DL (ref 8.3–10.1)
CHLORIDE SERPL-SCNC: 103 MMOL/L (ref 100–108)
CO2 SERPL-SCNC: 28 MMOL/L (ref 21–32)
CREAT SERPL-MCNC: 0.92 MG/DL (ref 0.6–1.3)
EOSINOPHIL # BLD AUTO: 0.01 THOUSAND/ΜL (ref 0–0.61)
EOSINOPHIL NFR BLD AUTO: 0 % (ref 0–6)
ERYTHROCYTE [DISTWIDTH] IN BLOOD BY AUTOMATED COUNT: 12.4 % (ref 11.6–15.1)
GFR SERPL CREATININE-BSD FRML MDRD: 69 ML/MIN/1.73SQ M
GLUCOSE SERPL-MCNC: 120 MG/DL (ref 65–140)
HCT VFR BLD AUTO: 32.4 % (ref 34.8–46.1)
HGB BLD-MCNC: 10.6 G/DL (ref 11.5–15.4)
IMM GRANULOCYTES # BLD AUTO: 0.03 THOUSAND/UL (ref 0–0.2)
IMM GRANULOCYTES NFR BLD AUTO: 0 % (ref 0–2)
LYMPHOCYTES # BLD AUTO: 1.02 THOUSANDS/ΜL (ref 0.6–4.47)
LYMPHOCYTES NFR BLD AUTO: 13 % (ref 14–44)
MCH RBC QN AUTO: 30.7 PG (ref 26.8–34.3)
MCHC RBC AUTO-ENTMCNC: 32.7 G/DL (ref 31.4–37.4)
MCV RBC AUTO: 94 FL (ref 82–98)
MONOCYTES # BLD AUTO: 0.42 THOUSAND/ΜL (ref 0.17–1.22)
MONOCYTES NFR BLD AUTO: 5 % (ref 4–12)
NEUTROPHILS # BLD AUTO: 6.22 THOUSANDS/ΜL (ref 1.85–7.62)
NEUTS SEG NFR BLD AUTO: 82 % (ref 43–75)
NRBC BLD AUTO-RTO: 0 /100 WBCS
PLATELET # BLD AUTO: 248 THOUSANDS/UL (ref 149–390)
PMV BLD AUTO: 10.5 FL (ref 8.9–12.7)
POTASSIUM SERPL-SCNC: 4.5 MMOL/L (ref 3.5–5.3)
RBC # BLD AUTO: 3.45 MILLION/UL (ref 3.81–5.12)
SODIUM SERPL-SCNC: 138 MMOL/L (ref 136–145)
WBC # BLD AUTO: 7.71 THOUSAND/UL (ref 4.31–10.16)

## 2020-01-21 PROCEDURE — 97116 GAIT TRAINING THERAPY: CPT

## 2020-01-21 PROCEDURE — 97167 OT EVAL HIGH COMPLEX 60 MIN: CPT

## 2020-01-21 PROCEDURE — 85025 COMPLETE CBC W/AUTO DIFF WBC: CPT | Performed by: PHYSICIAN ASSISTANT

## 2020-01-21 PROCEDURE — 80048 BASIC METABOLIC PNL TOTAL CA: CPT | Performed by: PHYSICIAN ASSISTANT

## 2020-01-21 PROCEDURE — 97535 SELF CARE MNGMENT TRAINING: CPT

## 2020-01-21 PROCEDURE — 99024 POSTOP FOLLOW-UP VISIT: CPT | Performed by: ORTHOPAEDIC SURGERY

## 2020-01-21 PROCEDURE — 97530 THERAPEUTIC ACTIVITIES: CPT

## 2020-01-21 PROCEDURE — 97110 THERAPEUTIC EXERCISES: CPT

## 2020-01-21 RX ADMIN — FLUTICASONE PROPIONATE 1 SPRAY: 50 SPRAY, METERED NASAL at 08:44

## 2020-01-21 RX ADMIN — OXYCODONE HYDROCHLORIDE 10 MG: 10 TABLET ORAL at 16:11

## 2020-01-21 RX ADMIN — ASPIRIN 325 MG: 325 TABLET, FILM COATED ORAL at 08:44

## 2020-01-21 RX ADMIN — GABAPENTIN 200 MG: 100 CAPSULE ORAL at 18:36

## 2020-01-21 RX ADMIN — HYDROMORPHONE HYDROCHLORIDE 0.5 MG: 1 INJECTION, SOLUTION INTRAMUSCULAR; INTRAVENOUS; SUBCUTANEOUS at 11:31

## 2020-01-21 RX ADMIN — DEXAMETHASONE SODIUM PHOSPHATE 10 MG: 4 INJECTION, SOLUTION INTRAMUSCULAR; INTRAVENOUS at 12:22

## 2020-01-21 RX ADMIN — SODIUM CHLORIDE 75 ML/HR: 0.9 INJECTION, SOLUTION INTRAVENOUS at 04:01

## 2020-01-21 RX ADMIN — ACETAMINOPHEN 975 MG: 325 TABLET, FILM COATED ORAL at 05:31

## 2020-01-21 RX ADMIN — CEFAZOLIN SODIUM 2000 MG: 2 SOLUTION INTRAVENOUS at 02:21

## 2020-01-21 RX ADMIN — FLUTICASONE PROPIONATE 1 SPRAY: 50 SPRAY, METERED NASAL at 18:38

## 2020-01-21 RX ADMIN — DOCUSATE SODIUM 100 MG: 100 CAPSULE, LIQUID FILLED ORAL at 18:36

## 2020-01-21 RX ADMIN — SODIUM CHLORIDE, SODIUM LACTATE, POTASSIUM CHLORIDE, AND CALCIUM CHLORIDE 1000 ML: .6; .31; .03; .02 INJECTION, SOLUTION INTRAVENOUS at 09:58

## 2020-01-21 RX ADMIN — OXYCODONE HYDROCHLORIDE 10 MG: 10 TABLET ORAL at 08:45

## 2020-01-21 RX ADMIN — OXYCODONE HYDROCHLORIDE 5 MG: 5 TABLET ORAL at 03:59

## 2020-01-21 RX ADMIN — ACETAMINOPHEN 975 MG: 325 TABLET, FILM COATED ORAL at 21:41

## 2020-01-21 RX ADMIN — ASPIRIN 325 MG: 325 TABLET, FILM COATED ORAL at 18:36

## 2020-01-21 RX ADMIN — PANTOPRAZOLE SODIUM 40 MG: 40 TABLET, DELAYED RELEASE ORAL at 05:31

## 2020-01-21 RX ADMIN — ASPIRIN 325 MG: 325 TABLET, FILM COATED ORAL at 00:11

## 2020-01-21 RX ADMIN — ACETAMINOPHEN 975 MG: 325 TABLET, FILM COATED ORAL at 16:10

## 2020-01-21 RX ADMIN — CELECOXIB 100 MG: 100 CAPSULE ORAL at 08:44

## 2020-01-21 RX ADMIN — GABAPENTIN 200 MG: 100 CAPSULE ORAL at 08:44

## 2020-01-21 RX ADMIN — DOCUSATE SODIUM 100 MG: 100 CAPSULE, LIQUID FILLED ORAL at 08:45

## 2020-01-21 RX ADMIN — CELECOXIB 100 MG: 100 CAPSULE ORAL at 18:36

## 2020-01-21 NOTE — UTILIZATION REVIEW
Notification of Inpatient Admission/Inpatient Authorization Request   This is a Notification of Inpatient Admission for [address]  Be advised that this patient was admitted to our facility under Inpatient Status  Contact Consuelo Saavedra at [phone] for additional admission information  Radha Shankar  DEPT  DEDICATED -[phone]  Patient Name:   Anthony Gonsalez   YOB: 1960       [address]   [address]:   [address]  Tax ID: [id number]  NPI: [id number] Attending Provider/NPI: Narendra Carrera Do [[id number]]     celia   Place of Service Code: 24     Place of Service Name:  Inpatient Hospital   Start Date: 1/21/20 1225     Discharge Date & Time: No discharge date for patient encounter  Type of Admission: Inpatient Status Discharge Disposition (if discharged): Final discharge disposition not confirmed   Patient Diagnoses: Primary osteoarthritis of right knee [M17 11]  Chronic pain of left knee [M25 562, G89 29]     Orders: Admission Orders (From admission, onward)     Ordered        01/21/20 1225  Inpatient Admission  Once                    Assigned Utilization Review Contact: Bryan Saavedra  Utilization   Network Utilization Review Department  Phone: [phone]; Fax [phone]  Email: [email or web address]   ATTENTION PAYERS: Please call the assigned Utilization  directly with any questions or concerns ALL voicemails in the department are confidential  Send all requests for admission clinical reviews, approved or denied determinations and any other requests to dedicated fax number belonging to the campus where the patient is receiving treatment

## 2020-01-21 NOTE — PHYSICAL THERAPY NOTE
PT TREATMENT     01/21/20 1400   Pain Assessment   Pain Assessment 0-10   Pain Score 5   Pain Location Knee   Pain Orientation Right   Restrictions/Precautions   RLE Weight Bearing Per Order WBAT   Other Precautions Fall Risk;Pain   General   Chart Reviewed Yes   Additional Pertinent History Pt's BP taken in sitting and standing 140/90   Cognition   Overall Cognitive Status WFL   Subjective   Subjective "feeling much better this afternoon"   Bed Mobility   Supine to Sit 4  Minimal assistance   Additional items LE management   Sit to Supine 4  Minimal assistance   Additional items LE management   Transfers   Sit to Stand 4  Minimal assistance   Stand to Sit 4  Minimal assistance   Stand pivot 4  Minimal assistance   Additional items   (with walker)   Additional Comments Pt stood at bathroom sink to brush her teeth with supervision  Ambulation/Elevation   Gait pattern   (guarded, no knee buckling)   Additional items Verbal cues   Assistive Device Rolling walker   Distance x 15 feet, 2x 30 feet   Balance   Static Sitting Good   Dynamic Sitting Fair +   Static Standing Fair +   Dynamic Standing Fair   Exercises   Quad Sets 10 reps;Right   Heelslides Right;AROM;AAROM; Supine;Sitting   Hip Flexion AAROM; Right;10 reps; Supine  (SLR)   Knee AROM Short Arc Quad Right;AAROM;10 reps   Ankle Pumps 20 reps; Supine;Bilateral;AROM   TKR   (ROM 0-60 R knee)   Assessment   Prognosis Good   Problem List Decreased strength;Decreased range of motion; Impaired balance;Decreased mobility;Pain   Assessment Pt demonstrates improved blood pressure and tolerance to functional activity  Pt was able to ambulate with only mild c/o dizziness without being hypotensive as she was during the am session  Pt demonstrates a quad lag and limited flexion ROM of her R knee  Encouraged pt to work on [employer]  Also encouraged pt to ambulate in halls with staff this evening  Pt will need to practice stair climbing next session    She prefers to try a crutch and the rail (pt has both a crutch and cane)  Anticipate pt will be able to go home post op day 2 from PT point of view  Plan   Treatment/Interventions ADL retraining;Functional transfer training;LE strengthening/ROM; Therapeutic exercise; Endurance training;Gait training;Bed mobility; Equipment eval/education;Patient/family training;Elevations   Progress Progressing toward goals   PT Frequency Twice a day   Recommendation   Recommendation Home with family support; Outpatient PT   Equipment Recommended   (rolling walker)   Licensure   [place] License Number  Kyara Human PT [id number]

## 2020-01-21 NOTE — UTILIZATION REVIEW
Initial Clinical Review  PATIENT WAS AN OUTPATIENT NO CHARGE BED 1/20/20 FOR ARTHROPLASTY KNEE TOTAL RIGHT   CONVERTED TO INPATIENT ADMISSION 1/21/20  FOR NAUSEA AND HYPOTENSION  NEEDING >2MN FOR COMORBID CONDITIONS     Admission: Date/Time/Statement: Inpatient Admission Orders (From admission, onward)     Ordered        01/21/20 1225  Inpatient Admission  Once                   Orders Placed This Encounter   Procedures    Inpatient Admission     Standing Status:   Standing     Number of Occurrences:   1     Order Specific Question:   Admitting Physician     Answer:   Charles Sampson     Order Specific Question:   Level of Care     Answer:   Med Surg [16]     Order Specific Question:   Estimated length of stay     Answer:   More than 2 Midnights     Order Specific Question:   Certification     Answer:   I certify that inpatient services are medically necessary for this patient for a duration of greater than two midnights  See H&P and MD Progress Notes for additional information about the patient's course of treatment  Assessment/Plan:   SURGEON  RLE: Dsg c/d/i, compartments soft, calf non-tender, +PF/DF/EHL, +DP/SP/Saph/Sural SILT, DP 2+, foot warm     A/P: POD#1 s/p R TKA  Patient was seen examined at bedside today  The patient did have nausea and some hypotension with physical therapy this morning  The patient was medicated her nausea was resolved and she was also provided a bolus of normal saline solution which made her feel better  With these symptoms during physical therapy of feel that it is better for her to remain under close observation as an inpatient for 1 more day as she progresses with physical therapy  Adjustments were made in her postoperative medication regimen  Her labs were evaluated and remained within the expected postoperative ranges    She will continue to work with physical therapy today and tomorrow we will plan for discharge to home tomorrow with outpatient PT services arranged for later this week      This patient underwent a procedure not on the inpatient only list and therefore is subject to the 2 midnight benchmark  Accordingly, in my judgement, the patient will require at least 2 midnights in the hospital receiving acute medical care  The patient is noted to have comorbid conditions which will require management in the sinai-operative period prior to safe discharge  As such the patient will require acute care beyond the usual and routine recovery period for the procedure alone and is therefore appropriate for inpatient admission      ED Triage Vitals   Temperature Pulse Respirations Blood Pressure SpO2   01/20/20 0846 01/20/20 0846 01/20/20 0846 01/20/20 0846 01/20/20 0846   98 3 °F (36 8 °C) 86 18 135/86 100 %      Temp Source Heart Rate Source Patient Position - Orthostatic VS BP Location FiO2 (%)   01/20/20 0846 01/20/20 0846 01/21/20 0700 01/21/20 0700 --   Tympanic Monitor Lying Right arm       Pain Score       01/20/20 0847       No Pain        Wt Readings from Last 1 Encounters:   01/20/20 75 4 kg (166 lb 3 6 oz)     Additional Vital Signs:       61  18  116/65  82  98 %  Nasal cannula    Lying   01/21/20 09:50:20    66  18  101/52  68  87 %Abnormal   None (Room air)    Lying   01/21/20 09:47:35    58  18  96/44Abnormal   61  95 %      Lying       Pertinent Labs/Diagnostic Test Results:   1/21/20 xr knee right Unremarkable appearance of total knee arthroplasty    Results from last 7 days   Lab Units 01/21/20  0530   WBC Thousand/uL 7 71   HEMOGLOBIN g/dL 10 6*   HEMATOCRIT % 32 4*   PLATELETS Thousands/uL 248   NEUTROS ABS Thousands/µL 6 22     Results from last 7 days   Lab Units 01/21/20  0530   SODIUM mmol/L 138   POTASSIUM mmol/L 4 5   CHLORIDE mmol/L 103   CO2 mmol/L 28   ANION GAP mmol/L 7   BUN mg/dL 10   CREATININE mg/dL 0 92   EGFR ml/min/1 73sq m 69   CALCIUM mg/dL 8 4     Results from last 7 days   Lab Units 01/21/20  0530   GLUCOSE RANDOM mg/dL 120 ED Treatment:   Medication Administration - No Administrations Displayed (No Start Event Found)     None        Past Medical History:   Diagnosis Date    Seasonal allergies     Sinus congestion     developed nasal congestion and cough 12/24/19- is going to call PCP today for RX; advised to call surgeon if not better next week   Skin lesion of left lower extremity 8/22/2019    Torn medial meniscus     2oct2016  last assessed     Present on Admission:  **None**      Admitting Diagnosis: Primary osteoarthritis of right knee [M17 11]  Chronic pain of left knee [M25 562, G89 29]  Age/Sex: 64 y o  female  Admission Orders:  INPATIENT ADMIT  POD 1  IS  PT OT WBAT  BMP CBC DIFF  IV LACTATED RINGERS X4 1L BOLUSES  IV DILAUDID 0 5MG  OXYCODONE 10MG X2  OXYCODONE 5MG X1   Scheduled Medications:    Medications:  acetaminophen 975 mg Oral Q8H   aspirin 325 mg Oral BID   celecoxib 100 mg Oral BID   docusate sodium 100 mg Oral BID   fluticasone 1 spray Nasal BID   gabapentin 200 mg Oral BID   pantoprazole 40 mg Oral Early Morning     Continuous IV Infusions:     PRN Meds:    aluminum-magnesium hydroxide-simethicone 30 mL Oral Q6H PRN   HYDROmorphone 0 5 mg Intravenous Q2H PRN   lactated ringers 1,000 mL Intravenous Once PRN   And      lactated ringers 1,000 mL Intravenous Once PRN   magnesium hydroxide 30 mL Oral Daily PRN   ondansetron 4 mg Intravenous Q6H PRN   oxyCODONE 10 mg Oral Q4H PRN   oxyCODONE 5 mg Oral Q4H PRN   sodium chloride 1,000 mL Intravenous Once PRN   And      sodium chloride 1,000 mL Intravenous Once PRN       None    Network Utilization Review Department  [email or web address]  ATTENTION: Please call with any questions or concerns to [phone] and carefully listen to the prompts so that you are directed to the right person   All voicemails are confidential   Teagan Evans all requests for admission clinical reviews, approved or denied determinations and any other requests to dedicated fax number below belonging to the campus where the patient is receiving treatment   List of dedicated fax numbers for the Facilities:  [address] DENIALS (Administrative/Medical Necessity) [phone]   [address]  (Maternity/NICU/Pediatrics) [phone]   Javidelorissonny Hernandez [phone]   [place] [place] [phone]   [facility] [place] [phone]   Merlinda Brilliant [phone]   [address] [place] [address] [phone]   [facility]  [phone]   [address] [facility]  [address] [address] [address] [facility] [phone]

## 2020-01-21 NOTE — PROGRESS NOTES
Progress Note - Orthopedics   Mila Mullen 64 y o  female MRN: [id number]  Unit/Bed#: [address] [place] [address] Encounter: [id number]    Assessment:  1) POD#1 s/p R TKA    Plan:  Ancef 2 g IV x 2 for 24 hours postop - completed  DVT prophylaxis: ASA 325mg PO BID/SCD's/Ambulation  PT/OT- WBAT RLE  Analgesia PRN - rely on oral meds  Follow up AM labs - H/H 10 6/32 4, episode of dizziness yesterday, will continue to monitor, okay to DC IVF, continue to monitor for symptoms  [place]illard DC - monitor for proper void  Dressing- monitor for drainage, Mepilex may remain in place for 7 days  Discharge planning - discharge today or tomorrow to begin outpatient PT pending progress with PT postoperatively  Weight bearing: WBAT    VTE Pharmacologic Prophylaxis: ASA 325mg PO BID  VTE Mechanical Prophylaxis: sequential compression device    Subjective:  Patient seen and examined at bedside  No overnight events  She attempted to stand with PT yesterday but was dizzy and nauseous and had to sit back down  She denies any dizziness or lightheadedness currently  She denies any chest pain, shortness of breath, paresthesias, or other symptoms  Her pain is controlled    Vitals: Blood pressure 116/65, pulse 61, temperature 98 4 °F (36 9 °C), temperature source Tympanic, resp  rate 18, height 4' 11" (1 499 m), weight 75 4 kg (166 lb 3 6 oz), last menstrual period 09/01/2010, SpO2 98 %, not currently breastfeeding  ,Body mass index is 33 57 kg/m²        Intake/Output Summary (Last 24 hours) at 1/21/2020 1046  Last data filed at 1/21/2020 1022  Gross per 24 hour   Intake 2500 ml   Output 2720 ml   Net -220 ml       Invasive Devices     Peripheral Intravenous Line            Peripheral IV 01/20/20 Left Wrist 1 day          Drain            Urethral Catheter Latex 16 Fr  1 day                Physical Exam: NAD, A&Ox3, sitting comfortably in bed  Ortho Exam: RLE: right anterior knee dsg c/d/i, compartments soft, calf non-tender, +PF/DF/EHL, +DP/SP/Saph/Sural SILT, DP 2+, foot warm, TEDs/foot pumps in place    Lab, Imaging and other studies: PO XR R knee:  Reviewed and acceptable alignment of right total knee prosthesis with no evidence of periprosthetic fracture, dislocation, retained foreign body    Lab Results   Component Value Date    WBC 7 71 01/21/2020    HGB 10 6 (L) 01/21/2020    HCT 32 4 (L) 01/21/2020    MCV 94 01/21/2020     01/21/2020     Lab Results   Component Value Date    SODIUM 138 01/21/2020    K 4 5 01/21/2020     01/21/2020    CO2 28 01/21/2020    AGAP 7 01/21/2020    BUN 10 01/21/2020    CREATININE 0 92 01/21/2020    GLUC 120 01/21/2020    GLUF 97 12/26/2019    CALCIUM 8 4 01/21/2020    AST 15 12/26/2019    ALT 18 12/26/2019    ALKPHOS 98 12/26/2019    TP 8 1 12/26/2019    TBILI 0 40 12/26/2019    EGFR 69 01/21/2020     Patrice Fonseca PA-C

## 2020-01-21 NOTE — PHYSICAL THERAPY NOTE
PT TREATMENT     01/21/20 0926   Pain Assessment   Pain Assessment 0-10   Pain Score 4  (pt received pain meds prior to PT)   Pain Location Knee   Pain Orientation Right   Restrictions/Precautions   RLE Weight Bearing Per Order WBAT   Other Precautions Pain; Fall Risk   General   Chart Reviewed Yes   Cognition   Overall Cognitive Status WFL   Subjective   Subjective "I hope I can walk"   Bed Mobility   Supine to Sit 3  Moderate assistance   Additional items LE management   Sit to Supine 3  Moderate assistance   Additional items LE management   Transfers   Sit to Stand 4  Minimal assistance   Additional items Increased time required   Stand to Sit 4  Minimal assistance   Ambulation/Elevation   Gait pattern   (slow mario, short step length)   Gait Assistance 4  Minimal assist   Additional items Verbal cues; Tactile cues   Assistive Device Rolling walker   Distance 5 feet   Balance   Static Sitting Fair +   Dynamic Sitting Fair   Static Standing Fair   Dynamic Standing Fair -   Activity Tolerance   Activity Tolerance Treatment limited secondary to medical complications/low BP   Exercises   Quad Sets 10 reps; Supine;Right   Heelslides   (not tolerated)   Ankle Pumps 20 reps; Supine;Bilateral;AROM   TKR   (knee ROM not measured due to hypotension/pain)   Assessment   Prognosis Good   Problem List Decreased strength;Decreased range of motion;Decreased endurance; Impaired balance;Decreased mobility;Pain   Assessment Pt continues to complain of dizziness and nausea when standing  Pt only able to walk a few steps before needing to sit then be transferred to supine due to low BP  BP reading 70/40 sitting in chair then  RN present to monitor BPs when pt supine 96/44  Plan   Treatment/Interventions ADL retraining;Functional transfer training;LE strengthening/ROM; Elevations; Therapeutic exercise; Endurance training;Gait training;Bed mobility; Equipment eval/education;Patient/family training   Progress Progressing toward goals PT Frequency Twice a day   Recommendation   Recommendation Home with family support; Outpatient PT   Equipment Recommended   (rolling walker)   Licensure   [place] License Number  Myla Khan PT [id number]

## 2020-01-21 NOTE — OCCUPATIONAL THERAPY NOTE
Occupational Therapy Evaluation/Treatment       01/21/20 1054   Note Type   Note type Eval/Treat   Restrictions/Precautions   RLE Weight Bearing Per Order WBAT   Pain Assessment   Pain Assessment 0-10   Pain Score 7   Pain Type Acute pain;Surgical pain   Pain Location Knee   Pain Orientation Right   Hospital Pain Intervention(s) Repositioned;Distraction; Emotional support  Hipolito Boeck, RN aware)   Response to Interventions some relief   Home Living   Type of Home House  (split level)   Home Layout Multi-level; Able to live on main level with bedroom/bathroom  ( split level: 5 +5 up /down; 0 HAO)   Bathroom Shower/Tub Tub/shower unit   [employer] Grab bars in shower; Shower chair;Hand-held shower   Bathroom Accessibility Accessible   Home Equipment Cane;Crutches; Wheelchair-manual   Prior Function   Level of [place] Independent with ADLs and functional mobility   Lives With Spouse;Daughter   Receives Help From Family   ADL Assistance Independent   IADLs Independent   Psychosocial   Psychosocial (WDL) WDL   Subjective   Subjective "I'm still nauseous "   ADL   Eating Assistance 5  Supervision/Setup   Grooming Assistance 4  Minimal Assistance   UB Bathing Assistance 4  Minimal Assistance   LB Bathing Assistance 3  Moderate Assistance   [address] 3  Moderate [address] 3  Moderate [address]  3  Moderate Assistance   Functional Deficit Setup;Verbal cueing; Increased time to complete   Additional Comments due to nausea, dizziness, pain, and decreased strength/balance/endurance s/p right TKA   Bed Mobility   Supine to Sit 3  Moderate assistance   Additional items Assist x 1;LE management   Sit to Supine 3  Moderate assistance   Additional items Assist x 1;LE management   Balance   Static Sitting Fair   Dynamic Sitting Fair -   Activity Tolerance   Activity Tolerance Patient limited by pain; Patient limited by fatigue;Treatment limited secondary to medical complications (Comment)  (nausea and low BP)   Nurse Janie Ramirez, RN   RUE Assessment   RUE Assessment WFL   LUE Assessment   LUE Assessment WFL   Hand Function   Gross Motor Coordination Functional   Fine Motor Coordination Functional   Cognition   Overall Cognitive Status WFL   Arousal/Participation Alert; Cooperative   Attention Attends with cues to redirect   Orientation Level Oriented X4   Memory Within functional limits   Following Commands Follows multistep commands with increased time or repetition   Assessment   Limitation Decreased ADL status; Decreased UE strength;Decreased Safe judgement during ADL;Decreased endurance;Decreased self-care trans;Decreased high-level ADLs  (decreased balance)   Prognosis Good   Assessment Patient evaluated by Occupational Therapy  Patient admitted with S/P TKR (total knee replacement) using cement, right  The patients occupational profile, medical and therapy history includes a extensive additional review of physical, cognitive, or psychosocial history related to current functional performance  Comorbidities affecting functional mobility and ADLS include: Torn medial meniscus, knee arthroscopy, medial patello femoral ligament repair  Prior to admission, patient was independent with functional mobility without assistive device, independent with ADLS, independent with IADLS and living with family in a split level home with no steps to enter  The evaluation identifies the following performance deficits: weakness, decreased ROM, impaired balance, decreased endurance, increased fall risk, new onset of impairment of functional mobility, decreased ADLS, decreased IADLS, pain, decreased activity tolerance, decreased safety awareness, ortheopedic restrictions and decreased strength, that result in activity limitations and/or participation restrictions   This evaluation requires clinical decision making of high complexity, because the patient presents with comorbidites that affect occupational performance and required significant modification of tasks or assistance with consideration of multiple treatment options  The Barthel Index was used as a functional outcome tool presenting with a score of 35, indicating marked limitations of functional mobility and ADLS  Patient will benefit from skilled Occupational Therapy services to address above deficits and facilitate a safe return to prior level of function  Goals   Patient Goals no more nausea   STG Time Frame   (1-7)   Short Term Goal #1 Patient will increase standing tolerance to 5 minutes during ADL task to decrease assistance level and decrease fall risk; Patient will increase bed mobility to supervision in preparation for ADLS and transfers; Patient will increase functional mobility to and from bathroom with rolling walker with supervision to increase performance with ADLS and to use a toilet; Patient will perform BUE ROM/strengthening HEP with supervision and min cues to increase general activity tolerance and performance in ADLS/IADLS and transfers; Patient will improve functional activity tolerance to 15 minutes of sustained functional tasks to increase participation in basic self-care and decrease assistance level;  Patient will be able to to verbalize understanding and perform energy conservation/proper body mechanics during ADLS and functional mobility at least 75% of the time with minimal cueing to decrease signs of fatigue and increase stamina to return to prior level of function; Patient will increase static/dynamic sitting balance to supervision to improve the ability to sit at edge of bed or on a chair for ADLS;  Patient will increase static/dynamic standing balance to supervision to improve postural stability and decrease fall risk during standing ADLS and transfers      LTG Time Frame   (8-14)   Long Term Goal #1 Patient will increase standing tolerance to 10 minutes during ADL task to decrease assistance level and decrease fall risk; Patient will increase bed mobility to independent in preparation for ADLS and transfers; Patient will increase functional mobility to and from bathroom with rolling walker independently to increase performance with ADLS and to use a toilet; Patient will perform BUE ROM/strengthening HEP independently to increase general activity tolerance and performance in ADLS/IADLS and transfers; Patient will improve functional activity tolerance to 20 minutes of sustained functional tasks to increase participation in basic self-care and decrease assistance level;  Patient will be able to to verbalize understanding and perform energy conservation/proper body mechanics during ADLS and functional mobility at least 90% of the time with nocueing to decrease signs of fatigue and increase stamina to return to prior level of function; Patient will increase static/dynamic sitting balance to independent to improve the ability to sit at edge of bed or on a chair for ADLS;  Patient will increase static/dynamic standing balance to independent to improve postural stability and decrease fall risk during standing ADLS and transfers  Functional Transfer Goals   Pt Will Perform All Functional Transfers With min assist;With assistive devices; With cueing; With good judgment/safety  (LTG- Independent)   ADL Goals   Pt Will Perform All ADL's In chair; With min assist;With adaptive equipment;Maintaining weight bearing status; With good judgment/safety; With setup; With cues  (LTG- Independent)   Plan   Treatment Interventions ADL retraining;Functional transfer training;UE strengthening/ROM; Endurance training;Patient/family training;Equipment evaluation/education; Compensatory technique education; Energy conservation; Activityengagement   Goal Expiration Date 02/04/20   OT Frequency 3-5x/wk   Additional Treatment Session   Start Time 5145   End Time 1054   Treatment Assessment Pt received lying in bed due to nausea and episode of low BP earlier this morning  Sat at edge of bed for less than a minute and requested she lie down due to nausea  Pt seen for ADL training  Education and training begun with pt regarding adaptive equipment options (long-handled reacher, LH shoe horn, LH bath sponge, sock aide, elastic shoelaces) and their correct usage to maximize independence with ADLs while adhering to orthopedic restrictions, if needed  Pt also able to to verbalize understanding and perform energy conservation/proper body mechanics during ADLS and functional mobility at least 75% of the time with minimal cueing to decrease signs of fatigue and increase stamina to return to prior level of function  Pt demonstrating good verbal understanding of all information provided and all questions answered  Pt reporting 7/10 pain at right knee  Should progress well with therapy  Patient left in bed with all needs within reach, SCD pumps and tab alarm in place  Cont OT per POC  Recommendation   OT Discharge Recommendation Home with family support  (and out-patient PT)   Barthel Index   Feeding 5   Bathing 0   Grooming Score 0   Dressing Score 5   Bladder Score 0   Bowels Score 10   Toilet Use Score 5   Transfers (Bed/Chair) Score 10   Mobility (Level Surface) Score 0   Stairs Score 0   Barthel Index Score 35   Licensure   [place] License Number  Marya Acosta Sender Kennedi Sam 89, OTR/L, [place] Lic# [id number]

## 2020-01-21 NOTE — PLAN OF CARE
Problem: PAIN - ADULT  Goal: Verbalizes/displays adequate comfort level or baseline comfort level  Description  Interventions:  - Encourage patient to monitor pain and request assistance  - Assess pain using appropriate pain scale  - Administer analgesics based on type and severity of pain and evaluate response  - Implement non-pharmacological measures as appropriate and evaluate response  - Consider cultural and social influences on pain and pain management  - Notify physician/advanced practitioner if interventions unsuccessful or patient reports new pain  Outcome: Progressing     Problem: SAFETY ADULT  Goal: Patient will remain free of falls  Description  INTERVENTIONS:  - Assess patient frequently for physical needs  -  Identify cognitive and physical deficits and behaviors that affect risk of falls    -  [place] fall precautions as indicated by assessment   - Educate patient/family on patient safety including physical limitations  - Instruct patient to call for assistance with activity based on assessment  - Modify environment to reduce risk of injury  - Consider OT/PT consult to assist with strengthening/mobility  Outcome: Progressing  Goal: Maintain or return to baseline ADL function  Description  INTERVENTIONS:  -  Assess patient's ability to carry out ADLs; assess patient's baseline for ADL function and identify physical deficits which impact ability to perform ADLs (bathing, care of mouth/teeth, toileting, grooming, dressing, etc )  - Assess/evaluate cause of self-care deficits   - Assess range of motion  - Assess patient's mobility; develop plan if impaired  - Assess patient's need for assistive devices and provide as appropriate  - Encourage maximum independence but intervene and supervise when necessary  - Involve family in performance of ADLs  - Assess for home care needs following discharge   - Consider OT consult to assist with ADL evaluation and planning for discharge  - Provide patient education as appropriate  Outcome: Progressing  Goal: Maintain or return mobility status to optimal level  Description  INTERVENTIONS:  - Assess patient's baseline mobility status (ambulation, transfers, stairs, etc )    - Identify cognitive and physical deficits and behaviors that affect mobility  - Identify mobility aids required to assist with transfers and/or ambulation (gait belt, sit-to-stand, lift, walker, cane, etc )  - [place] fall precautions as indicated by assessment  - Record patient progress and toleration of activity level on Mobility SBAR; progress patient to next Phase/Stage  - Instruct patient to call for assistance with activity based on assessment  - Consider rehabilitation consult to assist with strengthening/weightbearing, etc   Outcome: Progressing     Problem: DISCHARGE PLANNING  Goal: Discharge to home or other facility with appropriate resources  Description  INTERVENTIONS:  - Identify barriers to discharge w/patient and caregiver  - Arrange for needed discharge resources and transportation as appropriate  - Identify discharge learning needs (meds, wound care, etc )  - Arrange for interpretive services to assist at discharge as needed  - Refer to Case Management Department for coordinating discharge planning if the patient needs post-hospital services based on physician/advanced practitioner order or complex needs related to functional status, cognitive ability, or social support system  Outcome: Progressing     Problem: INFECTION - ADULT  Goal: Absence or prevention of progression during hospitalization  Description  INTERVENTIONS:  - Assess and monitor for signs and symptoms of infection  - Monitor lab/diagnostic results  - Monitor all insertion sites, i e  indwelling lines, tubes, and drains  - [place] appropriate cooling/warming therapies per order  - Administer medications as ordered  - Instruct and encourage patient and family to use good hand hygiene technique  - Identify and instruct in appropriate isolation precautions for identified infection/condition   Outcome: Progressing

## 2020-01-22 VITALS
SYSTOLIC BLOOD PRESSURE: 133 MMHG | OXYGEN SATURATION: 97 % | HEIGHT: 59 IN | WEIGHT: 166.23 LBS | RESPIRATION RATE: 20 BRPM | BODY MASS INDEX: 33.51 KG/M2 | DIASTOLIC BLOOD PRESSURE: 77 MMHG | HEART RATE: 89 BPM | TEMPERATURE: 98.7 F

## 2020-01-22 LAB
ANION GAP SERPL CALCULATED.3IONS-SCNC: 6 MMOL/L (ref 4–13)
BASOPHILS # BLD AUTO: 0.01 THOUSANDS/ΜL (ref 0–0.1)
BASOPHILS NFR BLD AUTO: 0 % (ref 0–1)
BUN SERPL-MCNC: 9 MG/DL (ref 5–25)
CALCIUM SERPL-MCNC: 8.7 MG/DL (ref 8.3–10.1)
CHLORIDE SERPL-SCNC: 104 MMOL/L (ref 100–108)
CO2 SERPL-SCNC: 31 MMOL/L (ref 21–32)
CREAT SERPL-MCNC: 0.72 MG/DL (ref 0.6–1.3)
EOSINOPHIL # BLD AUTO: 0.01 THOUSAND/ΜL (ref 0–0.61)
EOSINOPHIL NFR BLD AUTO: 0 % (ref 0–6)
ERYTHROCYTE [DISTWIDTH] IN BLOOD BY AUTOMATED COUNT: 12.7 % (ref 11.6–15.1)
GFR SERPL CREATININE-BSD FRML MDRD: 93 ML/MIN/1.73SQ M
GLUCOSE SERPL-MCNC: 111 MG/DL (ref 65–140)
HCT VFR BLD AUTO: 29.5 % (ref 34.8–46.1)
HGB BLD-MCNC: 9.7 G/DL (ref 11.5–15.4)
IMM GRANULOCYTES # BLD AUTO: 0.03 THOUSAND/UL (ref 0–0.2)
IMM GRANULOCYTES NFR BLD AUTO: 0 % (ref 0–2)
LYMPHOCYTES # BLD AUTO: 1.5 THOUSANDS/ΜL (ref 0.6–4.47)
LYMPHOCYTES NFR BLD AUTO: 20 % (ref 14–44)
MCH RBC QN AUTO: 30.6 PG (ref 26.8–34.3)
MCHC RBC AUTO-ENTMCNC: 32.9 G/DL (ref 31.4–37.4)
MCV RBC AUTO: 93 FL (ref 82–98)
MONOCYTES # BLD AUTO: 0.43 THOUSAND/ΜL (ref 0.17–1.22)
MONOCYTES NFR BLD AUTO: 6 % (ref 4–12)
NEUTROPHILS # BLD AUTO: 5.72 THOUSANDS/ΜL (ref 1.85–7.62)
NEUTS SEG NFR BLD AUTO: 74 % (ref 43–75)
NRBC BLD AUTO-RTO: 0 /100 WBCS
PLATELET # BLD AUTO: 233 THOUSANDS/UL (ref 149–390)
PMV BLD AUTO: 10.5 FL (ref 8.9–12.7)
POTASSIUM SERPL-SCNC: 4.1 MMOL/L (ref 3.5–5.3)
RBC # BLD AUTO: 3.17 MILLION/UL (ref 3.81–5.12)
SODIUM SERPL-SCNC: 141 MMOL/L (ref 136–145)
WBC # BLD AUTO: 7.7 THOUSAND/UL (ref 4.31–10.16)

## 2020-01-22 PROCEDURE — 80048 BASIC METABOLIC PNL TOTAL CA: CPT | Performed by: PHYSICIAN ASSISTANT

## 2020-01-22 PROCEDURE — 99024 POSTOP FOLLOW-UP VISIT: CPT | Performed by: ORTHOPAEDIC SURGERY

## 2020-01-22 PROCEDURE — 85025 COMPLETE CBC W/AUTO DIFF WBC: CPT | Performed by: PHYSICIAN ASSISTANT

## 2020-01-22 PROCEDURE — 97110 THERAPEUTIC EXERCISES: CPT

## 2020-01-22 PROCEDURE — 97116 GAIT TRAINING THERAPY: CPT

## 2020-01-22 RX ORDER — ASPIRIN 325 MG
325 TABLET ORAL 2 TIMES DAILY
Qty: 84 TABLET | Refills: 0 | Status: SHIPPED | OUTPATIENT
Start: 2020-01-22 | End: 2020-12-04

## 2020-01-22 RX ORDER — ACETAMINOPHEN 325 MG/1
975 TABLET ORAL EVERY 8 HOURS
Qty: 30 TABLET | Refills: 0
Start: 2020-01-22 | End: 2020-12-04

## 2020-01-22 RX ORDER — OXYCODONE HYDROCHLORIDE 5 MG/1
TABLET ORAL
Qty: 60 TABLET | Refills: 0 | Status: SHIPPED | OUTPATIENT
Start: 2020-01-22 | End: 2020-01-24 | Stop reason: ALTCHOICE

## 2020-01-22 RX ORDER — CELECOXIB 100 MG/1
100 CAPSULE ORAL 2 TIMES DAILY
Qty: 28 CAPSULE | Refills: 0 | Status: SHIPPED | OUTPATIENT
Start: 2020-01-22 | End: 2020-02-20 | Stop reason: ALTCHOICE

## 2020-01-22 RX ORDER — DOCUSATE SODIUM 100 MG/1
100 CAPSULE, LIQUID FILLED ORAL 2 TIMES DAILY
Qty: 60 CAPSULE | Refills: 0 | Status: SHIPPED | OUTPATIENT
Start: 2020-01-22 | End: 2020-02-28 | Stop reason: ALTCHOICE

## 2020-01-22 RX ORDER — ONDANSETRON 4 MG/1
4 TABLET, FILM COATED ORAL EVERY 8 HOURS PRN
Qty: 20 TABLET | Refills: 0 | Status: SHIPPED | OUTPATIENT
Start: 2020-01-22 | End: 2020-02-28 | Stop reason: ALTCHOICE

## 2020-01-22 RX ADMIN — GABAPENTIN 200 MG: 100 CAPSULE ORAL at 17:18

## 2020-01-22 RX ADMIN — GABAPENTIN 200 MG: 100 CAPSULE ORAL at 09:28

## 2020-01-22 RX ADMIN — DOCUSATE SODIUM 100 MG: 100 CAPSULE, LIQUID FILLED ORAL at 17:18

## 2020-01-22 RX ADMIN — CELECOXIB 100 MG: 100 CAPSULE ORAL at 09:27

## 2020-01-22 RX ADMIN — ASPIRIN 325 MG: 325 TABLET, FILM COATED ORAL at 17:18

## 2020-01-22 RX ADMIN — CELECOXIB 100 MG: 100 CAPSULE ORAL at 17:18

## 2020-01-22 RX ADMIN — ACETAMINOPHEN 975 MG: 325 TABLET, FILM COATED ORAL at 05:41

## 2020-01-22 RX ADMIN — ASPIRIN 325 MG: 325 TABLET, FILM COATED ORAL at 09:28

## 2020-01-22 RX ADMIN — OXYCODONE HYDROCHLORIDE 5 MG: 5 TABLET ORAL at 12:02

## 2020-01-22 RX ADMIN — ACETAMINOPHEN 975 MG: 325 TABLET, FILM COATED ORAL at 14:23

## 2020-01-22 RX ADMIN — FLUTICASONE PROPIONATE 1 SPRAY: 50 SPRAY, METERED NASAL at 09:32

## 2020-01-22 RX ADMIN — DOCUSATE SODIUM 100 MG: 100 CAPSULE, LIQUID FILLED ORAL at 09:28

## 2020-01-22 RX ADMIN — FLUTICASONE PROPIONATE 1 SPRAY: 50 SPRAY, METERED NASAL at 17:19

## 2020-01-22 RX ADMIN — PANTOPRAZOLE SODIUM 40 MG: 40 TABLET, DELAYED RELEASE ORAL at 05:41

## 2020-01-22 NOTE — PHYSICAL THERAPY NOTE
PT TREATMENT     01/22/20 1130   Pain Assessment   Pain Assessment 0-10   Pain Score 2   Pain Location Knee   Pain Orientation Right   Restrictions/Precautions   RLE Weight Bearing Per Order WBAT   Other Precautions Pain; Fall Risk   General   Chart Reviewed Yes   Family/Caregiver Present No   Cognition   Overall Cognitive Status WFL   Arousal/Participation Cooperative   Orientation Level Oriented X4   Following Commands Follows all commands and directions without difficulty   Subjective   Subjective c/o slight dizziness and some nausea with exercise, especially knee flexion   Bed Mobility   Supine to Sit 4  Minimal assistance   Additional items LE management;Verbal cues   Sit to Supine 4  Minimal assistance   Additional items LE management   Transfers   Sit to Stand 5  Supervision   Stand to Sit 5  Supervision   Ambulation/Elevation   Gait pattern Step to  (slow mario)   Gait Assistance 5  Supervision   Assistive Device Rolling walker   Distance 25 feet   Stair Management Assistance 5  Supervision   Additional items Assist x 1;Verbal cues   Stair Management Technique One rail R;Step to pattern  (with one crutch )   Number of Stairs 8   Activity Tolerance   Activity Tolerance Patient limited by pain; Patient tolerated treatment well   Nurse Made Aware yes: Josephine   Exercises   Quad Sets Supine;10 reps;Right   Heelslides   (unable to allow AAROM to complete hip/knee flexion 2/2 pain)   Hip Flexion Supine;AAROM;10 reps;Right   Hip Abduction Supine;10 reps;AAROM; Right   Knee AROM Long Arc Quad Sitting;AAROM;10 reps   Ankle Pumps Supine;20 reps;Bilateral   UE Exercise Sitting;10 reps;Right  (seated hip flexion)   Balance training  with use of crutch on stairs   Assessment   Prognosis Good   Problem List Decreased strength;Decreased range of motion;Decreased endurance; Impaired balance;Decreased mobility; Decreased skin integrity;Pain   Assessment Pt requires min A for in/out of bed: uses her pant leg to hold onto to move her leg when needed  Pt is able to navigate stairs with one railing and one crutch  Attempted to use straight cane, however pt felt the crutch was more stable  Pt in chair with lunch tray at end of session  All needs in reach  Will see again in PM   Goals   Patient Goals to go home   Plan   Treatment/Interventions ADL retraining;Functional transfer training;LE strengthening/ROM; Elevations; Therapeutic exercise; Endurance training;Patient/family training;Equipment eval/education; Bed mobility;Gait training;Spoke to nursing;OT;Spoke to case management;Spoke to MD   Progress Progressing toward goals   PT Frequency Twice a day   Recommendation   Recommendation Home with family support; Outpatient PT   Equipment Recommended   (rolling walker)   Additional Comments Pt has her own crutches for the stairs   Licensure   [place] License Number  Ileana Paez PT  [id number]

## 2020-01-22 NOTE — NURSING NOTE
AVS summary given and reviewed  IV access removed and patient tolerated  PCA Alexsander wheeled patient to lobby with wheelchair

## 2020-01-22 NOTE — OCCUPATIONAL THERAPY NOTE
OT Treatment Note       01/22/20 0907   Restrictions/Precautions   RLE Weight Bearing Per Order WBAT   Other Precautions Fall Risk;Pain   Pain Assessment   Pain Assessment 0-10   Pain Score 3   Pain Type Acute pain;Surgical pain   Pain Location Knee   Pain Orientation Right   ADL   Grooming Assistance 7  Independent   Grooming Deficit Wash/dry hands; Wash/dry face   Grooming Comments Standing unsupported to sink   UB Dressing Assistance 7  Independent   UB Dressing Comments Doff gown, don overhead shirt while seated in chair   LB Dressing Assistance 4  Minimal Assistance   LB Dressing Comments Don pants, don/doff bilateral socks    Toileting Assistance  7  Independent   Toileting Deficit Perineal hygiene;Clothing management up;Clothing management down   Toileting Comments In bathroom, pt urinated   Bed Mobility   Supine to Sit 4  Minimal assistance   Additional items LE management   Additional Comments Patient required min assist for RLE management during bed mobility  Patient asking about use of leg , OT educated patient on how to use leg  and where to purchase, emphasized working towards no device to move leg in order to increase RLE strength and ROM, patient verbalized good understanding   Transfers   Sit to Stand 5  Supervision   Stand to Sit 5  Supervision   Functional Mobility   Functional Mobility 5  Supervision   Additional Comments Ambulated to/from bathroom, and short household distance in room with RW and supervision   Toilet Transfers   Toilet Transfer Type To and from   Toilet Transfer to Standard toilet   Toilet Transfer Technique Ambulating   Toilet Transfers Supervision   Toilet Transfers Comments Ambulatory transfer with RW   Cognition   Overall Cognitive Status [facility]   Arousal/Participation Alert; Cooperative   Orientation Level Oriented X4   Following Commands Follows all commands and directions without difficulty   Activity Tolerance   Activity Tolerance Patient tolerated treatment well Assessment   Assessment Patient seen for occupational therapy treatment today  Patient is able to complete ADLs and functional transfers at a supervision to independent level  Patient has DME for bathroom already at home (shower chair) and educated on safe transfer technique  Patient tolerated OT session well   At end of session seated in chair with all needs met   Plan   Goal Expiration Date 02/05/20   OT Frequency 5x/wk   Recommendation   OT Discharge Recommendation Home with family support   Licensure   [place] License Number  [employer], [place] [id number]

## 2020-01-22 NOTE — DISCHARGE SUMMARY
Discharge Summary - Rita Hernandez 64 y o  female MRN: [id number]    Unit/Bed#: [address] [place] [address] Encounter: [id number]    Admission Date:   Admission Orders (From admission, onward)     Ordered        01/21/20 1225  Inpatient Admission  Once                     Admitting Diagnosis: Primary osteoarthritis of right knee [M17 11]  Chronic pain of left knee [M25 562, G89 29]    HPI:  Patient is a very pleasant 51-year-old female known to our practice for activity related right knee pain due to her severe underlying osteoarthritis  She has had multiple patellar dislocations with procedures to address  She has attempted multiple forms of conservative treatment which have failed to provide long-lasting relief of her activity related pain, which is limiting her ability to perform activities of daily living  After discussing risks and benefits, consents were signed and placed into the chart for a right total knee arthroplasty  She received clearance from her primary care physician cardiologist prior to the intended procedure  She presented to the hospital 01/20/2020 for a right total knee arthroplasty with Dr Irma Crowley  Procedures Performed:  Right total knee arthroplasty using cement performed by Dr Irma Crowley on 01/20/2020  No intraoperative complications  Please see operative report for full details    Summary of Hospital Course:  Patient presented to the hospital on 01/20/2020 for a right total knee arthroplasty with Dr Irma Crowley  She received spinal anesthesia with a subsequent adductor canal block in the PACU, which she tolerated well without difficulty or complication  She was transferred to the PACU and subsequently floor in stable condition  She tended to work with physical therapy on postoperative day 0, but upon standing felt dizzy and lightheaded and had to sit down  She did not have any overhead events    On postoperative day 1, the Dillard catheter was removed and she was able to void without difficulty or complication for the remainder of her stay  Her vital signs and laboratory values were monitored  Her laboratory values remained within the normal expected postoperative limits  She did have episodes of hypotension with continued dizziness upon standing  The scopolamine patch was removed and she was given a 1L bolus of fluid, after which she felt significantly improved  She was able to work with physical therapy in the afternoon of postoperative day 1 and made significant progress  After discussing with physical therapy and the patient, it was determined that she would benefit from another night in the hospital for continued evaluation of her hypotension and dizziness as well as continued physical therapy  She did not have any overnight events  On postoperative day 2, her dizziness had resolved  She was able to work with physical therapy in the morning in the afternoon with tremendous success  She did have some nausea when her pain worsen, but this was controlled with Zofran  Her pain remained controlled with oral medication  After discussing with the therapy team and the patient her family, it was determined that she was safe to discharge home to begin outpatient physical therapy later this week  She was given Zofran is an outpatient prescription in case she gets nauseous at home  Discharge instructions were discussed with the patient and her family and all of their questions were addressed  She was discharged home in stable condition in the evening of postoperative day 2        Significant Findings, Care, Treatment and Services Provided:  Right total knee arthroplasty using cement    Complications:  None    Discharge Diagnosis:  Status post right total knee arthroplasty using cement    Resolved Problems  Date Reviewed: 1/22/2020    None          Condition at Discharge: stable         Discharge instructions/Information to patient and family:   See after visit summary for information provided to patient and family  Provisions for Follow-Up Care:  See after visit summary for information related to follow-up care and any pertinent home health orders  PCP: Terra Thompson MD    Disposition: Home to begin outpatient physical therapy later this week    Planned Readmission: No      Discharge Statement   I spent 30 minutes discharging the patient  This time was spent on the day of discharge  I had direct contact with the patient on the day of discharge  Additional documentation is required if more than 30 minutes were spent on discharge  Discharge Medications:  See after visit summary for reconciled discharge medications provided to patient and family

## 2020-01-22 NOTE — SOCIAL WORK
Patient in need of a rolling walker for discharge  SW sent referral to [facility]  Walker delivered to bedside  No other needs anticipated

## 2020-01-22 NOTE — DISCHARGE INSTRUCTIONS
Total Knee Replacement     WHAT YOU SHOULD KNOW:   Total knee replacement is surgery to replace a knee joint damaged by wear, injury, or disease  It is also called a total knee arthroplasty  The knee joint is where your femur (thigh bone) and tibia (large lower leg bone or shin bone) meet  A small bone called the patella (kneecap) protects your knee joint  AFTER YOU LEAVE:     Medicines:   · Pain medicine: You may be given a prescription medicine to decrease pain  Do not wait until the pain is severe before you take this medicine  We recommend that you take Tylenol 975mg every 8 hours for baseline pain control  Oxycodone can be used as needed, but no more than 1-2 tablets every 4 to 6 hours  We have also given you nausea medication called Zofran to take every 8 hours as needed    · NSAIDs:  These medicines decrease swelling, pain, and fever  NSAIDs are available without a doctor's order  Ask your primary healthcare provider which medicine is right for you  Ask how much to take and when to take it  Take as directed  NSAIDs can cause stomach bleeding and kidney problems if not taken correctly  You will be given Celebrex 100 mg to take twice a day for the first 2 weeks after surgery  · Stool softeners  make it easier for you to have a bowel movement  You may need this medicine to treat or prevent constipation  You will be given Colace 100mg to take twice a day    · Anticoagulants  are a type of blood thinner medicine that helps prevent clots  Clots can cause strokes, heart attacks, and death  These medicines may cause you to bleed or bruise more easily  You will be given aspirin 325 mg to take twice a day for 6 weeks after surgery  ¨ Watch for bleeding from your gums or nose  Watch for blood in your urine and bowel movements  Use a soft washcloth and a soft toothbrush  If you shave, use an electric razor  Avoid activities that can cause bruising or bleeding  · Take your medicine as directed  Call your healthcare provider if you think your medicine is not helping or if you have side effects  Tell him if you are allergic to any medicine  Keep a list of the medicines, vitamins, and herbs you take  Include the amounts, and when and why you take them  Bring the list or the pill bottles to follow-up visits  Carry your medicine list with you in case of an emergency  Follow up with your orthopedist as directed: You may need to return to have your wound checked and stitches, staples, or drain removed  Write down your questions so you remember to ask them during your visits  Please make an appointment to see Dr Juan Pablo Menendez 2 weeks postoperatively  Physical therapy:  A physical therapist teaches you exercises to help improve movement and strength, and to decrease pain  You will begin outpatient physical therapy later this week as planned  Self-care:   · Wound Care:  Please leave the Mepilex dressing in place for 7 days after surgery  After 7 days, you may remove the dressing and leave the incision open to air  Do not get the dressing or the incision wet until your seen in the office  If the incision is uncomfortable under clothing after the Mepilex is removed, you may cover it with a a dry sterile dressing, but should remain dry at all times  · Use ice:  Ice helps decrease swelling and pain  Ice may also help prevent tissue damage  Use an ice pack or put crushed ice in a plastic bag  Cover it with a towel, and place it on your knee for 15 to 20 minutes every hour as directed  · Wear pressure stockings: These are long, tight stockings that put pressure on your legs to promote blood flow and prevent clots  · Use a knee brace, cane, walker, or crutches as directed: These devices will help decrease your risk of falling  Contact your orthopedist if:  · You have a fever over 101 0°F     · You have trouble moving or bending your joint      · You have increased pain and swelling in your joint, even after you take pain medicine  · You have back pain or lower leg pain when you bend your foot upwards  · You have questions or concerns about your condition or care  Seek immediate care or call 911 if:   · You feel like you are going to faint  · Blood soaks through/saturates your bandage  · Your incision comes apart  · Your incision is red, swollen, or draining pus  · You cannot walk or move your joint, or the limb is numb  · Your arm or leg feels warm, tender, and painful  It may look swollen and red  · You have a seizure or feel confused  · You feel lightheaded, short of breath, and have chest pain  · You have chest pain when you take a deep breath or cough  You may cough up blood  © 2014 [address] is for End User's use only and may not be sold, redistributed or otherwise used for commercial purposes  All illustrations and images included in CareNotes® are the copyrighted property of A D A M , Inc  or Guzman Subramanian  The above information is an  only  It is not intended as medical advice for individual conditions or treatments  Talk to your doctor, nurse or pharmacist before following any medical regimen to see if it is safe and effective for you

## 2020-01-22 NOTE — PHYSICAL THERAPY NOTE
PT TREATMENT     01/22/20 3631   Pain Assessment   Pain Assessment 0-10   Pain Score 7  (2 at rest; 7 when putting foot to floor)   Pain Location Knee   Pain Orientation Right   Restrictions/Precautions   Weight Bearing Precautions Per Order Yes   RLE Weight Bearing Per Order WBAT   Other Precautions Pain; Fall Risk   General   Chart Reviewed Yes   Family/Caregiver Present Yes  (daughter)   Cognition   Overall Cognitive Status WFL   Arousal/Participation Cooperative   Following Commands Follows all commands and directions without difficulty   Subjective   Subjective Report that she will be staying on main level once inside house   Bed Mobility   Supine to Sit 4  Minimal assistance   Additional items Assist x 1;LE management;Verbal cues   Sit to Supine 4  Minimal assistance   Additional items Assist x 1;LE management;Verbal cues   Transfers   Sit to Stand 7  Independent   Stand to Sit 7  Independent   Toilet transfer 7  Independent   Additional items Standard toilet   Additional Comments pt went into bathroom and back out after using the toilet without assistance  Ambulation/Elevation   Gait pattern Step to  (extension lag)   Gait Assistance 6  Modified independent   Assistive Device Rolling walker   Distance 120 feet with changes in direction   Balance   Ambulatory Good  (with RW)   Activity Tolerance   Activity Tolerance Patient limited by pain   Exercises   Quad Sets Supine;10 reps;Right   Heelslides   (unable to tolerate in supine)   Hip Flexion Sitting;10 reps;Right   Hip Abduction Supine;10 reps;AAROM; Right   Knee Flexion Stretch Sitting;Right  (3 reps with 20 sec hold with towel under foot to slide on fl)   Knee AROM Long Arc Quad Sitting;AAROM;5 reps;Right   Ankle Pumps Supine;20 reps;Bilateral   TKR   (5 deg ext with leg extended on bed, 60 deg flex at EOB)   Assessment   Prognosis Good   Problem List Decreased strength;Decreased range of motion;Decreased endurance; Impaired balance;Decreased mobility; Decreased skin integrity;Pain   Assessment Pt verbally instructed on entering and exiting a car  Pt verbalized understanding  Pt has decreased pain tolerance and will benefit from OPPT to improve all ROM and strength  diffuculty with completing quad sets; frequent verbal and tactile cues  Instructed with passive stretch when home and working on leg extension when seated at EOB with foot in dorsiflexion  Pt able to return demonstrate same  cont  with OPPT   Goals   Patient Goals "I don't want to go into rehab in the "negative"  Plan   Treatment/Interventions ADL retraining;Functional transfer training;LE strengthening/ROM; Elevations; Therapeutic exercise; Endurance training;Patient/family training;Equipment eval/education; Bed mobility;Gait training;Spoke to case management;OT   Progress Progressing toward goals   PT Frequency Twice a day   Recommendation   Recommendation Outpatient PT; Home with family support   Equipment Recommended   (rolling walker:  notified )   Licensure   [place] Number  Aaliyah Aguilera PT  [id number]

## 2020-01-22 NOTE — PLAN OF CARE
Problem: OCCUPATIONAL THERAPY ADULT  Goal: Performs self-care activities at highest level of function for planned discharge setting  See evaluation for individualized goals  Description       Outcome: Progressing  Note:   Limitation: Decreased ADL status, Decreased UE strength, Decreased Safe judgement during ADL, Decreased endurance, Decreased self-care trans, Decreased high-level ADLs(decreased balance)  Prognosis: Good  Assessment: Patient seen for occupational therapy treatment today  Patient is able to complete ADLs and functional transfers at a supervision to independent level  Patient has DME for bathroom already at home (shower chair) and educated on safe transfer technique  Patient tolerated OT session well  At end of session seated in chair with all needs met  Recommend home with family support upon discharge   Continue OT per POC     OT Discharge Recommendation: Home with family support

## 2020-01-22 NOTE — PROGRESS NOTES
Progress Note - Orthopedics   Leland Worrell 64 y o  female MRN: [id number]  Unit/Bed#: [address] [place] [address] Encounter: [id number]    Assessment:  1) POD#2 s/p R TKA    Plan:  Ancef 2 g IV x 2 for 24 hours postop - completed  DVT prophylaxis: ASA 325mg PO BID/SCD's/Ambulation  PT/OT- WBAT RLE  Analgesia PRN - rely on oral meds  Follow up AM labs - H/H 9 7/29 5, episodes of dizziness resolved, continue to monitor for symptoms, asymptomatic of anemia  Dillard DC - monitor for continued proper void  Dressing- monitor for drainage, Mepilex may remain in place for 7 days  Discharge planning - discharge today to begin outpatient PT pending progress with PT postoperatively  Weight bearing: WBAT    VTE Pharmacologic Prophylaxis: ASA 325mg PO BID  VTE Mechanical Prophylaxis: sequential compression device    Subjective:  Patient seen and examined at bedside  No overnight events  She received 1 L bolus fluid yesterday a scopolamine patch was removed  She did not any significant episodes of lightheadedness or dizziness  She was able to work with physical therapy and made significant progress  Her pain is controlled  She denies any chest pain, shortness of breath, paresthesias, or other symptoms  Vitals: Blood pressure 161/85, pulse 78, temperature 98 7 °F (37 1 °C), temperature source Oral, resp  rate 18, height 4' 11" (1 499 m), weight 75 4 kg (166 lb 3 6 oz), last menstrual period 09/01/2010, SpO2 96 %, not currently breastfeeding  ,Body mass index is 33 57 kg/m²        Intake/Output Summary (Last 24 hours) at 1/22/2020 0853  Last data filed at 1/21/2020 1301  Gross per 24 hour   Intake 1000 ml   Output 1450 ml   Net -450 ml       Invasive Devices     Peripheral Intravenous Line            Peripheral IV 01/20/20 Left Wrist 1 day                Physical Exam: NAD, A&Ox3, sitting comfortably in chair  Ortho Exam: RLE: right anterior knee dsg c/d/i, compartments soft, calf non-tender, +PF/DF/EHL, +DP/SP/Saph/Sural SILT, DP 2+, foot warm, TEDs/foot pumps in place    Lab, Imaging and other studies:   Lab Results   Component Value Date    WBC 7 70 01/22/2020    HGB 9 7 (L) 01/22/2020    HCT 29 5 (L) 01/22/2020    MCV 93 01/22/2020     01/22/2020     Lab Results   Component Value Date    SODIUM 141 01/22/2020    K 4 1 01/22/2020     01/22/2020    CO2 31 01/22/2020    AGAP 6 01/22/2020    BUN 9 01/22/2020    CREATININE 0 72 01/22/2020    GLUC 111 01/22/2020    GLUF 97 12/26/2019    CALCIUM 8 7 01/22/2020    AST 15 12/26/2019    ALT 18 12/26/2019    ALKPHOS 98 12/26/2019    TP 8 1 12/26/2019    TBILI 0 40 12/26/2019    EGFR 93 01/22/2020     Arnie Alvarado PA-C

## 2020-01-23 ENCOUNTER — TELEPHONE (OUTPATIENT)
Dept: OBGYN CLINIC | Facility: HOSPITAL | Age: 60
End: 2020-01-23

## 2020-01-23 ENCOUNTER — APPOINTMENT (OUTPATIENT)
Dept: PHYSICAL THERAPY | Age: 60
End: 2020-01-23
Payer: COMMERCIAL

## 2020-01-23 ENCOUNTER — TRANSITIONAL CARE MANAGEMENT (OUTPATIENT)
Dept: FAMILY MEDICINE CLINIC | Facility: CLINIC | Age: 60
End: 2020-01-23

## 2020-01-23 NOTE — TELEPHONE ENCOUNTER
Pt contacted today to complete a postoperative follow up call assessment  Pt reports being at home and doing "pretty well "    Pt's AVS and AVS medication list were reviewed  Pt reports taking Tylenol 1000mg TID and Celebrex 100mg BID for pain control  Pt denies yet having to take the Oxycodone yet and plans to today before her first outpatient PT session  Pt reports taking ASA 325mg BID as prescribed, and colace BID  Pt denies yet having a Bowel movement, but does report passing gas  Pt denies any nausea, vomiting or abdominal pain  Pt advised to increase fluid, fiber and mobility to assist with BM goal     Pt reports current 2 out of 10 pain at this time  Pt reports noticing "a little" swelling to the knee yesterday but that it is "calming down "  Pt reports she has not been icing and was advised to ice 20 minutes on every 2-3 hours  Pt reports dressing is dry and denies any drainage  Pt reports dressing is "clean and neat "    Pt reports ambulating with the walker and doing "fantastic, getting along well " Pt reports she is "very determined" to succeed and willing to do the work  Pt has outpatient PT today and was anxious to get it going  Pt denies any chest pain, SOB, dizziness, fever, calf pain or any other addtl symptoms  Pt aware of f/u with surgeon on 2/6 and PCP on 1/31  Pt advised to contact our team with any issues or concerns

## 2020-01-24 ENCOUNTER — EVALUATION (OUTPATIENT)
Dept: PHYSICAL THERAPY | Age: 60
End: 2020-01-24
Payer: COMMERCIAL

## 2020-01-24 ENCOUNTER — TELEPHONE (OUTPATIENT)
Dept: OBGYN CLINIC | Facility: CLINIC | Age: 60
End: 2020-01-24

## 2020-01-24 DIAGNOSIS — M17.11 PRIMARY OSTEOARTHRITIS OF RIGHT KNEE: Primary | ICD-10-CM

## 2020-01-24 DIAGNOSIS — Z47.1 AFTERCARE FOLLOWING RIGHT KNEE JOINT REPLACEMENT SURGERY: ICD-10-CM

## 2020-01-24 DIAGNOSIS — Z96.651 AFTERCARE FOLLOWING RIGHT KNEE JOINT REPLACEMENT SURGERY: ICD-10-CM

## 2020-01-24 DIAGNOSIS — Z96.651 S/P TKR (TOTAL KNEE REPLACEMENT) USING CEMENT, RIGHT: ICD-10-CM

## 2020-01-24 DIAGNOSIS — Z96.651 STATUS POST TOTAL RIGHT KNEE REPLACEMENT USING CEMENT: Primary | ICD-10-CM

## 2020-01-24 PROCEDURE — 97110 THERAPEUTIC EXERCISES: CPT | Performed by: PHYSICAL THERAPIST

## 2020-01-24 PROCEDURE — 97164 PT RE-EVAL EST PLAN CARE: CPT | Performed by: PHYSICAL THERAPIST

## 2020-01-24 RX ORDER — TRAMADOL HYDROCHLORIDE 50 MG/1
TABLET ORAL
Qty: 48 TABLET | Refills: 0 | Status: SHIPPED | OUTPATIENT
Start: 2020-01-24 | End: 2020-02-28 | Stop reason: ALTCHOICE

## 2020-01-24 NOTE — TELEPHONE ENCOUNTER
Patient called c/o consistent nausea reltated to oxycodne  Wanted to know if we would be able to try something else  Patient also questioned why she was not given antibiotics to take once d/c advised that Dr Dillon Ramachandran does not give them post op except for dental/gi procedure

## 2020-01-24 NOTE — PROGRESS NOTES
PT Re-Evaluation     Today's date: 2020  Patient name: Freda Corey  : 1960  MRN: [id number]  Referring provider: Kayy Johnson DO  Dx:   Encounter Diagnosis     ICD-10-CM    1  Primary osteoarthritis of right knee M17 11    2  S/P TKR (total knee replacement) using cement, right Z96 651                   Assessment  Assessment details: Freda Corey is a 64 y o  female who presents with pain, decreased strength, decreased ROM, decreased joint mobility, joint effusion and ambulatory dysfunction  Due to these impairments, Patient has difficulty performing a/iadls, recreational activities, work-related activities and engaging in social activities  Patient's clinical presentation is consistent with their referring diagnosis of s/p right knee replacement  Patient would benefit from skilled physical therapy to address their aforementioned impairments, improve their level of function and to improve their overall quality of life  Impairments: abnormal gait, abnormal or restricted ROM, activity intolerance, impaired balance, impaired physical strength, lacks appropriate home exercise program, pain with function, weight-bearing intolerance and poor posture   Understanding of Dx/Px/POC: good   Prognosis: good    Goals  ST-3 WEEKS  1  Decrease pain by 2 points on VAS at its worst   2   Increase ROM 0-90  3  Increase LE by 1/2 MMT grade in all deficient planes  LT-8 WEEKS  1  Patient to be independent with a/iadls  2  Ambulation with/without appropriate assistive device for functional distances in home and in the community  3  Independent with HEP ongoing throughout POC  4  AROM 0- 115 or greater    5  Pain <3/10 at its worst     Plan  Patient would benefit from: skilled physical therapy  Planned modality interventions: cryotherapy and thermotherapy: hydrocollator packs  Planned therapy interventions: activity modification, aquatic therapy, home exercise program, IADL retraining, joint mobilization, manual therapy, neuromuscular re-education, patient education, strengthening, stretching, therapeutic activities and therapeutic exercise  Frequency: 2-3x week  Duration in weeks: 8  Plan of Care beginning date: 2020  Plan of Care expiration date: 2020  Treatment plan discussed with: patient        Subjective Evaluation    History of Present Illness  Date of onset: 2020  Date of surgery: 2020  Mechanism of injury: Right knee total knee replacement  Stayed in hospital an extra night due to fluctuating blood pressure  Attempted to attend out patient PT yesterday at this facility but unable due to nausea so rescheduled for today  Surgery 20 return to home 20  Quality of life: good    Pain  Current pain ratin  At best pain ratin  At worst pain ratin  Location: general right knee pain    Quality: burning, throbbing, tight and dull ache  Relieving factors: ice, medications and rest  Aggravating factors: standing, walking and sitting  Progression: no change    Social Support  Stairs in house: yes (5)   Lives in: multiple-level home  Lives with: spouse and adult children    Hand dominance: right    Patient Goals  Patient goals for therapy: decreased pain, decreased edema, increased motion, increased strength and independence with ADLs/IADLs  Patient goal: walk without walker; heal        Objective     Observations     Additional Observation Details  Right knee with post surgical dressing that is intact and not to be removed for one week    Neurological Testing     Sensation     Knee   Left Knee   Intact: light touch    Right Knee   Intact: light touch     Active Range of Motion   Left Knee   Flexion: 136 degrees   Extension: 0 degrees     Right Knee   Flexion: 65 degrees   Extension: -8 degrees     Passive Range of Motion   Left Knee   Normal passive range of motion    Right Knee   Flexion: 70 degrees   Extension: -6 degrees     Strength/Myotome Testing Left Knee   Normal strength    Right Knee   Flexion: 3  Extension: 3-    Ambulation   Weight-Bearing Status   Weight-Bearing Status (Right): weight-bearing as tolerated    Assistive device used: rollator walker    Ambulation: Level Surfaces   Ambulation with assistive device: independent  Ambulation without assistive device: unable    Ambulation: Stairs   Ascend stairs: contact guard assist (with crutch)  Pattern: non-reciprocal  Railings: two rails  Descend stairs: contact guard assist  Pattern: non-reciprocal  Railings: one rail    Functional Assessment        Comments  Shower seat and leg protector  Walker for ambulation  No work  No drive  Assist with IADLs      General Comments:      Knee Comments  Girth not measured this date due to post surgical dressing and clothing restriction      Flowsheet Rows      Most Recent Value   PT/OT G-Codes   Current Score  39   Projected Score  58             Precautions: L partial knee replacement, LBP      Manual  1/24            R LE/knee                                                                   HEP: quad set, heels slides, standing hip extensin, flexion, abduction, toes raises, LAQ  Exercise Diary  1/24            Quad set 5"/20x            Ball heel slides 20x            SLR nt            Ball add set 10x            TB ABD nt            SAQ nt                         LAQ 10x A            Standing hip extension 10x            Hip abd 7x            Hip flexion bent knee 10x            Calf raises 10x                         slant nv            Biodex nv                                                                                 Modalities  1/24            Ice post 10

## 2020-01-24 NOTE — UTILIZATION REVIEW
Notification of Discharge  This is a Notification of Discharge from our facility [address]  Please be advised that this patient has been discharge from our facility  Below you will find the admission and discharge date and time including the patients disposition  PRESENTATION DATE: 1/20/2020  8:18 AM  OBS ADMISSION DATE:   IP ADMISSION DATE: 1/21/20 1225   DISCHARGE DATE: 1/22/2020  6:16 PM  DISPOSITION: Home/Self Care Home/Self Care   Admission Orders listed below:  Admission Orders (From admission, onward)     Ordered        01/21/20 1225  Inpatient Admission  Once                   Please contact the UR Department if additional information is required to close this patient's authorization/case  Nini Jean  [facility] Utilization Review Department  Main: [phone] x carefully listen to the prompts  All voicemails are confidential   [email or web address]  Send all requests for admission clinical reviews, approved or denied determinations and any other requests to dedicated fax number below belonging to the campus where the patient is receiving treatment   List of dedicated fax numbers:  [address] DENIALS (Administrative/Medical Necessity) [phone]   [address] (Maternity/NICU/Pediatrics) [phone]   José Miguel Roblero [phone]   [facility] [phone]   [address] [place] [address] [phone]   [address] [phone]   [facility]  [phone]   [address] [facility] [address]  [address] [phone]

## 2020-01-24 NOTE — TELEPHONE ENCOUNTER
I sent a prescription for tramadol 50 mg 1-2 tabs p o  Q 6 hours as needed for pain to her pharmacy  She should discontinue using the oxycodone  In the early postoperative    She will need more than the anti-inflammatories and Tylenol she is suggested here in the early postoperative period

## 2020-01-27 ENCOUNTER — TELEPHONE (OUTPATIENT)
Dept: OBGYN CLINIC | Facility: HOSPITAL | Age: 60
End: 2020-01-27

## 2020-01-27 ENCOUNTER — OFFICE VISIT (OUTPATIENT)
Dept: PHYSICAL THERAPY | Age: 60
End: 2020-01-27
Payer: COMMERCIAL

## 2020-01-27 DIAGNOSIS — M25.562 CHRONIC PAIN OF LEFT KNEE: ICD-10-CM

## 2020-01-27 DIAGNOSIS — M17.11 PRIMARY OSTEOARTHRITIS OF RIGHT KNEE: Primary | ICD-10-CM

## 2020-01-27 DIAGNOSIS — G89.29 CHRONIC PAIN OF LEFT KNEE: ICD-10-CM

## 2020-01-27 DIAGNOSIS — Z96.651 S/P TKR (TOTAL KNEE REPLACEMENT) USING CEMENT, RIGHT: ICD-10-CM

## 2020-01-27 PROCEDURE — 97140 MANUAL THERAPY 1/> REGIONS: CPT | Performed by: PHYSICAL THERAPIST

## 2020-01-27 PROCEDURE — 97110 THERAPEUTIC EXERCISES: CPT | Performed by: PHYSICAL THERAPIST

## 2020-01-27 NOTE — TELEPHONE ENCOUNTER
Patient contacted today after a received notification via care since that she had postoperative questions  Patient reports that she is taking the tramadol that was sent into the pharmacy last week and reports that the 50 mg of tramadol is controlling her pain at a 1 or 2/10  Patient reports she has since stopped the Tylenol as the Tylenol was making her nauseous  Patient reports only taking the tramadol for pain control at this time but is pleased with how she is responding to it  Patient reports that she does have bruising and wanted to know if that was normal   Patient does report that the bruising is starting to turn colors to the greenish yellow and is not any worse a purple/pinkish  Patient denies any ecchymosis that is spreading or new  Patient denies any redness, warmth, or tenderness to these areas  Patient also reports having a scratchy" throat but denies any symptoms  Patient was advised to continue to monitor for any congestion, cough, fever, or other additional head cold symptoms  Patient advised if any of those developed to contact her PCP  Patient denied any other questions at this time and was advised to contact me if any arise

## 2020-01-27 NOTE — PROGRESS NOTES
Daily Note     Today's date: 2020  Patient name: Derick Joaquin  : 1960  MRN: [id number]  Referring provider: Georgie Mercedes DO  Dx:   Encounter Diagnosis     ICD-10-CM    1  Primary osteoarthritis of right knee M17 11    2  S/P TKR (total knee replacement) using cement, right Z96 651    3  Chronic pain of left knee M25 562     G89 29                   Subjective: Patient reports her pain medication is still not regulated and she is still getting nauseous  Patient states she was doing good this am and walking around and doing well  Objective: See treatment diary below      Assessment: Tolerated treatment poor  Patient would benefit from continued PT Patient declined to do more today due to pain and nausea  Slight increase in PROM right knee flexion  Significant bruising posterior RLE  Patient states groin area is also bruised  Moderate swelling in RLE as well  Advised ice and elevation  Reviewed HEP with patient  Patient using RW with slow gait speed but able to put 75% on RLE  Patient states she is trying hard and is motivated  Plan: Continue per plan of care        Precautions: L partial knee replacement, LBP      Manual             R LE/knee  10                                     PROM k' flexion  70                          HEP: quad set, heels slides, standing hip extensin, flexion, abduction, toes raises, LAQ  Exercise Diary             Quad set 5"/20x            Ball heel slides 20x 30x           SLR nt 2x10wA           Ball add set 10x 20x           TB ABD nt 20x           SAQ nt nt                        LAQ 10x A 10x w/A           Standing hip extension 10x home           Hip abd 7x home           Hip flexion bent knee 10x home           Calf raises 10x home           Heel slides sitting  5x           slant nv nt           Biodex nv nt                                                                                Modalities             Ice post 10 home

## 2020-01-28 ENCOUNTER — TELEPHONE (OUTPATIENT)
Dept: OBGYN CLINIC | Facility: HOSPITAL | Age: 60
End: 2020-01-28

## 2020-01-28 NOTE — TELEPHONE ENCOUNTER
Patient contacted me this morning questioning her dressing instructions  I did reference her AVS and read her the following instructions taking right from the AVS: "Wound Care: Please leave the Mepilex dressing in place for 7 days after surgery  After 7 days, you may remove  the dressing and leave the incision open to air  Do not get the dressing or the incision wet until your seen in the  office  If the incision is uncomfortable under clothing after the Mepilex is removed, you may cover it with a a dry  sterile dressing, but should remain dry at all times "    Patient was agreeable and understanding to follow his recommendations    Patient had no other questions at this time

## 2020-01-29 ENCOUNTER — TELEPHONE (OUTPATIENT)
Dept: OBGYN CLINIC | Facility: HOSPITAL | Age: 60
End: 2020-01-29

## 2020-01-29 ENCOUNTER — APPOINTMENT (OUTPATIENT)
Dept: PHYSICAL THERAPY | Age: 60
End: 2020-01-29
Payer: COMMERCIAL

## 2020-01-29 NOTE — PROGRESS NOTES
Daily Note     Today's date: 2020  Patient name: Osvaldo Peoples  : 1960  MRN: [id number]  Referring provider: Marilee Garrido DO  Dx:   Encounter Diagnosis     ICD-10-CM    1  Primary osteoarthritis of right knee M17 11    2  S/P TKR (total knee replacement) using cement, right Z96 651                   Subjective: ***      Objective: See treatment diary below      Assessment: Tolerated treatment {Tolerated treatment :[id number]}  Patient {assessment:[id number]}      Plan: Continue per plan of care        Precautions: L partial knee replacement, LBP      Manual             R LE/knee  10                                     PROM k' flexion  70                          HEP: quad set, heels slides, standing hip extensin, flexion, abduction, toes raises, LAQ  Exercise Diary             Quad set 5"/20x            Ball heel slides 20x 30x           SLR nt 2x10wA           Ball add set 10x 20x           TB ABD nt 20x           SAQ nt nt                        LAQ 10x A 10x w/A           Standing hip extension 10x home           Hip abd 7x home           Hip flexion bent knee 10x home           Calf raises 10x home           Heel slides sitting  5x           slant nv nt           Biodex nv nt                                                                                Modalities             Ice post 10 home

## 2020-01-29 NOTE — TELEPHONE ENCOUNTER
Patient contacted me this morning reporting that she is struggling with tolerating her pain medication, tramadol 50 mg  Patient reports that she has been taking it since Monday as prescribed and her pain has been controlled, but that everything gag her, and is very nauseous   Patient reports that she is unable to eat and only was able to tolerate about the amount of breakfast all day yesterday    Patient reports that she has been taking Zofran prescribed to her but only twice a day, per AVS by provider it was recommended to be taken every 8 hours, patient was educated to increase Zofran administration at this time to every 8 hours as listed on AVS     Patient aware I will notify surgeon and seek any other advice for patient

## 2020-01-29 NOTE — TELEPHONE ENCOUNTER
Patient contacted and relate message from surgeon below  Patient reports that as of my call with her this morning when I recommended taking the Zofran every 8 hours as prescribed, patient reports being able to have tolerated a small breakfast and small lunch  Patient aware that she is going to continue try taking Zofran as prescribed every 8 hours and see if she can tolerate the tramadol if not she was advised to take Tylenol 650 mg, 2 tablets PO, 3 times a day for pain  Patient had no other questions at this time

## 2020-01-29 NOTE — TELEPHONE ENCOUNTER
Thank you for the update  I agree with the increased Zofran use as directed  She has not demonstrated intolerance to various narcotic and narcotic like pain management strategies postoperatively due to gastrointestinal sequela  I recommend discontinuation of tramadol if it continues to cause nausea on the new Zofran dosing  If tramadol is discontinued she may use Tylenol 650mg, 2 tabs PO TID for pain  Thank you for your help Malissa

## 2020-01-31 ENCOUNTER — OFFICE VISIT (OUTPATIENT)
Dept: FAMILY MEDICINE CLINIC | Facility: CLINIC | Age: 60
End: 2020-01-31
Payer: COMMERCIAL

## 2020-01-31 VITALS
SYSTOLIC BLOOD PRESSURE: 124 MMHG | OXYGEN SATURATION: 99 % | BODY MASS INDEX: 33.83 KG/M2 | WEIGHT: 167.8 LBS | HEART RATE: 85 BPM | DIASTOLIC BLOOD PRESSURE: 78 MMHG | HEIGHT: 59 IN | TEMPERATURE: 97.6 F

## 2020-01-31 DIAGNOSIS — K21.9 GASTROESOPHAGEAL REFLUX DISEASE, ESOPHAGITIS PRESENCE NOT SPECIFIED: ICD-10-CM

## 2020-01-31 DIAGNOSIS — Z96.651 S/P TKR (TOTAL KNEE REPLACEMENT) USING CEMENT, RIGHT: ICD-10-CM

## 2020-01-31 DIAGNOSIS — Z09 HOSPITAL DISCHARGE FOLLOW-UP: ICD-10-CM

## 2020-01-31 DIAGNOSIS — M17.11 PRIMARY OSTEOARTHRITIS OF RIGHT KNEE: Primary | ICD-10-CM

## 2020-01-31 DIAGNOSIS — R11.0 NAUSEA: ICD-10-CM

## 2020-01-31 PROBLEM — M25.561 CHRONIC PAIN OF RIGHT KNEE: Status: RESOLVED | Noted: 2019-11-26 | Resolved: 2020-01-31

## 2020-01-31 PROBLEM — Z01.818 PRE-OP EXAMINATION: Status: RESOLVED | Noted: 2020-01-02 | Resolved: 2020-01-31

## 2020-01-31 PROBLEM — R94.31 ABNORMAL EKG: Status: RESOLVED | Noted: 2019-12-31 | Resolved: 2020-01-31

## 2020-01-31 PROBLEM — G89.29 CHRONIC PAIN OF RIGHT KNEE: Status: RESOLVED | Noted: 2019-11-26 | Resolved: 2020-01-31

## 2020-01-31 PROBLEM — Z01.810 PRE-OPERATIVE CARDIOVASCULAR EXAMINATION: Status: RESOLVED | Noted: 2019-04-15 | Resolved: 2020-01-31

## 2020-01-31 PROCEDURE — 1111F DSCHRG MED/CURRENT MED MERGE: CPT | Performed by: FAMILY MEDICINE

## 2020-01-31 PROCEDURE — 99495 TRANSJ CARE MGMT MOD F2F 14D: CPT | Performed by: FAMILY MEDICINE

## 2020-01-31 RX ORDER — OMEPRAZOLE 20 MG/1
20 CAPSULE, DELAYED RELEASE ORAL DAILY
COMMUNITY
End: 2021-04-27 | Stop reason: ALTCHOICE

## 2020-01-31 NOTE — PROGRESS NOTES
Assessment/Plan:        Problem List Items Addressed This Visit        Digestive    Gastroesophageal reflux disease     Advised to start Prilosec 20 mg daily  She has some home  Call if symptoms do not improve         Relevant Medications    omeprazole (PriLOSEC) 20 mg delayed release capsule       Musculoskeletal and Integument    Primary osteoarthritis of right knee - Primary       Other    S/P TKR (total knee replacement) using cement, right     Patient is doing well after right total knee replacement  She will continue physical therapy  Follow-up with orthopedics  Nausea     Patient will continue to use Zofran p r n  Other Visit Diagnoses     Hospital discharge follow-up               Subjectiv     Patient ID: Rita Hernandez is a 64 y o  female  Patient underwent R total knee arthroplasty with Dr Irma Crowley  She is here for hospital follow up  The surgery was 11 days ago  She did well postoperatively  She is having some trouble with nausea from the pain medication  She takes Tramadol, but it makes her nauseated if not taken with food  She takes Zofran which helps  She is having some constipation which she uses Colace and Fleets enema  She is doing PT  She reports occasionlly feels a "lump" in her throat  She has heartburn- epigastric burning  No current treatment  Review of Systems   Constitutional: Negative for activity change  Respiratory: Negative for chest tightness and shortness of breath  Cardiovascular: Negative for chest pain and leg swelling  Gastrointestinal: Positive for nausea  Heartburn   Musculoskeletal: Positive for arthralgias and gait problem  Neurological: Negative for headaches  Psychiatric/Behavioral: Negative for dysphoric mood  The patient is not nervous/anxious  Objective:     Physical Exam   Constitutional: She is oriented to person, place, and time  She appears well-developed and well-nourished  No distress     HENT:   Head: Normocephalic and atraumatic  Mouth/Throat: Oropharynx is clear and moist and mucous membranes are normal    Eyes: Pupils are equal, round, and reactive to light  Conjunctivae are normal    Neck: Neck supple  Cardiovascular: Normal rate, regular rhythm and normal heart sounds  Exam reveals no gallop and no friction rub  No murmur heard  Pulmonary/Chest: Effort normal and breath sounds normal  No respiratory distress  She has no wheezes  She has no rales  She exhibits no tenderness  Musculoskeletal: She exhibits no edema  Right knee: She exhibits decreased range of motion and swelling  Legs:  Neurological: She is alert and oriented to person, place, and time  Gait (Using a walker to ambulate) abnormal    Skin: Skin is warm and dry  No rash noted  She is not diaphoretic  Psychiatric: She has a normal mood and affect  Her behavior is normal  Judgment and thought content normal    Nursing note and vitals reviewed  Vitals:    01/31/20 1603   BP: 124/78   Pulse: 85   Temp: 97 6 °F (36 4 °C)   SpO2: 99%   Weight: 76 1 kg (167 lb 12 8 oz)   Height: 4' 11" (1 499 m)       Transitional Care Management Review:  Jovanny Quach is a 64 y o  female here for TCM follow up  During the TCM phone call patient stated:    TCM Call (since 12/31/2019)     Date and time call was made  1/23/2020  1:47 PM    Hospital care reviewed  Records reviewed    Patient was hospitialized at  [address] [place] [address]    Date of Admission  01/20/20    Date of discharge  01/22/20    Diagnosis  S/P TKR (total knee replacement) using cement, right    Disposition  Home    Were the patients medications reviewed and updated  No    Current Symptoms  None      TCM Call (since 12/31/2019)     Post hospital issues  Reduced activity    Should patient be enrolled in anticoag monitoring? No    Scheduled for follow up?   Yes    Did you obtain your prescribed medications  Yes    Do you need help managing your prescriptions or medications No    Is transportation to your appointment needed  No    I have advised the patient to call PCP with any new or worsening symptoms  Mary Obando MD

## 2020-01-31 NOTE — ASSESSMENT & PLAN NOTE
Patient is doing well after right total knee replacement  She will continue physical therapy  Follow-up with orthopedics

## 2020-02-06 ENCOUNTER — OFFICE VISIT (OUTPATIENT)
Dept: OBGYN CLINIC | Facility: CLINIC | Age: 60
End: 2020-02-06

## 2020-02-06 ENCOUNTER — APPOINTMENT (OUTPATIENT)
Dept: RADIOLOGY | Facility: CLINIC | Age: 60
End: 2020-02-06
Payer: COMMERCIAL

## 2020-02-06 VITALS
DIASTOLIC BLOOD PRESSURE: 85 MMHG | WEIGHT: 168 LBS | BODY MASS INDEX: 33.87 KG/M2 | SYSTOLIC BLOOD PRESSURE: 126 MMHG | HEIGHT: 59 IN | HEART RATE: 78 BPM

## 2020-02-06 DIAGNOSIS — Z96.651 S/P TKR (TOTAL KNEE REPLACEMENT) USING CEMENT, RIGHT: ICD-10-CM

## 2020-02-06 DIAGNOSIS — Z96.651 S/P TKR (TOTAL KNEE REPLACEMENT) USING CEMENT, RIGHT: Primary | ICD-10-CM

## 2020-02-06 DIAGNOSIS — Z47.1 AFTERCARE FOLLOWING RIGHT KNEE JOINT REPLACEMENT SURGERY: ICD-10-CM

## 2020-02-06 DIAGNOSIS — Z96.651 AFTERCARE FOLLOWING RIGHT KNEE JOINT REPLACEMENT SURGERY: ICD-10-CM

## 2020-02-06 PROCEDURE — 73562 X-RAY EXAM OF KNEE 3: CPT

## 2020-02-06 PROCEDURE — 99024 POSTOP FOLLOW-UP VISIT: CPT | Performed by: ORTHOPAEDIC SURGERY

## 2020-02-06 PROCEDURE — 1111F DSCHRG MED/CURRENT MED MERGE: CPT | Performed by: ORTHOPAEDIC SURGERY

## 2020-02-06 PROCEDURE — 3008F BODY MASS INDEX DOCD: CPT | Performed by: ORTHOPAEDIC SURGERY

## 2020-02-06 PROCEDURE — 3074F SYST BP LT 130 MM HG: CPT | Performed by: ORTHOPAEDIC SURGERY

## 2020-02-06 PROCEDURE — 3079F DIAST BP 80-89 MM HG: CPT | Performed by: ORTHOPAEDIC SURGERY

## 2020-02-06 NOTE — PROGRESS NOTES
Assessment/Plan:  1  S/P TKR (total knee replacement) using cement, right  XR knee 3 vw right non injury   2  Aftercare following right knee joint replacement surgery       Scribe Attestation    I,:   Aravind Jin am acting as a scribe while in the presence of the attending physician :        I,:   Mike Lewis, DO personally performed the services described in this documentation    as scribed in my presence :          Brigitte Aranda is a pleasant 51-year-old female who returns today for follow-up evaluation two weeks status post right total knee arthroplasty  We had a discussion today about the importance of compliance with her postoperative plan of care including participation in formal physical therapy and DVT prophylaxis  I explained that she must reinitiate the use of aspirin for DVT prophylaxis immediately  I do not have concern for a DVT based on her clinical exam today in the office, however I did stress the importance complying with DVT prophylaxis  We also spent time discussing an appropriate medication regimen  She will use aspirin in the morning and at night for DVT prophylaxis  I also explained that she may use two tabs of Aleve in the morning and at night and may supplement this with 650 mg Tylenol up to 3 times per day  I did provide written instructions for this medication regimen for her to avoid confusion  I also stressed the importance of returning to formal physical therapy as she does demonstrate decreased knee flexion on exam today  I recommended that she seek care with a new therapist with him she feels more comfortable  I did also demonstrate chair exercises for passive range of motion  She does plan on initiating these exercises today  She did verbalize understanding of all aspects of our discussion today as well as with her plan of care moving forward  She also understands that she should call the office directly and immediately with any questions    I would like to see her back in two weeks for range-of-motion check  Subjective: Follow up evaluation 2 weeks status post right total knee arthroplasty    Patient ID: Theresa Grullon is a 64 y o  female  HPI  Nicki Monroe is a pleasant 72-year-old female who returns today for follow-up evaluation two weeks status post right total knee arthroplasty  She does report struggling with nausea and dizziness following her procedure  She attributes this to the narcotic medication regimen prescribed postoperatively  Due to this, she states that she transitioned herself from the narcotic medications to Aleve  She does state that in addition to the Aleve, she has also continued to use her Celebrex  She did discontinue her use of aspirin as she had concern about mixing NSAID medications  She does also report having difficulty with formal physical therapy  She states that she has attended two sessions but has not been back as she found the exercises elicited severe back pain and nausea  She did attempt to communicate this to her therapist but says that she felt ignored and was not comfortable returning  She does state that despite her issues with nausea, her pain has been well controlled  She denies any acute injury or trauma  She denies any distal paresthesias of the lower extremity  She denies any mechanical symptoms about the knee  She denies any calf tenderness  She denies any fever, chills, sweats or other symptoms of infection  Review of Systems   Constitutional: Positive for activity change  Negative for chills, fever and unexpected weight change  HENT: Negative for hearing loss, nosebleeds and sore throat  Eyes: Negative for pain, redness and visual disturbance  Respiratory: Negative for cough, shortness of breath and wheezing  Cardiovascular: Negative for chest pain, palpitations and leg swelling  Gastrointestinal: Positive for nausea  Negative for abdominal pain and vomiting     Endocrine: Negative for polydipsia and polyuria  Genitourinary: Negative for dysuria and hematuria  Musculoskeletal: Negative for arthralgias, joint swelling and myalgias  See HPI   Skin: Negative for rash and wound  Neurological: Positive for dizziness  Negative for numbness and headaches  Psychiatric/Behavioral: Negative for decreased concentration and suicidal ideas  The patient is not nervous/anxious  Past Medical History:   Diagnosis Date    Seasonal allergies     Sinus congestion     developed nasal congestion and cough 19- is going to call PCP today for RX; advised to call surgeon if not better next week      Skin lesion of left lower extremity 2019    Torn medial meniscus     2016  last assessed       Past Surgical History:   Procedure Laterality Date     SECTION      x2    COLONOSCOPY      KNEE ARTHROSCOPY, MEDIAL PATELLO FEMORAL LIGAMENT REPAIR      patellar closed    KNEE SURGERY      with medial meniscus repair    IA TOTAL KNEE ARTHROPLASTY Right 2020    Procedure: ARTHROPLASTY KNEE TOTAL;  Surgeon: Marilyn Batista DO;  Location: [facility];  Service: Orthopedics    REPLACEMENT TOTAL KNEE         Family History   Problem Relation Age of Onset    Hypertension Mother     No Known Problems Father     Colon cancer Maternal Grandfather [de-identified]    No Known Problems Daughter     No Known Problems Maternal Grandmother     No Known Problems Paternal Grandmother     Pancreatic cancer Maternal Aunt 76    No Known Problems Daughter     No Known Problems Paternal Aunt     Breast cancer Neg Hx        Social History     Occupational History    Not on file   Tobacco Use    Smoking status: Never Smoker    Smokeless tobacco: Never Used   Substance and Sexual Activity    Alcohol use: Yes     Frequency: Monthly or less     Drinks per session: 1 or 2     Binge frequency: Never     Comment: social drinker in all scripts    Drug use: No    Sexual activity: Not on file         Current Outpatient Medications:     alendronate (FOSAMAX) 70 mg tablet, Take 1 tablet (70 mg total) by mouth every 7 days, Disp: 4 tablet, Rfl: 5    celecoxib (CeleBREX) 100 mg capsule, Take 1 capsule (100 mg total) by mouth 2 (two) times a day, Disp: 28 capsule, Rfl: 0    docusate sodium (COLACE) 100 mg capsule, Take 1 capsule (100 mg total) by mouth 2 (two) times a day, Disp: 60 capsule, Rfl: 0    estradiol (ESTRACE) 0 1 mg/g vaginal cream, Insert 1 g into the vagina 3 (three) times a week, Disp: 42 5 g, Rfl: 3    fluticasone (FLONASE) 50 mcg/act nasal spray, 1 spray into each nostril 2 (two) times a day, Disp: , Rfl:     ondansetron (ZOFRAN) 4 mg tablet, Take 1 tablet (4 mg total) by mouth every 8 (eight) hours as needed for nausea or vomiting, Disp: 20 tablet, Rfl: 0    traMADol (ULTRAM) 50 mg tablet, 1-2 tabs every six hours as needed for pain, Disp: 48 tablet, Rfl: 0    acetaminophen (TYLENOL) 325 mg tablet, Take 3 tablets (975 mg total) by mouth every 8 (eight) hours (Patient not taking: Reported on 2/6/2020), Disp: 30 tablet, Rfl: 0    aspirin 325 mg tablet, Take 1 tablet (325 mg total) by mouth 2 (two) times a day (Patient not taking: Reported on 2/6/2020), Disp: 84 tablet, Rfl: 0    omeprazole (PriLOSEC) 20 mg delayed release capsule, Take 20 mg by mouth daily, Disp: , Rfl:     No Known Allergies    Objective:  Vitals:    02/06/20 1554   BP: 126/85   Pulse: 78       Body mass index is 33 93 kg/m²  Right Knee Exam     Tenderness   The patient is experiencing no tenderness  Range of Motion   Right knee extension: 0  Right knee flexion: 80       Tests   Varus: negative Valgus: negative  Drawer:  Anterior - negative    Posterior - negative    Other   Erythema: absent  Scars: present (nearly healed anterior operative incision that is clean, dry, in tact and shows no signs of failure)  Sensation: normal  Pulse: present  Swelling: mild  Effusion: no effusion present    Comments:  Ecchymosis about the posterior aspect of the distal lower leg as well as the proximal upper leg  Stable to varus and valgus stress throughout the available range of motion  Neurovascularly intact distally  No warmth, erythema or signs of infection  Calf is soft and non-tender          Observations     Right Knee   Negative for effusion  Physical Exam   Constitutional: She is oriented to person, place, and time  She appears well-developed and well-nourished  HENT:   Head: Normocephalic and atraumatic  Eyes: Pupils are equal, round, and reactive to light  Conjunctivae are normal    Neck: Normal range of motion  Neck supple  Cardiovascular: Normal rate and intact distal pulses  Pulmonary/Chest: Effort normal  No respiratory distress  Musculoskeletal:        Right knee: She exhibits no effusion  As noted in HPI   Neurological: She is alert and oriented to person, place, and time  Skin: Skin is warm and dry  Psychiatric: She has a normal mood and affect  Her behavior is normal    Nursing note and vitals reviewed  I have personally reviewed pertinent films in PACS  X-ray of the right knee obtained on 02/06/2020 demonstrates a well-positioned and well-aligned cemented total knee arthroplasty that shows no signs of failure  There is no acute fracture, dislocation, lytic or blastic lesion

## 2020-02-06 NOTE — PATIENT INSTRUCTIONS
Asprin 325mg twice per day  Aleve 2 tabs twice per day (morning and night)  Tylenol 650mg three times per day

## 2020-02-10 NOTE — PROGRESS NOTES
Received Telephone call from another facility stating patient will be attending PT there  Patient had called to cancel her appt's after her last session stating she was still nauseous and until she feels better she wanted to cancel

## 2020-02-11 ENCOUNTER — EVALUATION (OUTPATIENT)
Dept: PHYSICAL THERAPY | Facility: CLINIC | Age: 60
End: 2020-02-11
Payer: COMMERCIAL

## 2020-02-11 DIAGNOSIS — Z96.651 S/P TKR (TOTAL KNEE REPLACEMENT) USING CEMENT, RIGHT: Primary | ICD-10-CM

## 2020-02-11 DIAGNOSIS — M25.562 CHRONIC PAIN OF LEFT KNEE: ICD-10-CM

## 2020-02-11 DIAGNOSIS — M17.11 PRIMARY OSTEOARTHRITIS OF RIGHT KNEE: ICD-10-CM

## 2020-02-11 DIAGNOSIS — G89.29 CHRONIC PAIN OF LEFT KNEE: ICD-10-CM

## 2020-02-11 PROCEDURE — 97110 THERAPEUTIC EXERCISES: CPT | Performed by: PHYSICAL THERAPIST

## 2020-02-11 PROCEDURE — 97140 MANUAL THERAPY 1/> REGIONS: CPT | Performed by: PHYSICAL THERAPIST

## 2020-02-11 NOTE — PROGRESS NOTES
PT Re-Evaluation     Today's date: 2020  Patient name: Yash Welsh  : 1960  MRN: [id number]  Referring provider: Husam Fuller DO  Dx:   Encounter Diagnosis     ICD-10-CM    1  S/P TKR (total knee replacement) using cement, right Z96 651    2  Primary osteoarthritis of right knee M17 11    3  Chronic pain of left knee M25 562     G89 29                   Assessment  Assessment details: Yash Welsh is a pleasant 64 y o  presenting to physical therapy with MD referral for S/P TKR (total knee replacement) using cement, right  (primary encounter diagnosis), Primary osteoarthritis of right knee, and Chronic pain of left knee  Re-evaluation performed this date secondary to patient transferring from another location  Problem list:  Limited knee A/PROM, decreased hip/core strength, limited lower extremity flexibility, poor gait, and decreased functional balance  Treatment to include: Manual therapy techniques, lower extremity/core strengthening, neuromuscular control exercises, balance/proprioception training, gait training as needed, instruction in a comprehensive HEP, and modalities as needed  This pt would benefit from skilled PT services to address their impairments and functional limitations to maximize functional outcome  Barriers to therapy: Lower back pain, only attended 2 PT session since surgery on 20  Understanding of Dx/Px/POC: good   Prognosis: good    Goals  ST WEEKS  1  Decrease pain by 2 points on VAS at its worst  MET  2  Increase knee ROM 0-90 PARTIALLY MET  3  Increase LE by 1/2 MMT grade in all deficient planes  MET  4  Pt will be able to perform 10 SLR flexion without quad lag in 4 weeks  LT-8 WEEKS  1  Patient to be independent with a/iadls  PARTIALLY MET  2  Ambulation with/without appropriate assistive device for functional distances in home and in the community  PARTIALLY MET  3  Independent with HEP ongoing throughout POC MET  4   AROM 0- 115 or greater  NOT MET  5  Pain <3/10 at its worst  NOT MET    Plan  Patient would benefit from: skilled physical therapy  Frequency: 2x week (2-3x week)  Duration in weeks: 8  Treatment plan discussed with: patient        Subjective Evaluation    History of Present Illness  Date of onset: 2020  Date of surgery: 2020  Mechanism of injury: Right knee total knee replacement  Stayed in hospital an extra night due to fluctuating blood pressure  Attempted to attend out patient PT yesterday at this facility but unable due to nausea so rescheduled for today  Surgery 20 return to home 20  Pt attended PT for 2 sessions at another location and felt increase in sciatic nerve pain which was limiting her ability to perform the exercise with her knee  Pt is now changing location      Premorbid status:  - ADLs: Independent with moderate difficulty  - Work: Not a working individual- laid off in November  - Recreation: works on mini farm    Current status:  - ADLs/Functional activities:   - Stairs Step to pattern with Pain Levels: no pain at a slow pace   - Sit to stand with occasional mild pain after prolonged sitting with use of BUEs   - Walking household distances with SC with no pain   - Standing 60 minutes with no significant issues   - Sleeping with 2-3 nightly sleep disturbances due to pain   - Run/Bike/Jump  - Work: Currently laid off- (prolonged sitting)  - Recreation: none      Quality of life: good    Pain  Current pain ratin  At best pain ratin  At worst pain ratin  Location: medial aspect of right knee and suprior to patella- burning over anterior knee  Quality: burning, tight and dull ache  Relieving factors: medications, rest and heat  Aggravating factors: standing and walking  Progression: improved    Social Support  Stairs in house: yes (5)   Lives in: multiple-level home  Lives with: spouse and adult children    Hand dominance: right    Patient Goals  Patient goals for therapy: decreased pain, decreased edema, increased motion, increased strength and independence with ADLs/IADLs  Patient goal: walk without walker; heal        Objective     Observations     Additional Observation Details  Incision healing well with no signs of infection present      Neurological Testing     Sensation     Knee   Left Knee   Intact: light touch    Right Knee   Intact: light touch     Active Range of Motion   Left Knee   Hyperextension  Flexion: 135 degrees   Extension: -2 degrees     Right Knee   Flexion: 97 degrees   Extension: 10 degrees     Additional Active Range of Motion Details  Lacking 10 degrees from full extension on R    Passive Range of Motion   Left Knee   Normal passive range of motion    Right Knee   Flexion: 98 degrees   Extension: 8 degrees     Additional Passive Range of Motion Details  Lacking 8 degrees from full extension on R    Mobility   Patellar Mobility:   Left Knee   Hypermobile: left medial, left lateral, left superior and left inferior      Right Knee   WFL: medial and lateral  Hypomobile: superior and inferior     Strength/Myotome Testing     Left Hip   Planes of Motion   Flexion: 4+    Right Hip   Planes of Motion   Flexion: 4+    Left Knee   Normal strength  Flexion: 5  Extension: 5    Right Knee   Flexion: 4  Extension: 4-    Left Ankle/Foot   Dorsiflexion: 5  Plantar flexion: 5    Right Ankle/Foot   Dorsiflexion: 5  Plantar flexion: 5    Additional Strength Details  SLS L: 7 seconds  SLS R: 6 seconds    Flexibility:  - HS: minimal restriction B  - Gastroc: mild restriction on R, minimal on L    Ambulation   Weight-Bearing Status   Weight-Bearing Status (Right): weight-bearing as tolerated    Assistive device used: rollator walker    Ambulation: Level Surfaces   Ambulation with assistive device: independent  Ambulation without assistive device: unable    Ambulation: Stairs   Ascend stairs: contact guard assist (with crutch)  Pattern: non-reciprocal  Railings: two rails  Descend stairs: contact guard assist  Pattern: non-reciprocal  Railings: one rail    Functional Assessment        Comments  Shower seat and leg protector  Walker for ambulation  No work  No drive  Assist with IADLs               Precautions: L partial knee replacement, LBP        Manual  1/24 1/27  2-11 (RE)                 R LE/knee   10                                                                   PROM k' flexion   70                                Patellar mobilizations sup/inf   JG          Tibiofemoral joint mobilizations A/P   JG           PROM with OP into knee flex/ext     JG                    HEP: quad set, heels slides, standing hip extensin, flexion, abduction, toes raises, LAQ  Exercise Diary  1/24 1/27  2-11 (RE)                 Quad set 5"/20x                     Ball heel slides 20x 30x                   SLR nt 2x10wA                   Ball add set 10x 20x                   TB ABD nt 20x                   SAQ nt nt                                           LAQ 10x A 10x w/A                   Standing hip extension 10x home                   Hip abd 7x home                   Hip flexion bent knee 10x home                   Calf raises 10x home                   Heel slides sitting   5x                   slant nv nt                   Biodex nv nt                                 Above exercises performed at [employer] office   - - - - - - - - - -                 Recumbent bike     6 mins NV                                          Standing:                        - rockerboard gastroc stretch      standing at wall 4 x 30"                 - SLR 3 ways   2 x 10 ea NV          - front step ups             - lateral step ups             - mini squats                          - SLS             - Lateral cone walks                                       Seated:             - HS stretch (pressure over distal femur into knee extension)   4 x 30" NV          - LAQ (90-40 deg)   2 x 10 NV Supine:             - Heel props   3 mins NV          - quad sets   10 x 10"          - SLR flexion             - heel slides with strap   2 mins x 6" hold                                          Modalities  1/24 1/27                   Ice post 10 home                                                                     Access Code: [email or web address]   URL: [email or web address]   Date: 02/11/2020   Prepared by: Karen Wyatt      Exercises  · Gastroc Stretch on Wall - 4 reps - 30 seconds hold - 3x daily  · Seated Hamstring Stretch - 4 reps - 30 seconds hold - 3x daily  · Supine Knee Extension Mobilization with Weight - 4 minutes hold - 4x daily  · Supine Quadricep Sets - 10 reps - 10 seconds hold - 4x daily  · Supine Heel Slide with Strap - 20 reps - 6 secods hold - 4x daily

## 2020-02-12 ENCOUNTER — TELEPHONE (OUTPATIENT)
Dept: OBGYN CLINIC | Facility: CLINIC | Age: 60
End: 2020-02-12

## 2020-02-12 NOTE — TELEPHONE ENCOUNTER
Patient called with some medication questions  Patient wanted to verify that she does not need to take clebrex  How much longer she needed to take aspirin  If she could stop taking colace  All questions were addressed and patient had no further questions

## 2020-02-13 ENCOUNTER — OFFICE VISIT (OUTPATIENT)
Dept: PHYSICAL THERAPY | Facility: CLINIC | Age: 60
End: 2020-02-13
Payer: COMMERCIAL

## 2020-02-13 DIAGNOSIS — G89.29 CHRONIC PAIN OF LEFT KNEE: Primary | ICD-10-CM

## 2020-02-13 DIAGNOSIS — M25.562 CHRONIC PAIN OF LEFT KNEE: Primary | ICD-10-CM

## 2020-02-13 DIAGNOSIS — M17.11 PRIMARY OSTEOARTHRITIS OF RIGHT KNEE: ICD-10-CM

## 2020-02-13 DIAGNOSIS — Z96.651 S/P TKR (TOTAL KNEE REPLACEMENT) USING CEMENT, RIGHT: ICD-10-CM

## 2020-02-13 PROCEDURE — 97140 MANUAL THERAPY 1/> REGIONS: CPT | Performed by: PHYSICAL THERAPIST

## 2020-02-13 PROCEDURE — 97110 THERAPEUTIC EXERCISES: CPT | Performed by: PHYSICAL THERAPIST

## 2020-02-13 NOTE — PROGRESS NOTES
Daily Note     Today's date: 2020  Patient name: Freda Corey  : 1960  MRN: [id number]  Referring provider: Kayy Johnson DO  Dx:   Encounter Diagnosis     ICD-10-CM    1  Chronic pain of left knee M25 562     G89 29    2  S/P TKR (total knee replacement) using cement, right Z96 651    3  Primary osteoarthritis of right knee M17 11                   Subjective: Patient reports she tried driving around her property yesterday and was able to slam on the brakes with no issues  Pt reports she feels as though she is tweaking her knee in her sleep and is considering sleeping with the immobilizer donned  Objective: See treatment diary below      Assessment: Initiated exercises this date to address impairments noted during previous re-evaluation  Tolerated treatment well  Patient demonstrated fatigue post treatment, exhibited good technique with therapeutic exercises and would benefit from continued PT      Plan: Progress note during next visit        Precautions: L partial knee replacement, LBP        Manual    2-11 (RE)  2-13               R LE/knee   10                                                                   PROM k' flexion   70                                Patellar mobilizations sup/inf   JG          Tibiofemoral joint mobilizations A/P   JG           PROM with OP into knee flex/ext     JG                      Exercise Diary    2-11 (RE)  2-13               Quad set 5"/20x                     Ball heel slides 20x 30x                   SLR nt 2x10wA                   Ball add set 10x 20x                   TB ABD nt 20x                   SAQ nt nt                                           LAQ 10x A 10x w/A                   Standing hip extension 10x home                   Hip abd 7x home                   Hip flexion bent knee 10x home                   Calf raises 10x home                   Heel slides sitting   5x                   slant nv nt                   Biodex nv nt                                 Above exercises performed at [employer] office   - - - - - - - - - -                 Recumbent bike     6 mins NV  6 mins                                        Standing:                        - rockerboard gastroc stretch      standing at wall 4 x 30"  4 x 30"               - SLR 3 ways   2 x 10 ea NV 2 x 10 ea         - front step ups             - lateral step ups             - mini squats                          - SLS             - Lateral cone walks                                       Seated:             - HS stretch (pressure over distal femur into knee extension)   4 x 30" NV 4 x 30         - LAQ (90-40 deg)   2 x 10 NV 2 x 10                      Supine:             - Heel props   3 mins NV          - quad sets   10 x 10" 10 x 10"         - SLR flexion             - heel slides with strap   2 mins x 6" hold                                          Modalities  1/24 1/27                   Ice post 10 home

## 2020-02-18 ENCOUNTER — OFFICE VISIT (OUTPATIENT)
Dept: PHYSICAL THERAPY | Facility: CLINIC | Age: 60
End: 2020-02-18
Payer: COMMERCIAL

## 2020-02-18 DIAGNOSIS — Z96.651 S/P TKR (TOTAL KNEE REPLACEMENT) USING CEMENT, RIGHT: ICD-10-CM

## 2020-02-18 DIAGNOSIS — G89.29 CHRONIC PAIN OF LEFT KNEE: Primary | ICD-10-CM

## 2020-02-18 DIAGNOSIS — M25.562 CHRONIC PAIN OF LEFT KNEE: Primary | ICD-10-CM

## 2020-02-18 DIAGNOSIS — M17.11 PRIMARY OSTEOARTHRITIS OF RIGHT KNEE: ICD-10-CM

## 2020-02-18 NOTE — PROGRESS NOTES
Daily Note     Today's date: 2020  Patient name: Alberto Bella  : 1960  MRN: [id number]  Referring provider: Muna Watkins DO  Dx: No diagnosis found  Subjective: ***      Objective: See treatment diary below      Assessment: Tolerated treatment {Tolerated treatment :0922149363}   Patient {assessment:[id number]}      Plan: {PLAN:7650028798}     Precautions: L partial knee replacement, LBP        Manual    2-11 (RE)  2-13               R LE/knee   10                                                                   PROM k' flexion   70                                Patellar mobilizations sup/inf   JG          Tibiofemoral joint mobilizations A/P   JG           PROM with OP into knee flex/ext     JG                      Exercise Diary    2-11 (RE)  2-13               Quad set 5"/20x                     Ball heel slides 20x 30x                   SLR nt 2x10wA                   Ball add set 10x 20x                   TB ABD nt 20x                   SAQ nt nt                                           LAQ 10x A 10x w/A                   Standing hip extension 10x home                   Hip abd 7x home                   Hip flexion bent knee 10x home                   Calf raises 10x home                   Heel slides sitting   5x                   slant nv nt                   Biodex nv nt                                 Above exercises performed at [facility] office   - - - - - - - - - -                 Recumbent bike     6 mins NV  6 mins                                        Standing:                        - rockerboard gastroc stretch      standing at wall 4 x 30"  4 x 30"               - SLR 3 ways   2 x 10 ea NV 2 x 10 ea         - front step ups             - lateral step ups             - mini squats                          - SLS             - Lateral cone walks                                       Seated:             - HS stretch (pressure over distal femur into knee extension)   4 x 30" NV 4 x 30         - LAQ (90-40 deg)   2 x 10 NV 2 x 10                      Supine:             - Heel props   3 mins NV          - quad sets   10 x 10" 10 x 10"         - SLR flexion             - heel slides with strap   2 mins x 6" hold                                          Modalities  1/24 1/27                   Ice post 10 home

## 2020-02-19 ENCOUNTER — OFFICE VISIT (OUTPATIENT)
Dept: FAMILY MEDICINE CLINIC | Facility: CLINIC | Age: 60
End: 2020-02-19
Payer: COMMERCIAL

## 2020-02-19 ENCOUNTER — TELEPHONE (OUTPATIENT)
Dept: OBGYN CLINIC | Facility: CLINIC | Age: 60
End: 2020-02-19

## 2020-02-19 ENCOUNTER — APPOINTMENT (OUTPATIENT)
Dept: PHYSICAL THERAPY | Facility: CLINIC | Age: 60
End: 2020-02-19
Payer: COMMERCIAL

## 2020-02-19 VITALS
HEIGHT: 59 IN | HEART RATE: 78 BPM | WEIGHT: 167.4 LBS | RESPIRATION RATE: 18 BRPM | TEMPERATURE: 98.8 F | BODY MASS INDEX: 33.75 KG/M2 | OXYGEN SATURATION: 99 % | SYSTOLIC BLOOD PRESSURE: 144 MMHG | DIASTOLIC BLOOD PRESSURE: 88 MMHG

## 2020-02-19 DIAGNOSIS — F41.8 SITUATIONAL ANXIETY: ICD-10-CM

## 2020-02-19 DIAGNOSIS — R03.0 ELEVATED BLOOD PRESSURE READING: Primary | ICD-10-CM

## 2020-02-19 PROCEDURE — 1036F TOBACCO NON-USER: CPT | Performed by: FAMILY MEDICINE

## 2020-02-19 PROCEDURE — 1111F DSCHRG MED/CURRENT MED MERGE: CPT | Performed by: FAMILY MEDICINE

## 2020-02-19 PROCEDURE — 3008F BODY MASS INDEX DOCD: CPT | Performed by: FAMILY MEDICINE

## 2020-02-19 PROCEDURE — 99214 OFFICE O/P EST MOD 30 MIN: CPT | Performed by: FAMILY MEDICINE

## 2020-02-19 PROCEDURE — 3077F SYST BP >= 140 MM HG: CPT | Performed by: FAMILY MEDICINE

## 2020-02-19 PROCEDURE — 3079F DIAST BP 80-89 MM HG: CPT | Performed by: FAMILY MEDICINE

## 2020-02-19 RX ORDER — ALPRAZOLAM 0.25 MG/1
0.25 TABLET ORAL DAILY PRN
Qty: 30 TABLET | Refills: 0 | Status: SHIPPED | OUTPATIENT
Start: 2020-02-19 | End: 2020-03-18 | Stop reason: SDUPTHER

## 2020-02-19 NOTE — TELEPHONE ENCOUNTER
Thank you for the update  She should definitely make an appointment to follow up with her primary care provider within the next day or 2  It is unlikely that any of the medications that we have given her postoperatively are affecting her blood pressure at this point

## 2020-02-19 NOTE — TELEPHONE ENCOUNTER
Patient called in regards to some BP issues she has been having  Patient states that she took her BP prior to PT 02/18  BP reading ws 161/111 at her PT session they did take her BP several times and that bottom number stayed 96-97  PT was unable to due the full session due to this  Patient rescheduled PT for 02/19 at 9:45AM  Patient took her BP twice this morning 146/97  138/96  Her PT is in the same building as her PCP  Patient stated that she would be contacting her PCP as well in regards to her BP issue

## 2020-02-19 NOTE — PROGRESS NOTES
Daily Note     Today's date: 2020  Patient name: Ana Mohan  : 1960  MRN: [id number]  Referring provider: Prakash Abreu DO  Dx: No diagnosis found  Subjective: ***      Objective: See treatment diary below      Assessment: Tolerated treatment {Tolerated treatment :8599035886}   Patient {assessment:7670758890}      Plan: {PLAN:6456359193}     Precautions: L partial knee replacement, LBP        Manual    2-11 (RE)  2-13               R LE/knee   10                                                                   PROM k' flexion   70                                Patellar mobilizations sup/inf   JG          Tibiofemoral joint mobilizations A/P   JG           PROM with OP into knee flex/ext     JG                      Exercise Diary    2-11 (RE)  2-13               Quad set 5"/20x                     Ball heel slides 20x 30x                   SLR nt 2x10wA                   Ball add set 10x 20x                   TB ABD nt 20x                   SAQ nt nt                                           LAQ 10x A 10x w/A                   Standing hip extension 10x home                   Hip abd 7x home                   Hip flexion bent knee 10x home                   Calf raises 10x home                   Heel slides sitting   5x                   slant nv nt                   Biodex nv nt                                 Above exercises performed at [facility]   - - - - - - - - - -                 Recumbent bike     6 mins NV  6 mins                                        Standing:                        - rockerboard gastroc stretch      standing at wall 4 x 30"  4 x 30"               - SLR 3 ways   2 x 10 ea NV 2 x 10 ea         - front step ups             - lateral step ups             - mini squats                          - SLS             - Lateral cone walks                                       Seated:             - HS stretch (pressure over distal femur into knee extension)   4 x 30" NV 4 x 30         - LAQ (90-40 deg)   2 x 10 NV 2 x 10                      Supine:             - Heel props   3 mins NV          - quad sets   10 x 10" 10 x 10"         - SLR flexion             - heel slides with strap   2 mins x 6" hold                                          Modalities  1/24 1/27                   Ice post 10 home

## 2020-02-20 ENCOUNTER — OFFICE VISIT (OUTPATIENT)
Dept: PHYSICAL THERAPY | Facility: CLINIC | Age: 60
End: 2020-02-20
Payer: COMMERCIAL

## 2020-02-20 ENCOUNTER — OFFICE VISIT (OUTPATIENT)
Dept: OBGYN CLINIC | Facility: CLINIC | Age: 60
End: 2020-02-20

## 2020-02-20 VITALS
WEIGHT: 164 LBS | BODY MASS INDEX: 33.06 KG/M2 | HEIGHT: 59 IN | HEART RATE: 73 BPM | DIASTOLIC BLOOD PRESSURE: 83 MMHG | SYSTOLIC BLOOD PRESSURE: 128 MMHG

## 2020-02-20 DIAGNOSIS — Z96.651 AFTERCARE FOLLOWING RIGHT KNEE JOINT REPLACEMENT SURGERY: ICD-10-CM

## 2020-02-20 DIAGNOSIS — Z96.651 S/P TKR (TOTAL KNEE REPLACEMENT) USING CEMENT, RIGHT: Primary | ICD-10-CM

## 2020-02-20 DIAGNOSIS — M17.11 PRIMARY OSTEOARTHRITIS OF RIGHT KNEE: ICD-10-CM

## 2020-02-20 DIAGNOSIS — Z47.1 AFTERCARE FOLLOWING RIGHT KNEE JOINT REPLACEMENT SURGERY: ICD-10-CM

## 2020-02-20 DIAGNOSIS — M25.562 CHRONIC PAIN OF LEFT KNEE: Primary | ICD-10-CM

## 2020-02-20 DIAGNOSIS — Z96.651 S/P TKR (TOTAL KNEE REPLACEMENT) USING CEMENT, RIGHT: ICD-10-CM

## 2020-02-20 DIAGNOSIS — G89.29 CHRONIC PAIN OF LEFT KNEE: Primary | ICD-10-CM

## 2020-02-20 PROCEDURE — 3079F DIAST BP 80-89 MM HG: CPT | Performed by: ORTHOPAEDIC SURGERY

## 2020-02-20 PROCEDURE — 3008F BODY MASS INDEX DOCD: CPT | Performed by: ORTHOPAEDIC SURGERY

## 2020-02-20 PROCEDURE — 99024 POSTOP FOLLOW-UP VISIT: CPT | Performed by: ORTHOPAEDIC SURGERY

## 2020-02-20 PROCEDURE — 1111F DSCHRG MED/CURRENT MED MERGE: CPT | Performed by: ORTHOPAEDIC SURGERY

## 2020-02-20 PROCEDURE — 3074F SYST BP LT 130 MM HG: CPT | Performed by: ORTHOPAEDIC SURGERY

## 2020-02-20 NOTE — PROGRESS NOTES
Assessment/Plan:  1  S/P TKR (total knee replacement) using cement, right     2  Aftercare following right knee joint replacement surgery       Moses Pa is a very pleasant 21-year-old female presenting 1 month after undergoing a right total knee arthroplasty  She has done very well over the past 2 weeks to regain range of motion and now has flexion to about 110°  Passively, she has full extension but an active 5 degree extension lag  We encouraged her to continue working on extension and normalizing her gait with physical therapy  We are pleased that her blood pressure issues are resolving with the addition of Xanax  We encouraged her to use Tylenol in lieu believe  She should continue working with physical therapy and her home exercise program   Would like to see her back in 2 weeks for her regularly scheduled 6 week postop appointment  She will not need new x-rays at that time  She and her  expressed understanding all of her questions were addressed today  Subjective: 4 5 weeks s/p right TKA    Patient ID: Aubree Nunes is a 64 y o  female  Dakotamaximilian Emanuel is a very pleasant 64year old female presenting for a range of motion check 1 month after undergoing a right total knee arthroplasty  She has been working diligently with physical therapy and her home exercise program to regain range of motion  She has been compliant with the aspirin twice a day for DVT prophylaxis  She is ambulating now with a cane  She reports that she has made steady improvements  She has had difficulty with labile blood pressures, and was recently evaluated by her primary care physician who prescribe Xanax as it was felt that anxiety may be contributing to her ongoing blood pressure issues  She does feel better after taking Xanax and her blood pressure has normalized  Review of Systems   Constitutional: Negative  HENT: Negative  Eyes: Negative  Respiratory: Negative  Cardiovascular: Negative  Gastrointestinal: Negative  Endocrine: Negative  Genitourinary: Negative  Musculoskeletal: Positive for arthralgias  Skin: Negative  Allergic/Immunologic: Negative  Neurological: Negative  Hematological: Negative  Psychiatric/Behavioral: The patient is nervous/anxious  Past Medical History:   Diagnosis Date    Seasonal allergies     Sinus congestion     developed nasal congestion and cough 21- is going to call PCP today for RX; advised to call surgeon if not better next week      Skin lesion of left lower extremity 2019    Torn medial meniscus     [id number]  last assessed       Past Surgical History:   Procedure Laterality Date     SECTION      x2    COLONOSCOPY      KNEE ARTHROSCOPY, MEDIAL PATELLO FEMORAL LIGAMENT REPAIR      patellar closed    KNEE SURGERY      with medial meniscus repair    NH TOTAL KNEE ARTHROPLASTY Right 2020    Procedure: ARTHROPLASTY KNEE TOTAL;  Surgeon: Esthela Rodriguez DO;  Location: [facility];  Service: Orthopedics    REPLACEMENT TOTAL KNEE         Family History   Problem Relation Age of Onset    Hypertension Mother     No Known Problems Father     Colon cancer Maternal Grandfather [de-identified]    No Known Problems Daughter     No Known Problems Maternal Grandmother     No Known Problems Paternal Grandmother     Pancreatic cancer Maternal Aunt 76    No Known Problems Daughter     No Known Problems Paternal Aunt     Breast cancer Neg Hx        Social History     Occupational History    Not on file   Tobacco Use    Smoking status: Never Smoker    Smokeless tobacco: Never Used   Substance and Sexual Activity    Alcohol use: Yes     Frequency: Monthly or less     Drinks per session: 1 or 2     Binge frequency: Never     Comment: social drinker in all scripts    Drug use: No    Sexual activity: Not on file         Current Outpatient Medications:     acetaminophen (TYLENOL) 325 mg tablet, Take 3 tablets (975 mg total) by mouth every 8 (eight) hours, Disp: 30 tablet, Rfl: 0    alendronate (FOSAMAX) 70 mg tablet, Take 1 tablet (70 mg total) by mouth every 7 days, Disp: 4 tablet, Rfl: 5    ALPRAZolam (XANAX) 0 25 mg tablet, Take 1 tablet (0 25 mg total) by mouth daily as needed for anxiety, Disp: 30 tablet, Rfl: 0    aspirin 325 mg tablet, Take 1 tablet (325 mg total) by mouth 2 (two) times a day, Disp: 84 tablet, Rfl: 0    celecoxib (CeleBREX) 100 mg capsule, Take 1 capsule (100 mg total) by mouth 2 (two) times a day (Patient not taking: Reported on 2/11/2020), Disp: 28 capsule, Rfl: 0    docusate sodium (COLACE) 100 mg capsule, Take 1 capsule (100 mg total) by mouth 2 (two) times a day (Patient not taking: Reported on 2/19/2020), Disp: 60 capsule, Rfl: 0    estradiol (ESTRACE) 0 1 mg/g vaginal cream, Insert 1 g into the vagina 3 (three) times a week, Disp: 42 5 g, Rfl: 3    fluticasone (FLONASE) 50 mcg/act nasal spray, 1 spray into each nostril 2 (two) times a day, Disp: , Rfl:     omeprazole (PriLOSEC) 20 mg delayed release capsule, Take 20 mg by mouth daily, Disp: , Rfl:     ondansetron (ZOFRAN) 4 mg tablet, Take 1 tablet (4 mg total) by mouth every 8 (eight) hours as needed for nausea or vomiting (Patient not taking: Reported on 2/11/2020), Disp: 20 tablet, Rfl: 0    traMADol (ULTRAM) 50 mg tablet, 1-2 tabs every six hours as needed for pain (Patient not taking: Reported on 2/11/2020), Disp: 48 tablet, Rfl: 0    No Known Allergies    Objective: There were no vitals filed for this visit  There is no height or weight on file to calculate BMI  Right Knee Exam     Tenderness   The patient is experiencing no tenderness       Range of Motion   Extension:  5 (soft endpoint, 0 passively) abnormal   Flexion:  110 abnormal     Tests   Varus: negative Valgus: negative  Drawer:  Anterior - negative    Posterior - negative    Other   Erythema: absent  Scars: present (nearly healed anterior operative incision that is clean, dry, in tact and shows no signs of infection)  Sensation: normal  Pulse: present  Swelling: mild  Effusion: no effusion present    Comments:  Stable to varus and valgus stress throughout the available range of motion  Neurovascularly intact distally  No warmth, erythema or signs of infection; incision well approximated  Calf is soft and non-tender  Ambulates slowly with a cane          Observations     Right Knee   Negative for effusion  Physical Exam   Constitutional: She is oriented to person, place, and time  She appears well-developed and well-nourished  Body mass index is 33 12 kg/m²  HENT:   Head: Normocephalic and atraumatic  Eyes: EOM are normal    Neck: Normal range of motion  Cardiovascular: Intact distal pulses  Pulmonary/Chest: Effort normal    Musculoskeletal:        Right knee: She exhibits no effusion  See ortho exam   Neurological: She is alert and oriented to person, place, and time  Skin: Skin is warm and dry  Psychiatric: She has a normal mood and affect  Her behavior is normal  Judgment and thought content normal    Nursing note and vitals reviewed

## 2020-02-21 ENCOUNTER — OFFICE VISIT (OUTPATIENT)
Dept: PHYSICAL THERAPY | Facility: CLINIC | Age: 60
End: 2020-02-21
Payer: COMMERCIAL

## 2020-02-21 ENCOUNTER — TELEPHONE (OUTPATIENT)
Dept: FAMILY MEDICINE CLINIC | Facility: CLINIC | Age: 60
End: 2020-02-21

## 2020-02-21 DIAGNOSIS — Z96.651 S/P TKR (TOTAL KNEE REPLACEMENT) USING CEMENT, RIGHT: ICD-10-CM

## 2020-02-21 DIAGNOSIS — M17.11 PRIMARY OSTEOARTHRITIS OF RIGHT KNEE: ICD-10-CM

## 2020-02-21 DIAGNOSIS — G89.29 CHRONIC PAIN OF LEFT KNEE: Primary | ICD-10-CM

## 2020-02-21 DIAGNOSIS — M25.562 CHRONIC PAIN OF LEFT KNEE: Primary | ICD-10-CM

## 2020-02-21 NOTE — PROGRESS NOTES
Pt arrival for her PT appointment  Checked patients BP was unable to see since her BP was too high to perform physical activities safely  Advised patient to go to her MD and gave patient a copy of her BP's kept on file from 2/18, 2/20, and 2/21 since was unable to do PT  Pt reported she did not take any pain killers due to they may have increased her BP  And reported she felt normal no dizziness, did not feel like her heart was racing  Today 3 to 5 minute intervals in between each time taken BP       1st 147/101, 97 HR  2nd 130/105, 85HR  3rd 130/99, 80 HR  4th 129/102, 81HR  At this point since patient was unable to be seen for 3 PT visits even though she was just at the MD yesterday and her BP was okay to go to the MD again

## 2020-02-21 NOTE — TELEPHONE ENCOUNTER
Ok they are running a little high  If these are only at PT and it is normal otherwise then would just continue to monitor

## 2020-02-21 NOTE — TELEPHONE ENCOUNTER
Maryam Easton stopped into the office to drop off some BP readings (placed on your desk),  She stated when she is seen here and at other doctors her BP readings are good, but whenever she goes to PT her readings are not good    The paper shows her readings from when she is at PT

## 2020-02-25 ENCOUNTER — APPOINTMENT (OUTPATIENT)
Dept: PHYSICAL THERAPY | Facility: CLINIC | Age: 60
End: 2020-02-25
Payer: COMMERCIAL

## 2020-02-26 ENCOUNTER — OFFICE VISIT (OUTPATIENT)
Dept: PHYSICAL THERAPY | Facility: CLINIC | Age: 60
End: 2020-02-26
Payer: COMMERCIAL

## 2020-02-26 DIAGNOSIS — G89.29 CHRONIC PAIN OF LEFT KNEE: Primary | ICD-10-CM

## 2020-02-26 DIAGNOSIS — M17.11 PRIMARY OSTEOARTHRITIS OF RIGHT KNEE: ICD-10-CM

## 2020-02-26 DIAGNOSIS — M25.562 CHRONIC PAIN OF LEFT KNEE: Primary | ICD-10-CM

## 2020-02-26 DIAGNOSIS — Z96.651 S/P TKR (TOTAL KNEE REPLACEMENT) USING CEMENT, RIGHT: ICD-10-CM

## 2020-02-26 NOTE — TELEPHONE ENCOUNTER
Patient called because she is STILL having issues with her BP ONLY at physical therapy  Patient has seen PCP and her blood pressure has been ok  When she was in the office to see her BP was within normal range as well  Patient has been unable to do PT because of this    Looking for guidance from Dr Juan Gonzalez

## 2020-02-26 NOTE — PROGRESS NOTES
Pt arrived for PT appointment reported that when she was taking to the MD, MD reported to try taking her BP on the L side due to closer to the heart, instead of the R  Pt reported prior to arrival for appointment at 12:09pm patient took her BP (140/95) on L arm  BP within therapy session prior to start any form of exercise  Recommended for Diastolic to be below 90 to be able to start treatment safely  All BP's taken with at least 5 minutes in between each other        1st : L arm 134/102 BP, 94HR  2nd: L arm 116/88 BP, 89HR  Due to a significant drop in BP took manually L arm 136/98 BP    3rd  Pt requested to try her R arm this time 132/99BP, 79HR  Then since that number was not within the range to try the L arm 127/95 BP 82HR  Unfortunately patient unable to perform PT this visit  Asked patient is she was feeling okay and she reported that she feels great better than she has been in the past and walking with little use of her cane and feels like she is making little progressions  Pt reported at this point in time to request to put all appointments on hold and call her MD later today because this is the 4th time she came for a PT appointment and unable to exercises and gain strength for her Knees due to her BP not  Being within the right standards  Pt educated within PT we will be able to hold her chart for a month since she was last seen   Pt educated to call back when she hears back from her MD

## 2020-02-26 NOTE — TELEPHONE ENCOUNTER
The patient needs physical therapy  Please have her PCP address her continued blood pressure issues

## 2020-02-27 ENCOUNTER — APPOINTMENT (OUTPATIENT)
Dept: PHYSICAL THERAPY | Facility: CLINIC | Age: 60
End: 2020-02-27
Payer: COMMERCIAL

## 2020-02-27 NOTE — TELEPHONE ENCOUNTER
Dr Greg Wilkins did call the patient and discuss that he is working with physical therapy on this issue

## 2020-02-28 ENCOUNTER — OFFICE VISIT (OUTPATIENT)
Dept: PHYSICAL THERAPY | Facility: CLINIC | Age: 60
End: 2020-02-28
Payer: COMMERCIAL

## 2020-02-28 DIAGNOSIS — R03.0 ELEVATED BLOOD PRESSURE READING: Primary | ICD-10-CM

## 2020-02-28 DIAGNOSIS — M25.562 CHRONIC PAIN OF LEFT KNEE: Primary | ICD-10-CM

## 2020-02-28 DIAGNOSIS — Z96.651 S/P TKR (TOTAL KNEE REPLACEMENT) USING CEMENT, RIGHT: ICD-10-CM

## 2020-02-28 DIAGNOSIS — M17.11 PRIMARY OSTEOARTHRITIS OF RIGHT KNEE: ICD-10-CM

## 2020-02-28 DIAGNOSIS — G89.29 CHRONIC PAIN OF LEFT KNEE: Primary | ICD-10-CM

## 2020-02-28 PROCEDURE — 97110 THERAPEUTIC EXERCISES: CPT

## 2020-02-28 RX ORDER — AMLODIPINE BESYLATE 2.5 MG/1
2.5 TABLET ORAL DAILY
Qty: 30 TABLET | Refills: 1 | Status: SHIPPED | OUTPATIENT
Start: 2020-02-28 | End: 2021-04-27 | Stop reason: ALTCHOICE

## 2020-02-28 NOTE — PROGRESS NOTES
Daily Note     Today's date: 2020  Patient name: Luz Ceja  : 1960  MRN: [id number]  Referring provider: Placido Kraft DO  Dx:   Encounter Diagnosis     ICD-10-CM    1  Chronic pain of left knee M25 562     G89 29    2  S/P TKR (total knee replacement) using cement, right Z96 651    3  Primary osteoarthritis of right knee M17 11        Start Time: 0740  Stop Time: 0815  Total time in clinic (min): 35 minutes    Subjective: Pt reported her knee feels okay along with her BP has been good at home  138/84, 138/82 at home Pt reported that the diastolic normally stays within the ranges  Pt reported, that Dr Ebenezer Leyva reported that everything is okay with her BP that everything if fine and reported that it may be anxiety with being in physical therapy  Objective: See treatment diary below  PRE:   Knee flexion 106  Knee extension -5 (5 degrees off of full extension)  POST PROM  Knee flexion 110 AROM   Knee extension -4     BP pre bike 129/98 84HR  Post bike 130/100 85HR  Performed only manual PROM after  While laying with PROM 129/98 84HR  Post PROM and prone knee hang 130/96         Per staff messsage from 20 when PT reached out stating pt has missed her past 4 treatment sessions due to diastolic blood pressure greater than 100mmHG at rest taken prior to session, Dr Clint Morrison responded as follows: " It is my understanding that she has been cleared for her physical therapy exercises as her abnormal pressure readings are likely psychosomatic in nature  Please disregard your hypertension parameter for this patient in this unique scenario  Unfortunately these decisions have cost her valuable time"  Assessment:  During treatment the diastolic has not reached greater than 100mmHG, taken multiple times throughout the treatment session to make sure BP did not go higher than 615 mmHg diastolic  Performed the bike and PROM today with measurements, no strenuous activities  Teri Antunez  Pt educated when her BP is lower to perform the higher level exercises like step ups at home  Tolerated treatment well  Patient demonstrated fatigue post treatment, exhibited good technique with therapeutic exercises and would benefit from continued PT      Plan: Continue per plan of care        Precautions: L partial knee replacement, LBP        Manual  1/24 1/27  2-11 (RE)  2-13 2/28             R LE/knee   10                                                                   PROM k' flexion   70                                Patellar mobilizations sup/inf   JG  JG        Tibiofemoral joint mobilizations A/P   JG           PROM with OP into knee flex/ext     JG   [facility]                  Exercise Diary  1/24 1/27  2-11 (RE)  2-13 2/28             Quad set 5"/20x                     Ball heel slides 20x 30x                   SLR nt 2x10wA                   Ball add set 10x 20x                   TB ABD nt 20x                   SAQ nt nt                                           LAQ 10x A 10x w/A                   Standing hip extension 10x home                   Hip abd 7x home                   Hip flexion bent knee 10x home                   Calf raises 10x home                   Heel slides sitting   5x                   slant nv nt                   Biodex nv nt                   Prone knee hang      3 min          Above exercises performed at [facility] office   - - - - - - - - - -                 Recumbent bike     6 mins NV  6 mins  6 min                                       Standing:                        - rockerboard gastroc stretch      standing at wall 4 x 30"  4 x 30"               - SLR 3 ways   2 x 10 ea NV 2 x 10 ea         - front step ups             - lateral step ups             - mini squats                          - SLS             - Lateral cone walks                                       Seated:             - HS stretch (pressure over distal femur into knee extension)   4 x 30" NV 4 x 30 - LAQ (90-40 deg)   2 x 10 NV 2 x 10                      Supine:             - Heel props   3 mins NV          - quad sets   10 x 10" 10 x 10"         - SLR flexion             - heel slides with strap   2 mins x 6" hold                                          Modalities  1/24 1/27                   Ice post 10 home

## 2020-03-02 ENCOUNTER — APPOINTMENT (OUTPATIENT)
Dept: PHYSICAL THERAPY | Facility: CLINIC | Age: 60
End: 2020-03-02
Payer: COMMERCIAL

## 2020-03-03 ENCOUNTER — OFFICE VISIT (OUTPATIENT)
Dept: PHYSICAL THERAPY | Facility: CLINIC | Age: 60
End: 2020-03-03
Payer: COMMERCIAL

## 2020-03-03 ENCOUNTER — APPOINTMENT (OUTPATIENT)
Dept: PHYSICAL THERAPY | Facility: CLINIC | Age: 60
End: 2020-03-03
Payer: COMMERCIAL

## 2020-03-03 DIAGNOSIS — M25.562 CHRONIC PAIN OF LEFT KNEE: Primary | ICD-10-CM

## 2020-03-03 DIAGNOSIS — G89.29 CHRONIC PAIN OF LEFT KNEE: Primary | ICD-10-CM

## 2020-03-03 DIAGNOSIS — Z96.651 S/P TKR (TOTAL KNEE REPLACEMENT) USING CEMENT, RIGHT: ICD-10-CM

## 2020-03-03 DIAGNOSIS — M17.11 PRIMARY OSTEOARTHRITIS OF RIGHT KNEE: ICD-10-CM

## 2020-03-03 PROCEDURE — 97530 THERAPEUTIC ACTIVITIES: CPT | Performed by: PHYSICAL THERAPIST

## 2020-03-03 PROCEDURE — 97140 MANUAL THERAPY 1/> REGIONS: CPT | Performed by: PHYSICAL THERAPIST

## 2020-03-03 PROCEDURE — 97110 THERAPEUTIC EXERCISES: CPT | Performed by: PHYSICAL THERAPIST

## 2020-03-03 NOTE — PROGRESS NOTES
Daily Note     Today's date: 3/3/2020  Patient name: Yash Welsh  : 1960  MRN: [id number]  Referring provider: Husam Fuller DO  Dx:   Encounter Diagnosis     ICD-10-CM    1  Chronic pain of left knee M25 562     G89 29    2  S/P TKR (total knee replacement) using cement, right Z96 651    3  Primary osteoarthritis of right knee M17 11                   Subjective: Patient states she is now able to go up/down stairs reciprocally slowly  She is not taking pain medication and is taking only a low dose aspirin  Leg swells slightly when doing a lot around the house  Objective: See treatment diary below      Assessment: Tolerated treatment well  Patient would benefit from continued PT  AROM knee flexion to 120 degrees today  Plan: Continue per plan of care        Precautions: L partial knee replacement, LBP        Manual    2-11 (RE)  2-13  2/28  3/3           R LE/knee   10                                                                   PROM k' flexion   70                                Patellar mobilizations sup/inf   JG  JG        Tibiofemoral joint mobilizations A/P   JG   JF        PROM with OP into knee flex/ext     JG   [facility]  JF                Exercise Diary    2-11 (RE)  2-13  2/28  3/3       BP      130/92     Quad set 5"/20x                 Ball heel slides 20x 30x        heel slide with strap 5"x20       SLR nt 2x10wA        2x10       Ball add set 10x 20x               TB ABD nt 20x               SAQ nt nt                                   LAQ 10x A 10x w/A               Standing hip extension 10x home               Hip abd 7x home               Hip flexion bent knee 10x home               Calf raises 10x home               Heel slides sitting   5x               slant nv nt               Biodex nv nt               Prone knee hang      3 min        Above exercises performed at [facility] office   - - - - - - - -               Recumbent bike     6 mins NV  6 mins  6 min                               Standing:                    - rockerboard gastroc stretch      standing at wall 4 x 30"  4 x 30"    4x30" SB       - SLR 3 ways   2 x 10 ea NV 2 x 10 ea  2 x 10 ea     - front step ups      6" x20     - lateral step ups      6"x20     - mini squats      2x10                - SLS           - Lateral cone walks                                 Seated:           - HS stretch (pressure over distal femur into knee extension)   4 x 30" NV 4 x 30       - LAQ (90-40 deg)   2 x 10 NV 2 x 10                  Supine:           - Heel props   3 mins NV        - quad sets   10 x 10" 10 x 10"  5"x20     - SLR flexion           - heel slides with strap   2 mins x 6" hold                                    Modalities  1/24 1/27                   Ice post 10 home

## 2020-03-03 NOTE — PROGRESS NOTES
Daily Note     Today's date: 3/3/2020  Patient name: Aubree Nunes  : 1960  MRN: [id number]  Referring provider: Arturo Olsen DO  Dx:   Encounter Diagnosis     ICD-10-CM    1  Chronic pain of left knee M25 562     G89 29    2  S/P TKR (total knee replacement) using cement, right Z96 651    3  Primary osteoarthritis of right knee M17 11                   Subjective: ***      Objective: See treatment diary below      Assessment: Tolerated treatment {Tolerated treatment :8507232983}   Patient {assessment:5388572325}      Plan: {PLAN:9932476774}     Precautions: L partial knee replacement, LBP        Manual    2-11 (RE)  2-13               R LE/knee   10                                                                   PROM k' flexion   70                                Patellar mobilizations sup/inf   JG  JG        Tibiofemoral joint mobilizations A/P   JG           PROM with OP into knee flex/ext     JG   [facility]                  Exercise Diary    2-11 (RE)  2-             Quad set 5"/20x                     Ball heel slides 20x 30x                   SLR nt 2x10wA                   Ball add set 10x 20x                   TB ABD nt 20x                   SAQ nt nt                                           LAQ 10x A 10x w/A                   Standing hip extension 10x home                   Hip abd 7x home                   Hip flexion bent knee 10x home                   Calf raises 10x home                   Heel slides sitting   5x                   slant nv nt                   Biodex nv nt                   Prone knee hang      3 min          Above exercises performed at [facility] office   - - - - - - - - - -                 Recumbent bike     6 mins NV  6 mins  6 min                                       Standing:                        - rockerboard gastroc stretch      standing at wall 4 x 30"  4 x 30"               - SLR 3 ways   2 x 10 ea NV 2 x 10 ea         - front step ups             - lateral step ups             - mini squats                          - SLS             - Lateral cone walks                                       Seated:             - HS stretch (pressure over distal femur into knee extension)   4 x 30" NV 4 x 30         - LAQ (90-40 deg)   2 x 10 NV 2 x 10                      Supine:             - Heel props   3 mins NV          - quad sets   10 x 10" 10 x 10"         - SLR flexion             - heel slides with strap   2 mins x 6" hold                                          Modalities  1/24 1/27                   Ice post 10 home

## 2020-03-04 ENCOUNTER — APPOINTMENT (OUTPATIENT)
Dept: PHYSICAL THERAPY | Facility: CLINIC | Age: 60
End: 2020-03-04
Payer: COMMERCIAL

## 2020-03-05 ENCOUNTER — OFFICE VISIT (OUTPATIENT)
Dept: PHYSICAL THERAPY | Facility: CLINIC | Age: 60
End: 2020-03-05
Payer: COMMERCIAL

## 2020-03-05 ENCOUNTER — APPOINTMENT (OUTPATIENT)
Dept: PHYSICAL THERAPY | Facility: CLINIC | Age: 60
End: 2020-03-05
Payer: COMMERCIAL

## 2020-03-05 DIAGNOSIS — Z96.651 S/P TKR (TOTAL KNEE REPLACEMENT) USING CEMENT, RIGHT: ICD-10-CM

## 2020-03-05 DIAGNOSIS — M25.562 CHRONIC PAIN OF LEFT KNEE: Primary | ICD-10-CM

## 2020-03-05 DIAGNOSIS — G89.29 CHRONIC PAIN OF LEFT KNEE: Primary | ICD-10-CM

## 2020-03-05 DIAGNOSIS — M17.11 PRIMARY OSTEOARTHRITIS OF RIGHT KNEE: ICD-10-CM

## 2020-03-05 PROCEDURE — 97140 MANUAL THERAPY 1/> REGIONS: CPT | Performed by: PHYSICAL THERAPIST

## 2020-03-05 PROCEDURE — 97530 THERAPEUTIC ACTIVITIES: CPT | Performed by: PHYSICAL THERAPIST

## 2020-03-05 PROCEDURE — 97110 THERAPEUTIC EXERCISES: CPT | Performed by: PHYSICAL THERAPIST

## 2020-03-05 NOTE — PROGRESS NOTES
Daily Note     Today's date: 3/5/2020  Patient name: Cecy Shea  : 1960  MRN: [id number]  Referring provider: Cabrera Melvin DO  Dx:   Encounter Diagnosis     ICD-10-CM    1  Chronic pain of left knee M25 562     G89 29    2  S/P TKR (total knee replacement) using cement, right Z96 651    3  Primary osteoarthritis of right knee M17 11                   Subjective: Patient reports she did not perform her HEP yesterday because she was too busy  Pt states today she was very busy and is feeling stiff  Objective: See treatment diary below    Pre Manual:  AROM: 0-8-110  PROM: 0-6-114    Post Manual:  AROM: 0-4-118  PROM: 0-2-118    Assessment: Progressed exercises this date as charted  Pt was able to tolerate all exercises with fatigue  Pt demonstrated fair tolerance to stretching this date  Educated pt on importance of performing HEP for ROM 3-5 times per day  Tolerated treatment well  Patient demonstrated fatigue post treatment, exhibited good technique with therapeutic exercises and would benefit from continued PT  Continued to exercise with BP over recommended levels for exercise per direction from Dr Juan Gonzalez and Dr Luz Yan  Plan: Potential discharge next visit       Precautions: L partial knee replacement, LBP        Manual    2-11 (RE)  2-13  2/28  3/3  3-5         R LE/knee   10                                                                   PROM k' flexion   70                                Patellar mobilizations sup/inf   JG  JG  JG      Tibiofemoral joint mobilizations A/P   JG   JF JG       PROM with OP into knee flex/ext     JG    SC  JF  JG              Exercise Diary    2-11 (RE)  2-13    3/3  3-5     BP      130/92 151/102 mmHG, HR: 82bpm    Prone knee hang      3 min                   Recumbent bike     6 mins NV  6 mins  6 min     10 mins                          Standing:                    - rockerboard gastroc stretch      standing at wall 4 x 30"  4 x 30"    4x30" SB  4 x 30"     - SLR 3 ways   2 x 10 ea NV 2 x 10 ea  2 x 10 ea 2 x 10 ea    - front step ups      6" x20 2 x 10 6" step    - lateral step ups      6"x20 2 x 10 6" step    - mini squats      2x10 2 x 10               - SLS           - Lateral cone walks                                 Seated:           - HS stretch (pressure over distal femur into knee extension)   4 x 30" NV 4 x 30       - LAQ (90-40 deg)   2 x 10 NV 2 x 10                  Supine:           - Heel props   3 mins NV        - quad sets   10 x 10" 10 x 10"  5"x20 10 x 10"    - SLR flexion           - heel slides with strap   2 mins x 6" hold    2 mins x 6" hold                                Modalities  1/24 1/27                   Ice post 10 [place]

## 2020-03-06 ENCOUNTER — OFFICE VISIT (OUTPATIENT)
Dept: OBGYN CLINIC | Facility: CLINIC | Age: 60
End: 2020-03-06

## 2020-03-06 VITALS
HEIGHT: 59 IN | SYSTOLIC BLOOD PRESSURE: 130 MMHG | HEART RATE: 75 BPM | WEIGHT: 167 LBS | BODY MASS INDEX: 33.67 KG/M2 | DIASTOLIC BLOOD PRESSURE: 84 MMHG

## 2020-03-06 DIAGNOSIS — Z47.1 AFTERCARE FOLLOWING RIGHT KNEE JOINT REPLACEMENT SURGERY: ICD-10-CM

## 2020-03-06 DIAGNOSIS — Z96.651 AFTERCARE FOLLOWING RIGHT KNEE JOINT REPLACEMENT SURGERY: ICD-10-CM

## 2020-03-06 DIAGNOSIS — Z96.651 S/P TKR (TOTAL KNEE REPLACEMENT) USING CEMENT, RIGHT: Primary | ICD-10-CM

## 2020-03-06 PROCEDURE — 3079F DIAST BP 80-89 MM HG: CPT | Performed by: ORTHOPAEDIC SURGERY

## 2020-03-06 PROCEDURE — 3008F BODY MASS INDEX DOCD: CPT | Performed by: ORTHOPAEDIC SURGERY

## 2020-03-06 PROCEDURE — 1111F DSCHRG MED/CURRENT MED MERGE: CPT | Performed by: ORTHOPAEDIC SURGERY

## 2020-03-06 PROCEDURE — 99024 POSTOP FOLLOW-UP VISIT: CPT | Performed by: ORTHOPAEDIC SURGERY

## 2020-03-06 PROCEDURE — 3075F SYST BP GE 130 - 139MM HG: CPT | Performed by: ORTHOPAEDIC SURGERY

## 2020-03-06 NOTE — PROGRESS NOTES
Assessment/Plan:  1  S/P TKR (total knee replacement) using cement, right     2  Aftercare following right knee joint replacement surgery       Scribe Attestation    I,:   Angie Zhang am acting as a scribe while in the presence of the attending physician :        I,:   Carisa Perera, DO personally performed the services described in this documentation    as scribed in my presence :          Shar Banegas is a very pleasant 61-year-old female who returns today for follow-up evaluation six weeks status post right total knee arthroplasty  I am very pleased with her clinical presentation today in the office and her improved range of motion  At this time, she may discontinue aspirin for DVT prophylaxis  She should continue with formal physical therapy but may be discharged to a home exercise program as appropriate  I encouraged her to continue her gradual return to activity as desired  We will see her back in six weeks for repeat clinical evaluation with x-rays on arrival     Subjective: Follow up evaluation 6 weeks status post right total knee arthroplasty    Patient ID: Isaías Pimentel is a 64 y o  female  HPI  Shar Banegas is a pleasant 61-year-old female who returns today for follow-up evaluation six weeks status post right total knee arthroplasty  She states that she has been doing very well  She does still experience some mild aching soreness with activity, mostly after performing her therapy exercises  She denies any significant pain otherwise and is pleased with the resolution of her pain with walking and at night that she experienced preoperatively  She states that she is gradually returning to activity and has been able to do so without issue  She has been utilizing aspirin for DVT prophylaxis  She denies any new injury or trauma  She denies any distal paresthesias of the lower extremity  Review of Systems   Constitutional: Positive for activity change   Negative for chills, fever and unexpected weight change  HENT: Negative for hearing loss, nosebleeds and sore throat  Eyes: Negative for pain, redness and visual disturbance  Respiratory: Negative for cough, shortness of breath and wheezing  Cardiovascular: Negative for chest pain, palpitations and leg swelling  Gastrointestinal: Negative for abdominal pain, nausea and vomiting  Endocrine: Negative for polydipsia and polyuria  Genitourinary: Negative for dysuria and hematuria  Musculoskeletal: Positive for arthralgias and myalgias  Negative for joint swelling  See HPI   Skin: Negative for rash and wound  Neurological: Negative for dizziness, numbness and headaches  Psychiatric/Behavioral: Negative for decreased concentration and suicidal ideas  The patient is not nervous/anxious  Past Medical History:   Diagnosis Date    Seasonal allergies     Sinus congestion     developed nasal congestion and cough 19- is going to call PCP today for RX; advised to call surgeon if not better next week      Skin lesion of left lower extremity 2019    Torn medial meniscus     2016  last assessed       Past Surgical History:   Procedure Laterality Date     SECTION      x2    COLONOSCOPY      KNEE ARTHROSCOPY, MEDIAL PATELLO FEMORAL LIGAMENT REPAIR      patellar closed    KNEE SURGERY      with medial meniscus repair    GA TOTAL KNEE ARTHROPLASTY Right 2020    Procedure: ARTHROPLASTY KNEE TOTAL;  Surgeon: Ishmael Person DO;  Location: [facility];  Service: Orthopedics    REPLACEMENT TOTAL KNEE         Family History   Problem Relation Age of Onset    Hypertension Mother     No Known Problems Father     Colon cancer Maternal Grandfather [de-identified]    No Known Problems Daughter     No Known Problems Maternal Grandmother     No Known Problems Paternal Grandmother     Pancreatic cancer Maternal Aunt 76    No Known Problems Daughter     No Known Problems Paternal Aunt     Breast cancer Neg Hx        Social History     Occupational History    Not on file   Tobacco Use    Smoking status: Never Smoker    Smokeless tobacco: Never Used   Substance and Sexual Activity    Alcohol use: Yes     Frequency: Monthly or less     Drinks per session: 1 or 2     Binge frequency: Never     Comment: social drinker in all scripts    Drug use: No    Sexual activity: Not on file         Current Outpatient Medications:     ALPRAZolam (XANAX) 0 25 mg tablet, Take 1 tablet (0 25 mg total) by mouth daily as needed for anxiety, Disp: 30 tablet, Rfl: 0    amLODIPine (NORVASC) 2 5 mg tablet, Take 1 tablet (2 5 mg total) by mouth daily, Disp: 30 tablet, Rfl: 1    Naproxen Sodium (ALEVE PO), Take by mouth, Disp: , Rfl:     acetaminophen (TYLENOL) 325 mg tablet, Take 3 tablets (975 mg total) by mouth every 8 (eight) hours (Patient not taking: Reported on 3/6/2020), Disp: 30 tablet, Rfl: 0    alendronate (FOSAMAX) 70 mg tablet, Take 1 tablet (70 mg total) by mouth every 7 days (Patient not taking: Reported on 3/6/2020), Disp: 4 tablet, Rfl: 5    aspirin 325 mg tablet, Take 1 tablet (325 mg total) by mouth 2 (two) times a day (Patient not taking: Reported on 3/6/2020), Disp: 84 tablet, Rfl: 0    estradiol (ESTRACE) 0 1 mg/g vaginal cream, Insert 1 g into the vagina 3 (three) times a week (Patient not taking: Reported on 3/6/2020), Disp: 42 5 g, Rfl: 3    fluticasone (FLONASE) 50 mcg/act nasal spray, 1 spray into each nostril 2 (two) times a day, Disp: , Rfl:     omeprazole (PriLOSEC) 20 mg delayed release capsule, Take 20 mg by mouth daily, Disp: , Rfl:     No Known Allergies    Objective:  Vitals:    03/06/20 1130   BP: 130/84   Pulse: 75       Body mass index is 33 73 kg/m²  Right Knee Exam     Tenderness   The patient is experiencing no tenderness  Range of Motion   Right knee extension: 2 passive  Right knee flexion: 120       Tests   Varus: negative Valgus: negative  Drawer:  Anterior - negative    Posterior - negative    Other   Erythema: absent  Scars: present (well healed anterior operative incision)  Sensation: normal  Pulse: present  Swelling: none  Effusion: no effusion present    Comments:  Stable at 0, 30 and 90°  Neurovascularly intact distally  No warmth, erythema or signs of infection  Patella tracks midline and flat          Observations     Right Knee   Negative for effusion  Physical Exam   Constitutional: She is oriented to person, place, and time  She appears well-developed and well-nourished  HENT:   Head: Normocephalic and atraumatic  Eyes: Pupils are equal, round, and reactive to light  Conjunctivae are normal    Neck: Normal range of motion  Neck supple  Cardiovascular: Normal rate and intact distal pulses  Pulmonary/Chest: Effort normal  No respiratory distress  Musculoskeletal:        Right knee: She exhibits no effusion  As noted in HPI   Neurological: She is alert and oriented to person, place, and time  Skin: Skin is warm and dry  Psychiatric: She has a normal mood and affect  Her behavior is normal    Nursing note and vitals reviewed

## 2020-03-10 ENCOUNTER — APPOINTMENT (OUTPATIENT)
Dept: PHYSICAL THERAPY | Facility: CLINIC | Age: 60
End: 2020-03-10
Payer: COMMERCIAL

## 2020-03-10 ENCOUNTER — OFFICE VISIT (OUTPATIENT)
Dept: PHYSICAL THERAPY | Facility: CLINIC | Age: 60
End: 2020-03-10
Payer: COMMERCIAL

## 2020-03-10 DIAGNOSIS — G89.29 CHRONIC PAIN OF LEFT KNEE: ICD-10-CM

## 2020-03-10 DIAGNOSIS — Z96.651 S/P TKR (TOTAL KNEE REPLACEMENT) USING CEMENT, RIGHT: ICD-10-CM

## 2020-03-10 DIAGNOSIS — M17.11 PRIMARY OSTEOARTHRITIS OF RIGHT KNEE: Primary | ICD-10-CM

## 2020-03-10 DIAGNOSIS — M25.562 CHRONIC PAIN OF LEFT KNEE: ICD-10-CM

## 2020-03-10 PROCEDURE — 97140 MANUAL THERAPY 1/> REGIONS: CPT | Performed by: PHYSICAL THERAPIST

## 2020-03-10 PROCEDURE — 97110 THERAPEUTIC EXERCISES: CPT | Performed by: PHYSICAL THERAPIST

## 2020-03-10 RX ORDER — LORATADINE 10 MG/1
10 TABLET ORAL DAILY
COMMUNITY
End: 2022-02-18

## 2020-03-10 NOTE — PROGRESS NOTES
PT Re-Evaluation  and PT Discharge    Today's date: 3/10/2020  Patient name: Cesar Richter  : 1960  MRN: [id number]  Referring provider: Estefani Dhaliwal DO  Dx:   Encounter Diagnosis     ICD-10-CM    1  Primary osteoarthritis of right knee M17 11    2  S/P TKR (total knee replacement) using cement, right Z96 651    3  Chronic pain of left knee M25 562     G89 29                   Assessment  Assessment details: Cesar Ricther is a pleasant 64 y o  presenting to physical therapy with MD referral for S/P TKR (total knee replacement) using cement, right  (primary encounter diagnosis), Primary osteoarthritis of right knee, and Chronic pain of left knee  Since time of initial evaluation at this clinic 30 days ago, pt has made good progress in balance, lower extremity strength/flexibility, and improvement in knee A/PROM  It is recommended that pt continue with skilled PT until she gains full knee extension; however, pt would like to make today her last visit and transition to an independent HEP due to financial concerns  Educated pt on the wellness program offered at our location to allow her to continue to utilize the equipment  Educated pt on the importance of continuing with her HEP and in regaining her knee ROM  Barriers to therapy: Lower back pain, only attended 2 PT session since surgery on 20  Understanding of Dx/Px/POC: good   Prognosis: good    Goals  ST WEEKS  1  Decrease pain by 2 points on VAS at its worst  MET  2  Increase knee ROM 0-90 PARTIALLY MET  3  Increase LE by 1/2 MMT grade in all deficient planes  MET  4  Pt will be able to perform 10 SLR flexion without quad lag in 4 weeks  MET    LT-8 WEEKS  1  Patient to be independent with a/iadls  MET  2  Ambulation with/without appropriate assistive device for functional distances in home and in the community  MET  3  Independent with HEP ongoing throughout POC MET   4  AROM 0- 115 or greater  NOT MET  5   Pain <3/10 at its worst  NOT MET    Plan  Frequency: 2-3x week  Treatment plan discussed with: patient        Subjective Evaluation    History of Present Illness  Date of onset: 2020  Date of surgery: 2020  Mechanism of injury: Premorbid status:  - ADLs: Independent with moderate difficulty  - Work: Not a working individual- laid off in November  - Recreation: works on mini farm    Current status:  - ADLs/Functional activities:   - Stairs Reciprocal pattern with Pain Levels: no pain at a slow pace   - Sit to stand with occasional mild pain after prolonged sitting with use of BUEs   - Walking around [place] with no discomfort   - Standing 60 minutes with no significant issues   - Sleeping with 1-2 weekly sleep disturbances due to pain   - Run/Bike/Jump  - Work: Currently laid off- (prolonged sitting)  - Recreation: none    Since time of initial evaluation, functionally, pt feels as though she is back to 70% of her premorbid status  Pt continues to lack functional endurance 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 and maintain her property  At this point in time, pt would like to be discharged to an independent HEP due to financial concerns  Per pt, MD is aware that pt will be transitioning to HEP only and discontinuing PT    Quality of life: good    Pain  Current pain ratin  At best pain ratin  At worst pain ratin  Location: medial aspect of right knee and suprior to patella- burning over anterior knee  Quality: burning, tight and dull ache  Relieving factors: medications, rest and heat  Aggravating factors: standing and walking  Progression: improved    Social Support  Stairs in house: yes (5)   Lives in: multiple-level home  Lives with: spouse and adult children    Hand dominance: right    Patient Goals  Patient goals for therapy: decreased pain, decreased edema, increased motion, increased strength and independence with ADLs/IADLs  Patient goal: walk without walker; heal        Objective     Observations Additional Observation Details  Incision healing well with no signs of infection present      Neurological Testing     Sensation     Knee   Left Knee   Intact: light touch    Right Knee   Intact: light touch     Active Range of Motion   Left Knee   Hyperextension  Flexion: 135 degrees   Extension: -2 degrees     Right Knee   Flexion: 114 degrees   Extension: 5 degrees     Additional Active Range of Motion Details  Lacking 5 degrees from full extension on R    Passive Range of Motion   Left Knee   Normal passive range of motion    Right Knee   Flexion: 115 degrees   Extension: 2 degrees     Additional Passive Range of Motion Details  Lacking 2 degrees from full extension on R    Mobility   Patellar Mobility:   Left Knee   Hypermobile: left medial, left lateral, left superior and left inferior      Right Knee   WFL: medial and lateral  Hypomobile: superior and inferior     Strength/Myotome Testing     Left Hip   Planes of Motion   Flexion: 4+    Right Hip   Planes of Motion   Flexion: 4+    Left Knee   Normal strength  Flexion: 5  Extension: 5    Right Knee   Flexion: 4  Extension: 4+    Left Ankle/Foot   Dorsiflexion: 5  Plantar flexion: 5    Right Ankle/Foot   Dorsiflexion: 5  Plantar flexion: 5    Additional Strength Details  SLS L: 7 seconds  SLS R: 30 seconds    Flexibility:  - HS: minimal restriction B  - Gastroc: mild restriction on R, minimal on L    Ambulation   Weight-Bearing Status   Weight-Bearing Status (Right): weight-bearing as tolerated    Assistive device used: rollator walker    Ambulation: Level Surfaces   Ambulation with assistive device: independent  Ambulation without assistive device: unable    Ambulation: Stairs   Ascend stairs: contact guard assist (with crutch)  Pattern: non-reciprocal  Railings: two rails  Descend stairs: contact guard assist  Pattern: non-reciprocal  Railings: one rail    Functional Assessment        Comments  Shower seat and leg protector  Walker for ambulation  No work  No drive  Assist with IADLs              Precautions: L partial knee replacement, LBP        Manual  1/24 1/27  2-11 (RE)  2-13  2/28  3/3  3-5  3-10 (RE)       R LE/knee   10                                                                   PROM k' flexion   70                                           Patellar mobilizations sup/inf     JG   JG   JG         Tibiofemoral joint mobilizations A/P     JG     JF JG  JG        PROM with OP into knee flex/ext     JG    SC  JF Lovina Dony             Exercise Diary  1/24 1/27  2-11 (RE)  2-13 2/28  3/3  3-5  3-10 (RE)   BP           130/92 151/102 mmHG, HR: 82bpm  136/90 mmHG   Prone knee hang          3 min                               Recumbent bike     6 mins NV  6 mins  6 min     10 mins  10 mins                        Standing:                    - rockerboard gastroc stretch      standing at wall 4 x 30"  4 x 30"    4x30" SB  4 x 30"  4 x 30"   - SLR 3 ways     2 x 10 ea NV 2 x 10 ea   2 x 10 ea 2 x 10 ea     - front step ups           6" x20 2 x 10 6" step     - lateral step ups           6"x20 2 x 10 6" step     - mini squats           2x10 2 x 10                         - SLS                   - Lateral cone walks                                                           Seated:                   - HS stretch (pressure over distal femur into knee extension)     4 x 30" NV 4 x 30           - LAQ (90-40 deg)     2 x 10 NV 2 x 10                               Supine:                   - Heel props     3 mins NV             - quad sets     10 x 10" 10 x 10"   5"x20 10 x 10"  10 x 10"   - SLR flexion                   - heel slides with strap     2 mins x 6" hold       2 mins x 6" hold  2 mins x 6" hold                                                 Modalities  1/24 1/27                   Ice post 10 home                                                                     * 1:1 time from 12:05pm- 12:30pm

## 2020-03-10 NOTE — PROGRESS NOTES
Daily Note     Today's date: 3/10/2020  Patient name: Erick Santoro  : 1960  MRN: [id number]  Referring provider: Veda Vieira DO  Dx: No diagnosis found  Subjective: ***      Objective: See treatment diary below      Assessment: Tolerated treatment {Tolerated treatment :8418294495}   Patient {assessment:5541323230}      Plan: {PLAN:6388070508}     Precautions: L partial knee replacement, LBP        Manual    2-11 (RE)  -13  2/28  3/3  3-5         R LE/knee   10                                                                   PROM k' flexion   70                                Patellar mobilizations sup/inf   JG  JG  JG      Tibiofemoral joint mobilizations A/P   JG   JF JG       PROM with OP into knee flex/ext     JG    SC  JF  JG              Exercise Diary    2-11 (RE)  2-13  2/28  3/3  3-5     BP      130/92 151/102 mmHG, HR: 82bpm    Prone knee hang      3 min                   Recumbent bike     6 mins NV  6 mins  6 min     10 mins                          Standing:                    - rockerboard gastroc stretch      standing at wall 4 x 30"  4 x 30"    4x30" SB  4 x 30"     - SLR 3 ways   2 x 10 ea NV 2 x 10 ea  2 x 10 ea 2 x 10 ea    - front step ups      6" x20 2 x 10 6" step    - lateral step ups      6"x20 2 x 10 6" step    - mini squats      2x10 2 x 10               - SLS           - Lateral cone walks                                 Seated:           - HS stretch (pressure over distal femur into knee extension)   4 x 30" NV 4 x 30       - LAQ (90-40 deg)   2 x 10 NV 2 x 10                  Supine:           - Heel props   3 mins NV        - quad sets   10 x 10" 10 x 10"  5"x20 10 x 10"    - SLR flexion           - heel slides with strap   2 mins x 6" hold    2 mins x 6" hold                                Modalities                     Ice post 10 home

## 2020-03-12 ENCOUNTER — APPOINTMENT (OUTPATIENT)
Dept: PHYSICAL THERAPY | Facility: CLINIC | Age: 60
End: 2020-03-12
Payer: COMMERCIAL

## 2020-03-16 ENCOUNTER — APPOINTMENT (OUTPATIENT)
Dept: PHYSICAL THERAPY | Facility: CLINIC | Age: 60
End: 2020-03-16
Payer: COMMERCIAL

## 2020-03-17 ENCOUNTER — APPOINTMENT (OUTPATIENT)
Dept: PHYSICAL THERAPY | Facility: CLINIC | Age: 60
End: 2020-03-17
Payer: COMMERCIAL

## 2020-03-18 ENCOUNTER — TELEPHONE (OUTPATIENT)
Dept: OBGYN CLINIC | Facility: CLINIC | Age: 60
End: 2020-03-18

## 2020-03-18 DIAGNOSIS — F41.8 SITUATIONAL ANXIETY: ICD-10-CM

## 2020-03-18 RX ORDER — ALPRAZOLAM 0.25 MG/1
0.25 TABLET ORAL DAILY PRN
Qty: 30 TABLET | Refills: 0 | Status: SHIPPED | OUTPATIENT
Start: 2020-03-18 | End: 2021-04-27 | Stop reason: ALTCHOICE

## 2020-03-18 NOTE — TELEPHONE ENCOUNTER
Patient called concerned that she "overdid it" at last physical therapy apt  She doesn't want to hit the "panic button" but she wants to make sure she didn't do any damage to herself because she is having a really difficult time  She states that she is more stiff and feels a burning around her incision  Requesting to speak with Dr Yoselin Daniels

## 2020-03-18 NOTE — TELEPHONE ENCOUNTER
Call patient at the time of this note  Per her description, she suffered a quadriceps tendon strain  She was doing multiple errands and exercises in the days preceding her last physical therapy session  When stretching on the bike at PT, she felt discomfort in the superior aspect of her knee replacement  She was still able to participate in therapy  She does have burning in the quadriceps tendon with increased activity, but is able to perform all activities of daily living  I discussed that she should ice, rest, elevate, and avoid her home exercise program next few days  If her symptoms persist over the weekends, she can call and we can consider a re-evaluation in the office, but currently it does not sound like she did anything serious  She expressed understanding and appreciation  All of her questions were addressed

## 2020-03-19 ENCOUNTER — APPOINTMENT (OUTPATIENT)
Dept: PHYSICAL THERAPY | Facility: CLINIC | Age: 60
End: 2020-03-19
Payer: COMMERCIAL

## 2020-03-23 ENCOUNTER — APPOINTMENT (OUTPATIENT)
Dept: PHYSICAL THERAPY | Facility: CLINIC | Age: 60
End: 2020-03-23
Payer: COMMERCIAL

## 2020-03-24 ENCOUNTER — OFFICE VISIT (OUTPATIENT)
Dept: OBGYN CLINIC | Facility: CLINIC | Age: 60
End: 2020-03-24

## 2020-03-24 VITALS — WEIGHT: 167 LBS | HEIGHT: 59 IN | BODY MASS INDEX: 33.67 KG/M2

## 2020-03-24 DIAGNOSIS — Z96.651 AFTERCARE FOLLOWING RIGHT KNEE JOINT REPLACEMENT SURGERY: ICD-10-CM

## 2020-03-24 DIAGNOSIS — Z96.651 S/P TKR (TOTAL KNEE REPLACEMENT) USING CEMENT, RIGHT: ICD-10-CM

## 2020-03-24 DIAGNOSIS — M76.899 QUADRICEPS TENDONITIS: Primary | ICD-10-CM

## 2020-03-24 DIAGNOSIS — Z47.1 AFTERCARE FOLLOWING RIGHT KNEE JOINT REPLACEMENT SURGERY: ICD-10-CM

## 2020-03-24 PROCEDURE — 3008F BODY MASS INDEX DOCD: CPT | Performed by: ORTHOPAEDIC SURGERY

## 2020-03-24 PROCEDURE — 3075F SYST BP GE 130 - 139MM HG: CPT | Performed by: ORTHOPAEDIC SURGERY

## 2020-03-24 PROCEDURE — 99024 POSTOP FOLLOW-UP VISIT: CPT | Performed by: ORTHOPAEDIC SURGERY

## 2020-03-24 PROCEDURE — 3079F DIAST BP 80-89 MM HG: CPT | Performed by: ORTHOPAEDIC SURGERY

## 2020-03-24 PROCEDURE — 3008F BODY MASS INDEX DOCD: CPT | Performed by: FAMILY MEDICINE

## 2020-03-24 NOTE — PROGRESS NOTES
Assessment/Plan:  1  Quadriceps tendonitis     2  S/P TKR (total knee replacement) using cement, right     3  Aftercare following right knee joint replacement surgery       Johnathon Rangel is a pleasant 64year old female presenting for increased pain 9 weeks after undergoing a right total knee arthroplasty  In reviewing her history and exam, she has sustained a right quadriceps tendon strain from overuse  We discussed that it is normal to have discomfort at this stage postoperatively  We do not have any concern for a quadriceps tendon rupture or partial tear since her range of motion is unchanged and she has 5/5 strength with resisted knee extension  We encouraged her to continue with conservative treatment and her home exercise program   We will plan to see her back at her regularly scheduled three-month postoperative appointment, at which time she will need x-rays on arrival of her right knee  She expressed understanding all of her questions were addressed today      Subjective: Interval check a of 9 weeks status post right TKA    Patient ID: Terell Coughlin is a 64 y o  female  Johnathon Rangel is a 62-year-old female presenting today for interval follow-up approximately 9 weeks after undergoing a right total knee arthroplasty  She had done increased activity at home and after her last physical therapy session felt a strain in the superior part of her knee  This has continued over the weekend despite rest   She is able to do activities of daily living, but does have discomfort in the medial and superior aspect of the knee  She denies any feelings of instability  Review of Systems   Constitutional: Negative  HENT: Negative  Eyes: Negative  Respiratory: Negative  Cardiovascular: Negative  Gastrointestinal: Negative  Endocrine: Negative  Genitourinary: Negative  Musculoskeletal: Positive for arthralgias, joint swelling and myalgias  Skin: Negative  Allergic/Immunologic: Negative  Neurological: Negative  Hematological: Negative  Psychiatric/Behavioral: Negative  Past Medical History:   Diagnosis Date    Seasonal allergies     Sinus congestion     developed nasal congestion and cough 19- is going to call PCP today for RX; advised to call surgeon if not better next week      Skin lesion of left lower extremity 2019    Torn medial meniscus     2016  last assessed       Past Surgical History:   Procedure Laterality Date     SECTION      x2    COLONOSCOPY      KNEE ARTHROSCOPY, MEDIAL PATELLO FEMORAL LIGAMENT REPAIR      patellar closed    KNEE SURGERY      with medial meniscus repair    [place] TOTAL KNEE ARTHROPLASTY Right 2020    Procedure: ARTHROPLASTY KNEE TOTAL;  Surgeon: Mike Lewis DO;  Location: [facility];  Service: Orthopedics    REPLACEMENT TOTAL KNEE         Family History   Problem Relation Age of Onset    Hypertension Mother     No Known Problems Father     Colon cancer Maternal Grandfather [de-identified]    No Known Problems Daughter     No Known Problems Maternal Grandmother     No Known Problems Paternal Grandmother     Pancreatic cancer Maternal Aunt 76    No Known Problems Daughter     No Known Problems Paternal Aunt     Breast cancer Neg Hx        Social History     Occupational History    Not on file   Tobacco Use    Smoking status: Never Smoker    Smokeless tobacco: Never Used   Substance and Sexual Activity    Alcohol use: Yes     Frequency: Monthly or less     Drinks per session: 1 or 2     Binge frequency: Never     Comment: social drinker in all scripts    Drug use: No    Sexual activity: Not on file         Current Outpatient Medications:     acetaminophen (TYLENOL) 325 mg tablet, Take 3 tablets (975 mg total) by mouth every 8 (eight) hours (Patient not taking: Reported on 3/6/2020), Disp: 30 tablet, Rfl: 0    alendronate (FOSAMAX) 70 mg tablet, Take 1 tablet (70 mg total) by mouth every 7 days (Patient not taking: Reported on 3/6/2020), Disp: 4 tablet, Rfl: 5    ALPRAZolam (XANAX) 0 25 mg tablet, Take 1 tablet (0 25 mg total) by mouth daily as needed for anxiety, Disp: 30 tablet, Rfl: 0    amLODIPine (NORVASC) 2 5 mg tablet, Take 1 tablet (2 5 mg total) by mouth daily, Disp: 30 tablet, Rfl: 1    aspirin 325 mg tablet, Take 1 tablet (325 mg total) by mouth 2 (two) times a day (Patient not taking: Reported on 3/6/2020), Disp: 84 tablet, Rfl: 0    estradiol (ESTRACE) 0 1 mg/g vaginal cream, Insert 1 g into the vagina 3 (three) times a week (Patient not taking: Reported on 3/6/2020), Disp: 42 5 g, Rfl: 3    fluticasone (FLONASE) 50 mcg/act nasal spray, 1 spray into each nostril 2 (two) times a day, Disp: , Rfl:     loratadine (CLARITIN) 10 mg tablet, Take 10 mg by mouth daily, Disp: , Rfl:     Naproxen Sodium (ALEVE PO), Take by mouth, Disp: , Rfl:     omeprazole (PriLOSEC) 20 mg delayed release capsule, Take 20 mg by mouth daily, Disp: , Rfl:     No Known Allergies    Objective:  Vitals: Body mass index is 33 73 kg/m²  Right Knee Exam     Muscle Strength   The patient has normal right knee strength  Tenderness   The patient is experiencing tenderness in the MCL (quad tendon)  Range of Motion   Extension:  0 normal   Flexion:  120 normal     Tests   Varus: negative Valgus: negative  Drawer:  Anterior - negative    Posterior - negative    Other   Erythema: absent  Scars: present (well healed anterior operative incision)  Sensation: normal  Pulse: present  Swelling: mild  Effusion: no effusion present    Comments:  Stable at 0, 30 and 90°  Neurovascularly intact distally  No warmth, erythema or signs of infection  Patella tracks midline and flat  Tender MCL and quad tendon  5/5 resisted extension  Pain end range of flexion  Calf nontender          Observations     Right Knee   Negative for effusion  Physical Exam   Constitutional: She is oriented to person, place, and time   She appears well-developed and well-nourished  There is no height or weight on file to calculate BMI  HENT:   Head: Normocephalic and atraumatic  Eyes: EOM are normal    Neck: Normal range of motion  Cardiovascular: Intact distal pulses  Pulmonary/Chest: Effort normal    Musculoskeletal:        Right knee: She exhibits no effusion  See ortho exam   Neurological: She is alert and oriented to person, place, and time  Skin: Skin is warm and dry  Psychiatric: She has a normal mood and affect  Her behavior is normal  Judgment and thought content normal    Nursing note and vitals reviewed

## 2020-03-26 ENCOUNTER — APPOINTMENT (OUTPATIENT)
Dept: PHYSICAL THERAPY | Facility: CLINIC | Age: 60
End: 2020-03-26
Payer: COMMERCIAL

## 2020-03-27 ENCOUNTER — TELEPHONE (OUTPATIENT)
Dept: FAMILY MEDICINE CLINIC | Facility: CLINIC | Age: 60
End: 2020-03-27

## 2020-03-27 ENCOUNTER — TELEPHONE (OUTPATIENT)
Dept: OTHER | Facility: OTHER | Age: 60
End: 2020-03-27

## 2020-03-27 DIAGNOSIS — J01.00 ACUTE NON-RECURRENT MAXILLARY SINUSITIS: Primary | ICD-10-CM

## 2020-03-27 RX ORDER — FLUTICASONE PROPIONATE 50 MCG
1 SPRAY, SUSPENSION (ML) NASAL DAILY
Qty: 1 BOTTLE | Refills: 1 | Status: SHIPPED | OUTPATIENT
Start: 2020-03-27 | End: 2022-02-18

## 2020-03-27 RX ORDER — AMOXICILLIN AND CLAVULANATE POTASSIUM 875; 125 MG/1; MG/1
1 TABLET, FILM COATED ORAL EVERY 12 HOURS SCHEDULED
Qty: 10 TABLET | Refills: 0 | Status: SHIPPED | OUTPATIENT
Start: 2020-03-27 | End: 2020-04-01

## 2020-03-27 NOTE — TELEPHONE ENCOUNTER
She is starting with a sinus infection again and was wondering if you can send the same medication in again from her last visit in December    [facility]

## 2020-03-30 ENCOUNTER — APPOINTMENT (OUTPATIENT)
Dept: PHYSICAL THERAPY | Facility: CLINIC | Age: 60
End: 2020-03-30
Payer: COMMERCIAL

## 2020-04-20 ENCOUNTER — TELEPHONE (OUTPATIENT)
Dept: OBGYN CLINIC | Facility: CLINIC | Age: 60
End: 2020-04-20

## 2020-06-24 ENCOUNTER — TRANSCRIBE ORDERS (OUTPATIENT)
Dept: MAMMOGRAPHY | Facility: CLINIC | Age: 60
End: 2020-06-24

## 2020-06-24 DIAGNOSIS — Z12.31 OTHER SCREENING MAMMOGRAM: Primary | ICD-10-CM

## 2020-06-29 ENCOUNTER — HOSPITAL ENCOUNTER (OUTPATIENT)
Dept: MAMMOGRAPHY | Facility: CLINIC | Age: 60
Discharge: HOME | End: 2020-06-29
Payer: COMMERCIAL

## 2020-06-29 VITALS — HEIGHT: 59 IN | BODY MASS INDEX: 33.67 KG/M2 | WEIGHT: 167 LBS

## 2020-06-29 DIAGNOSIS — Z12.31 OTHER SCREENING MAMMOGRAM: ICD-10-CM

## 2020-06-29 PROCEDURE — 77063 BREAST TOMOSYNTHESIS BI: CPT

## 2020-06-29 PROCEDURE — 77067 SCR MAMMO BI INCL CAD: CPT

## 2020-09-21 ENCOUNTER — TELEPHONE (OUTPATIENT)
Dept: OBGYN CLINIC | Facility: CLINIC | Age: 60
End: 2020-09-21

## 2020-09-21 NOTE — TELEPHONE ENCOUNTER
Patient has to have emergency Dental Work  Her dentist prescribed her Amoxicilliin yesterday 500 mg 4 xs a day  She is wondering since she started to take this yesterday would this be okay  States she needs emergency dental work done  Patient needs a letter of clearance to have dental surgery   Needs to be faxed to [phone] Att: [address]

## 2020-09-21 NOTE — TELEPHONE ENCOUNTER
If she need emergency dental work, then by all means she should go ahead with it  From our perspective, she just needs 2g of amoxicillin (four 500mg tablets) one hour before any procedure  No other restrictions   Thanks

## 2020-12-04 ENCOUNTER — TELEMEDICINE (OUTPATIENT)
Dept: FAMILY MEDICINE CLINIC | Facility: CLINIC | Age: 60
End: 2020-12-04
Payer: COMMERCIAL

## 2020-12-04 DIAGNOSIS — B34.9 VIRAL INFECTION, UNSPECIFIED: Primary | ICD-10-CM

## 2020-12-04 DIAGNOSIS — B34.9 VIRAL INFECTION, UNSPECIFIED: ICD-10-CM

## 2020-12-04 PROCEDURE — 87637 SARSCOV2&INF A&B&RSV AMP PRB: CPT | Performed by: FAMILY MEDICINE

## 2020-12-04 PROCEDURE — 99213 OFFICE O/P EST LOW 20 MIN: CPT | Performed by: FAMILY MEDICINE

## 2020-12-04 PROCEDURE — 1036F TOBACCO NON-USER: CPT | Performed by: FAMILY MEDICINE

## 2020-12-08 LAB
FLUAV RNA NPH QL NAA+PROBE: NOT DETECTED
FLUBV RNA NPH QL NAA+PROBE: NOT DETECTED
RSV RNA NPH QL NAA+PROBE: NOT DETECTED
SARS-COV-2 RNA NPH QL NAA+PROBE: NOT DETECTED

## 2021-04-13 DIAGNOSIS — Z23 ENCOUNTER FOR IMMUNIZATION: ICD-10-CM

## 2021-04-27 ENCOUNTER — TELEMEDICINE (OUTPATIENT)
Dept: FAMILY MEDICINE CLINIC | Facility: CLINIC | Age: 61
End: 2021-04-27
Payer: COMMERCIAL

## 2021-04-27 VITALS — WEIGHT: 170 LBS | HEIGHT: 59 IN | BODY MASS INDEX: 34.27 KG/M2 | TEMPERATURE: 98.6 F

## 2021-04-27 DIAGNOSIS — J01.01 ACUTE RECURRENT MAXILLARY SINUSITIS: Primary | ICD-10-CM

## 2021-04-27 PROCEDURE — 99213 OFFICE O/P EST LOW 20 MIN: CPT | Performed by: FAMILY MEDICINE

## 2021-04-27 PROCEDURE — 1036F TOBACCO NON-USER: CPT | Performed by: FAMILY MEDICINE

## 2021-04-27 RX ORDER — AMOXICILLIN AND CLAVULANATE POTASSIUM 875; 125 MG/1; MG/1
1 TABLET, FILM COATED ORAL EVERY 12 HOURS SCHEDULED
Qty: 14 TABLET | Refills: 0 | Status: SHIPPED | OUTPATIENT
Start: 2021-04-27 | End: 2021-05-04

## 2021-04-27 NOTE — PROGRESS NOTES
Virtual Regular Visit      Assessment/Plan:    Problem List Items Addressed This Visit        Respiratory    Acute recurrent maxillary sinusitis - Primary    Relevant Medications    amoxicillin-clavulanate (Augmentin) 875-125 mg per tablet               Reason for visit is   Chief Complaint   Patient presents with    Virtual Regular Visit     sinus    Virtual Regular Visit        Encounter provider Holly Gloria MD    Provider located at [place] [address]  [address]  [address] [place] [id number]      Recent Visits  No visits were found meeting these conditions  Showing recent visits within past 7 days and meeting all other requirements     Today's Visits  Date Type Provider Dept   04/27/21 Telemedicine Holly Gloria MD [address] today's visits and meeting all other requirements     Future Appointments  No visits were found meeting these conditions  Showing future appointments within next 150 days and meeting all other requirements        The patient was identified by name and date of birth  Yumiko Bella was informed that this is a telemedicine visit and that the visit is being conducted through [facility] and patient was informed that this is not a secure, HIPAA-compliant platform  She agrees to proceed     My office door was closed  No one else was in the room  She acknowledged consent and understanding of privacy and security of the video platform  The patient has agreed to participate and understands they can discontinue the visit at any time  Patient is aware this is a billable service  Maty Suresh is a 63 y o  female Sinusitis   Sinus Pain  Patient complains of clear rhinorrhea, congestion, post nasal drip and sinus pressure  Onset of symptoms was a few days ago  Symptoms have been unchanged since that time  She is drinking plenty of fluids  Past history is significant for nothing  Patient is non-smoker           Past Medical History:   Diagnosis Date    Seasonal allergies     Sinus congestion     developed nasal congestion and cough 19- is going to call PCP today for RX; advised to call surgeon if not better next week   Skin lesion of left lower extremity 2019    Torn medial meniscus     2016  last assessed       Past Surgical History:   Procedure Laterality Date     SECTION      x2    COLONOSCOPY      KNEE ARTHROSCOPY, MEDIAL PATELLO FEMORAL LIGAMENT REPAIR      patellar closed    KNEE SURGERY      with medial meniscus repair    [place] TOTAL KNEE ARTHROPLASTY Right 2020    Procedure: ARTHROPLASTY KNEE TOTAL;  Surgeon: Steven Whittaker DO;  Location: [facility];  Service: Orthopedics    REPLACEMENT TOTAL KNEE         Current Outpatient Medications   Medication Sig Dispense Refill    fluticasone (FLONASE) 50 mcg/act nasal spray 1 spray into each nostril daily 1 Bottle 1    loratadine (CLARITIN) 10 mg tablet Take 10 mg by mouth daily      amoxicillin-clavulanate (Augmentin) 875-125 mg per tablet Take 1 tablet by mouth every 12 (twelve) hours for 7 days 14 tablet 0    fluticasone (FLONASE) 50 mcg/act nasal spray 1 spray into each nostril 2 (two) times a day       No current facility-administered medications for this visit  No Known Allergies    Review of Systems   Constitutional: Negative for activity change, appetite change, fatigue and fever  HENT: Positive for congestion, sinus pressure and sinus pain  Negative for ear discharge  Respiratory: Negative for cough and shortness of breath  Cardiovascular: Negative for chest pain and palpitations  Gastrointestinal: Negative for diarrhea and nausea  Musculoskeletal: Negative for arthralgias and back pain  Skin: Negative for color change and rash  Neurological: Negative for dizziness and headaches  Psychiatric/Behavioral: Negative for agitation and behavioral problems         Video Exam    Vitals:    21 0900   Temp: 98 6 °F (37 °C)   Weight: 77 1 kg (170 lb)   Height: 4' 11" (1 499 m)       Physical Exam  Constitutional:       General: She is not in acute distress  Appearance: She is well-developed  HENT:      Head: Normocephalic and atraumatic  Eyes:      Conjunctiva/sclera: Conjunctivae normal    Neck:      Musculoskeletal: Normal range of motion  Pulmonary:      Effort: Pulmonary effort is normal  No respiratory distress  Musculoskeletal: Normal range of motion  Neurological:      Mental Status: She is alert and oriented to person, place, and time  Psychiatric:         Behavior: Behavior normal           I spent 5 minutes directly with the patient during this visit      VIRTUAL VISIT DISCLAIMER    Annie Alejo acknowledges that she has consented to an online visit or consultation  She understands that the online visit is based solely on information provided by her, and that, in the absence of a face-to-face physical evaluation by the physician, the diagnosis she receives is both limited and provisional in terms of accuracy and completeness  This is not intended to replace a full medical face-to-face evaluation by the physician  Annie Alejo understands and accepts these terms

## 2021-06-04 ENCOUNTER — RA CDI HCC (OUTPATIENT)
Dept: OTHER | Facility: HOSPITAL | Age: 61
End: 2021-06-04

## 2021-06-04 NOTE — PROGRESS NOTES
[facility]  coding opportunities          Chart reviewed, no opportunity found: CHART REVIEWED, NO OPPORTUNITY FOUND              Patients insurance company: [employer] (Medicare Advantage and Commercial)

## 2021-06-09 ENCOUNTER — OFFICE VISIT (OUTPATIENT)
Dept: FAMILY MEDICINE CLINIC | Facility: CLINIC | Age: 61
End: 2021-06-09
Payer: COMMERCIAL

## 2021-06-09 ENCOUNTER — APPOINTMENT (OUTPATIENT)
Dept: LAB | Facility: CLINIC | Age: 61
End: 2021-06-09
Payer: COMMERCIAL

## 2021-06-09 VITALS
WEIGHT: 171.6 LBS | HEIGHT: 59 IN | BODY MASS INDEX: 34.6 KG/M2 | HEART RATE: 82 BPM | OXYGEN SATURATION: 97 % | SYSTOLIC BLOOD PRESSURE: 128 MMHG | DIASTOLIC BLOOD PRESSURE: 74 MMHG | TEMPERATURE: 97.7 F

## 2021-06-09 DIAGNOSIS — Z80.0 FAMILY HISTORY OF COLON CANCER: ICD-10-CM

## 2021-06-09 DIAGNOSIS — E53.8 B12 DEFICIENCY: ICD-10-CM

## 2021-06-09 DIAGNOSIS — Z78.0 POSTMENOPAUSAL ESTROGEN DEFICIENCY: ICD-10-CM

## 2021-06-09 DIAGNOSIS — R53.83 OTHER FATIGUE: ICD-10-CM

## 2021-06-09 DIAGNOSIS — M81.0 OSTEOPOROSIS WITHOUT CURRENT PATHOLOGICAL FRACTURE, UNSPECIFIED OSTEOPOROSIS TYPE: ICD-10-CM

## 2021-06-09 DIAGNOSIS — Z13.220 LIPID SCREENING: ICD-10-CM

## 2021-06-09 DIAGNOSIS — D64.9 ANEMIA, UNSPECIFIED TYPE: ICD-10-CM

## 2021-06-09 DIAGNOSIS — Z00.00 ANNUAL PHYSICAL EXAM: Primary | ICD-10-CM

## 2021-06-09 DIAGNOSIS — N95.2 POSTMENOPAUSAL ATROPHIC VAGINITIS: ICD-10-CM

## 2021-06-09 DIAGNOSIS — H61.21 IMPACTED CERUMEN, RIGHT EAR: ICD-10-CM

## 2021-06-09 PROBLEM — J01.01 ACUTE RECURRENT MAXILLARY SINUSITIS: Status: RESOLVED | Noted: 2021-04-27 | Resolved: 2021-06-09

## 2021-06-09 PROBLEM — R11.0 NAUSEA: Status: RESOLVED | Noted: 2020-01-31 | Resolved: 2021-06-09

## 2021-06-09 PROBLEM — R03.0 ELEVATED BLOOD PRESSURE READING: Status: RESOLVED | Noted: 2020-02-19 | Resolved: 2021-06-09

## 2021-06-09 LAB
ALBUMIN SERPL BCP-MCNC: 4.1 G/DL (ref 3.5–5)
ALP SERPL-CCNC: 94 U/L (ref 46–116)
ALT SERPL W P-5'-P-CCNC: 26 U/L (ref 12–78)
ANION GAP SERPL CALCULATED.3IONS-SCNC: 6 MMOL/L (ref 4–13)
AST SERPL W P-5'-P-CCNC: 18 U/L (ref 5–45)
BASOPHILS # BLD AUTO: 0.03 THOUSANDS/ΜL (ref 0–0.1)
BASOPHILS NFR BLD AUTO: 1 % (ref 0–1)
BILIRUB SERPL-MCNC: 0.45 MG/DL (ref 0.2–1)
BUN SERPL-MCNC: 10 MG/DL (ref 5–25)
CALCIUM SERPL-MCNC: 9.4 MG/DL (ref 8.3–10.1)
CHLORIDE SERPL-SCNC: 108 MMOL/L (ref 100–108)
CHOLEST SERPL-MCNC: 213 MG/DL (ref 50–200)
CO2 SERPL-SCNC: 28 MMOL/L (ref 21–32)
CREAT SERPL-MCNC: 0.74 MG/DL (ref 0.6–1.3)
EOSINOPHIL # BLD AUTO: 0.13 THOUSAND/ΜL (ref 0–0.61)
EOSINOPHIL NFR BLD AUTO: 3 % (ref 0–6)
ERYTHROCYTE [DISTWIDTH] IN BLOOD BY AUTOMATED COUNT: 13 % (ref 11.6–15.1)
GFR SERPL CREATININE-BSD FRML MDRD: 89 ML/MIN/1.73SQ M
GLUCOSE P FAST SERPL-MCNC: 89 MG/DL (ref 65–99)
HCT VFR BLD AUTO: 40.4 % (ref 34.8–46.1)
HDLC SERPL-MCNC: 84 MG/DL
HGB BLD-MCNC: 13.1 G/DL (ref 11.5–15.4)
IMM GRANULOCYTES # BLD AUTO: 0.01 THOUSAND/UL (ref 0–0.2)
IMM GRANULOCYTES NFR BLD AUTO: 0 % (ref 0–2)
IRON SERPL-MCNC: 75 UG/DL (ref 50–170)
LDLC SERPL CALC-MCNC: 113 MG/DL (ref 0–100)
LYMPHOCYTES # BLD AUTO: 1.38 THOUSANDS/ΜL (ref 0.6–4.47)
LYMPHOCYTES NFR BLD AUTO: 30 % (ref 14–44)
MCH RBC QN AUTO: 29.4 PG (ref 26.8–34.3)
MCHC RBC AUTO-ENTMCNC: 32.4 G/DL (ref 31.4–37.4)
MCV RBC AUTO: 91 FL (ref 82–98)
MONOCYTES # BLD AUTO: 0.41 THOUSAND/ΜL (ref 0.17–1.22)
MONOCYTES NFR BLD AUTO: 9 % (ref 4–12)
NEUTROPHILS # BLD AUTO: 2.59 THOUSANDS/ΜL (ref 1.85–7.62)
NEUTS SEG NFR BLD AUTO: 57 % (ref 43–75)
NRBC BLD AUTO-RTO: 0 /100 WBCS
PLATELET # BLD AUTO: 254 THOUSANDS/UL (ref 149–390)
PMV BLD AUTO: 11.3 FL (ref 8.9–12.7)
POTASSIUM SERPL-SCNC: 3.8 MMOL/L (ref 3.5–5.3)
PROT SERPL-MCNC: 7.8 G/DL (ref 6.4–8.2)
RBC # BLD AUTO: 4.46 MILLION/UL (ref 3.81–5.12)
SODIUM SERPL-SCNC: 142 MMOL/L (ref 136–145)
TRIGL SERPL-MCNC: 80 MG/DL
TSH SERPL DL<=0.05 MIU/L-ACNC: 2.66 UIU/ML (ref 0.36–3.74)
VIT B12 SERPL-MCNC: 253 PG/ML (ref 100–900)
WBC # BLD AUTO: 4.55 THOUSAND/UL (ref 4.31–10.16)

## 2021-06-09 PROCEDURE — 3725F SCREEN DEPRESSION PERFORMED: CPT | Performed by: FAMILY MEDICINE

## 2021-06-09 PROCEDURE — 84443 ASSAY THYROID STIM HORMONE: CPT

## 2021-06-09 PROCEDURE — 83540 ASSAY OF IRON: CPT

## 2021-06-09 PROCEDURE — 82607 VITAMIN B-12: CPT

## 2021-06-09 PROCEDURE — 36415 COLL VENOUS BLD VENIPUNCTURE: CPT

## 2021-06-09 PROCEDURE — 85025 COMPLETE CBC W/AUTO DIFF WBC: CPT

## 2021-06-09 PROCEDURE — 1036F TOBACCO NON-USER: CPT | Performed by: FAMILY MEDICINE

## 2021-06-09 PROCEDURE — 3008F BODY MASS INDEX DOCD: CPT | Performed by: FAMILY MEDICINE

## 2021-06-09 PROCEDURE — 80053 COMPREHEN METABOLIC PANEL: CPT

## 2021-06-09 PROCEDURE — 80061 LIPID PANEL: CPT

## 2021-06-09 PROCEDURE — 99396 PREV VISIT EST AGE 40-64: CPT | Performed by: FAMILY MEDICINE

## 2021-06-09 RX ORDER — ESTRADIOL 0.1 MG/G
CREAM VAGINAL
Qty: 42.5 G | Refills: 1 | Status: SHIPPED | OUTPATIENT
Start: 2021-06-09 | End: 2022-02-18

## 2021-06-09 NOTE — PROGRESS NOTES
[address] [address] [place] [address]    NAME: Janett Browning  AGE: 63 y o  SEX: female  : 1960     DATE: 2021     Assessment and Plan:     Problem List Items Addressed This Visit        Musculoskeletal and Integument    Osteoporosis without current pathological fracture    Relevant Orders    DXA bone density spine hip and pelvis      Other Visit Diagnoses     Annual physical exam    -  Primary    Family history of colon cancer        Relevant Orders    Ambulatory referral to Gastroenterology    Postmenopausal atrophic vaginitis        Relevant Medications    estradiol (ESTRACE) 0 1 mg/g vaginal cream    Other fatigue        Relevant Orders    CBC and differential    Comprehensive metabolic panel    TSH, 3rd generation with Free T4 reflex    Lipid screening        Relevant Orders    Lipid Panel with Direct LDL reflex    Anemia, unspecified type        Relevant Orders    Iron    B12 deficiency        Relevant Orders    Vitamin B12    Impacted cerumen, right ear        Postmenopausal estrogen deficiency        Relevant Orders    DXA bone density spine hip and pelvis          Immunizations and preventive care screenings were discussed with patient today  Appropriate education was printed on patient's after visit summary  Counseling:  Alcohol/drug use: discussed moderation in alcohol intake, the recommendations for healthy alcohol use, and avoidance of illicit drug use  Dental Health: discussed importance of regular tooth brushing, flossing, and dental visits  · Exercise: the importance of regular exercise/physical activity was discussed  Recommend exercise 3-5 times per week for at least 30 minutes  · Update labs  · Update DEXA  · Update CRC screening  · For insect stings, advised Benadryl, topical cortisone, ice, local care  BMI Counseling: Body mass index is 34 66 kg/m²   The BMI is above normal  Nutrition recommendations include encouraging healthy choices of fruits and vegetables  Exercise recommendations include exercising 3-5 times per week  No pharmacotherapy was ordered  Return in about 1 year (around 6/9/2022) for Annual physical      Chief Complaint:     Chief Complaint   Patient presents with    Well Check      History of Present Illness:     Adult Annual Physical   Patient here for a comprehensive physical exam  The patient reports problems - she had a bee sting and showed me a picture on her phone of a local red area on her body  she says she took Benadryl and it didn't improve much  She wasn't sure what else she could do in the case of a bee sting  Reviewed previous labs - she was anemic in 2020 hemoglobin was 9 7  she takes iron irregularly  She says she has been anemic her whole life  She has not noticed any bleeding or blood in stools  CRC screening is due  She is due for all labs    She asks for Estrace vaginal cream - Ob/Gyn prescribed in the past     She also has a h/o osteoporosis is due for DEXA, she took pills in the past but has not been on anything for at least 6 months  Not taking ca + D  Diet and Physical Activity  · Diet/Nutrition: well balanced diet and consuming 3-5 servings of fruits/vegetables daily  · Exercise: no formal exercise  Works around [employer]  Depression Screening  PHQ-9 Depression Screening    PHQ-9:   Frequency of the following problems over the past two weeks:      Little interest or pleasure in doing things: 0 - not at all  Feeling down, depressed, or hopeless: 0 - not at all  PHQ-2 Score: 0       General Health  · Sleep: sleeps poorly  Wakes up frequently to go to the bathroom and  wakes up to go to the bathroom  · Hearing: no issues  · Vision: goes for regular eye exams and wears glasses  · Dental: regular dental visits and brushes teeth twice daily         /GYN Health  · Patient is: postmenopausal  · Pap 2019  · Mammo 2020     Review of Systems:     Review of Systems Constitutional: Negative for activity change, appetite change, chills, fatigue, fever and unexpected weight change  HENT: Negative for congestion, ear discharge, ear pain, postnasal drip, sinus pressure and sore throat  Eyes: Negative for discharge and visual disturbance  Respiratory: Negative for cough, shortness of breath and wheezing  Cardiovascular: Negative for chest pain, palpitations and leg swelling  Gastrointestinal: Negative for abdominal pain, constipation, diarrhea, nausea and vomiting  Endocrine: Negative for cold intolerance, heat intolerance, polydipsia and polyuria  Genitourinary: Negative for difficulty urinating and frequency  Dryness   Musculoskeletal: Negative for arthralgias, back pain, joint swelling and myalgias  Skin: Negative for rash  Neurological: Negative for dizziness, weakness, light-headedness, numbness and headaches  Hematological: Negative for adenopathy  Psychiatric/Behavioral: Negative for behavioral problems, confusion, dysphoric mood, sleep disturbance and suicidal ideas  The patient is not nervous/anxious  Past Medical History:     Past Medical History:   Diagnosis Date    Seasonal allergies     Sinus congestion     developed nasal congestion and cough 19- is going to call PCP today for RX; advised to call surgeon if not better next week      Skin lesion of left lower extremity 2019    Torn medial meniscus     2016  last assessed      Past Surgical History:     Past Surgical History:   Procedure Laterality Date     SECTION      x2    COLONOSCOPY      KNEE ARTHROSCOPY, MEDIAL PATELLO FEMORAL LIGAMENT REPAIR      patellar closed    KNEE SURGERY      with medial meniscus repair    [place] TOTAL KNEE ARTHROPLASTY Right 2020    Procedure: ARTHROPLASTY KNEE TOTAL;  Surgeon: Jadiel Yuan DO;  Location: [facility];  Service: Orthopedics    REPLACEMENT TOTAL KNEE        Social History:        Social History Socioeconomic History    Marital status: /Civil Union     Spouse name: None    Number of children: None    Years of education: None    Highest education level: None   Occupational History    None   Social Needs    Financial resource strain: None    Food insecurity     Worry: None     Inability: None    Transportation needs     Medical: None     Non-medical: None   Tobacco Use    Smoking status: Never Smoker    Smokeless tobacco: Never Used   Substance and Sexual Activity    Alcohol use: Yes     Frequency: Monthly or less     Drinks per session: 1 or 2     Binge frequency: Never     Comment: social drinker in all scripts    Drug use: No    Sexual activity: None   Lifestyle    Physical activity     Days per week: None     Minutes per session: None    Stress: None   Relationships    Social connections     Talks on phone: None     Gets together: None     Attends Confucianism service: None     Active member of club or organization: None     Attends meetings of clubs or organizations: None     Relationship status: None    Intimate partner violence     Fear of current or ex partner: None     Emotionally abused: None     Physically abused: None     Forced sexual activity: None   Other Topics Concern    None   Social History Narrative    None      Family History:     Family History   Problem Relation Age of Onset    Hypertension Mother     Lymphoma Mother 66    No Known Problems Father     Colon cancer Maternal Grandfather [de-identified]    No Known Problems Daughter     No Known Problems Maternal Grandmother     No Known Problems Paternal Grandmother     Pancreatic cancer Maternal Aunt 76    No Known Problems Daughter     No Known Problems Paternal Aunt     Breast cancer Neg Hx       Current Medications:     Current Outpatient Medications   Medication Sig Dispense Refill    fluticasone (FLONASE) 50 mcg/act nasal spray 1 spray into each nostril daily 1 Bottle 1    loratadine (CLARITIN) 10 mg tablet Take 10 mg by mouth daily      estradiol (ESTRACE) 0 1 mg/g vaginal cream Insert 3 times a week 42 5 g 1     No current facility-administered medications for this visit  Allergies:     No Known Allergies   Physical Exam:     /74   Pulse 82   Temp 97 7 °F (36 5 °C) (Temporal)   Ht 4' 11" (1 499 m)   Wt 77 8 kg (171 lb 9 6 oz)   LMP 09/01/2010 (Approximate)   SpO2 97%   BMI 34 66 kg/m²     Physical Exam  Constitutional:       General: She is not in acute distress  Appearance: Normal appearance  She is well-developed  She is obese  She is not ill-appearing, toxic-appearing or diaphoretic  HENT:      Head: Normocephalic and atraumatic  Right Ear: External ear normal  There is impacted cerumen  Left Ear: External ear normal       Mouth/Throat:      Pharynx: No oropharyngeal exudate  Eyes:      General: No scleral icterus  Right eye: No discharge  Left eye: No discharge  Conjunctiva/sclera: Conjunctivae normal       Pupils: Pupils are equal, round, and reactive to light  Neck:      Thyroid: No thyromegaly  Cardiovascular:      Rate and Rhythm: Normal rate and regular rhythm  Heart sounds: Normal heart sounds  No murmur  No friction rub  No gallop  Pulmonary:      Effort: Pulmonary effort is normal  No respiratory distress  Breath sounds: Normal breath sounds  No wheezing or rales  Chest:      Chest wall: No tenderness  Abdominal:      General: Bowel sounds are normal  There is no distension  Palpations: Abdomen is soft  There is no mass  Tenderness: There is no abdominal tenderness  There is no guarding or rebound  Hernia: No hernia is present  Lymphadenopathy:      Cervical: No cervical adenopathy  Skin:     General: Skin is warm  Findings: No rash  Neurological:      Mental Status: She is alert and oriented to person, place, and time  Cranial Nerves: No cranial nerve deficit     Psychiatric:         Mood and Affect: Mood normal          Behavior: Behavior normal          Thought Content:  Thought content normal          Judgment: Judgment normal           Sadie Verduzco MD  [address] [place], [place] [id number]

## 2021-06-09 NOTE — PATIENT INSTRUCTIONS

## 2021-07-21 ENCOUNTER — VBI (OUTPATIENT)
Dept: ADMINISTRATIVE | Facility: OTHER | Age: 61
End: 2021-07-21

## 2021-07-21 ENCOUNTER — TELEMEDICINE (OUTPATIENT)
Dept: FAMILY MEDICINE CLINIC | Facility: CLINIC | Age: 61
End: 2021-07-21
Payer: COMMERCIAL

## 2021-07-21 VITALS — HEIGHT: 59 IN | BODY MASS INDEX: 34.47 KG/M2 | WEIGHT: 171 LBS

## 2021-07-21 DIAGNOSIS — J01.00 ACUTE NON-RECURRENT MAXILLARY SINUSITIS: Primary | ICD-10-CM

## 2021-07-21 PROCEDURE — 3008F BODY MASS INDEX DOCD: CPT | Performed by: PHYSICIAN ASSISTANT

## 2021-07-21 PROCEDURE — 99214 OFFICE O/P EST MOD 30 MIN: CPT | Performed by: PHYSICIAN ASSISTANT

## 2021-07-21 PROCEDURE — 1036F TOBACCO NON-USER: CPT | Performed by: PHYSICIAN ASSISTANT

## 2021-07-21 RX ORDER — AMOXICILLIN AND CLAVULANATE POTASSIUM 875; 125 MG/1; MG/1
1 TABLET, FILM COATED ORAL EVERY 12 HOURS SCHEDULED
Qty: 20 TABLET | Refills: 0 | Status: SHIPPED | OUTPATIENT
Start: 2021-07-21 | End: 2021-07-31

## 2021-07-21 NOTE — PROGRESS NOTES
Virtual Brief Visit    Verification of patient location:    Patient is currently located in the state of [place]  Patient is currently located in a state in which I am licensed    Assessment/Plan:    Problem List Items Addressed This Visit     None      Visit Diagnoses     Acute non-recurrent maxillary sinusitis    -  Primary    Relevant Medications    amoxicillin-clavulanate (AUGMENTIN) 875-125 mg per tablet                Reason for visit is   Chief Complaint   Patient presents with    Virtual Regular Visit    Sinus Problem     pain/pressure     Virtual Brief Visit        Encounter provider Tammy Torres PA-C    Provider located at [place] [address]  [address] [place] [id number]    Recent Visits  No visits were found meeting these conditions  Showing recent visits within past 7 days and meeting all other requirements  Today's Visits  Date Type Provider Dept   07/21/21 Telemedicine [facility]   Showing today's visits and meeting all other requirements  Future Appointments  No visits were found meeting these conditions  Showing future appointments within next 150 days and meeting all other requirements       After connecting through telephone, the patient was identified by name and date of birth  Ana Monet was informed that this is a telemedicine visit and that the visit is being conducted through telephone  My office door was closed  No one else was in the room  She acknowledged consent and understanding of privacy and security of the platform  The patient has agreed to participate and understands she can discontinue the visit at any time  Patient is aware this is a billable service  Subjective    Ana Monet is a 63 y o  female c/o sinus congestion  68-year-old female presents for evaluation of congestion, sinus pain  Has ongoing symptoms for over 1 week    Has tried Advil Sinus that relief of symptoms  Takes Flonase and Claritin daily  Does get sinus infections often  Denies shortness of breath  Denies COVID exposures  She is vaccinated against COVID  No recent travel  Past Medical History:   Diagnosis Date    Seasonal allergies     Sinus congestion     developed nasal congestion and cough 19- is going to call PCP today for RX; advised to call surgeon if not better next week   Skin lesion of left lower extremity 2019    Torn medial meniscus     [id number]  last assessed       Past Surgical History:   Procedure Laterality Date     SECTION      x2    COLONOSCOPY      KNEE ARTHROSCOPY, MEDIAL PATELLO FEMORAL LIGAMENT REPAIR      patellar closed    KNEE SURGERY      with medial meniscus repair    [place] TOTAL KNEE ARTHROPLASTY Right 2020    Procedure: ARTHROPLASTY KNEE TOTAL;  Surgeon: Ania Sullivan DO;  Location: [facility];  Service: Orthopedics    REPLACEMENT TOTAL KNEE         Current Outpatient Medications   Medication Sig Dispense Refill    amoxicillin-clavulanate (AUGMENTIN) 875-125 mg per tablet Take 1 tablet by mouth every 12 (twelve) hours for 10 days 20 tablet 0    estradiol (ESTRACE) 0 1 mg/g vaginal cream Insert 3 times a week 42 5 g 1    fluticasone (FLONASE) 50 mcg/act nasal spray 1 spray into each nostril daily 1 Bottle 1    loratadine (CLARITIN) 10 mg tablet Take 10 mg by mouth daily       No current facility-administered medications for this visit  No Known Allergies    Review of Systems   Constitutional: Negative for chills, fatigue and fever  HENT: Positive for congestion, sinus pressure and sinus pain  Negative for ear pain, sneezing, sore throat and trouble swallowing  Eyes: Negative for pain, discharge and redness  Respiratory: Negative for cough, chest tightness, shortness of breath and wheezing  Cardiovascular: Negative for chest pain, palpitations and leg swelling     Gastrointestinal: Negative for abdominal pain, diarrhea, nausea and vomiting  Musculoskeletal: Negative for arthralgias, joint swelling and myalgias  Skin: Negative for rash  Neurological: Negative for dizziness, weakness, numbness and headaches  Vitals:    07/21/21 1200   Weight: 77 6 kg (171 lb)   Height: 4' 11" (1 499 m)         I spent 5 minutes directly with the patient during this visit    [address] [place] [address] verbally agrees to participate in [employer]  Pt is aware that [employer] could be limited without vital signs or the ability to perform a full hands-on physical [employer] understands she or the provider may request at any time to terminate the video visit and request the patient to seek care or treatment in person

## 2021-09-27 ENCOUNTER — TELEPHONE (OUTPATIENT)
Dept: FAMILY MEDICINE CLINIC | Facility: CLINIC | Age: 61
End: 2021-09-27

## 2021-09-27 NOTE — TELEPHONE ENCOUNTER
Patient left a message at 10:24am   She stated that from her last visit she was trying to take vit B12 OTC but unable to take consistently  She said at her last visit it was mentioned about possible getting the B12 injection  Please advise -- she asked for an appt this Fri morning with you or Diogo Chávez, but no openings

## 2021-09-28 ENCOUNTER — TELEPHONE (OUTPATIENT)
Dept: OBGYN CLINIC | Facility: HOSPITAL | Age: 61
End: 2021-09-28

## 2021-09-28 DIAGNOSIS — Z96.651 S/P TKR (TOTAL KNEE REPLACEMENT) USING CEMENT, RIGHT: Primary | ICD-10-CM

## 2021-09-28 RX ORDER — AMOXICILLIN 500 MG/1
2000 CAPSULE ORAL
Qty: 4 CAPSULE | Refills: 2 | Status: SHIPPED | OUTPATIENT
Start: 2021-09-28 | End: 2021-12-27

## 2021-09-28 NOTE — TELEPHONE ENCOUNTER
I am uncertain to the question at hand.       Patient is having a dental procedure on Thursday 09- in the AM and is calling for antibiotic order  Patient had a knee replacement in January of 2020      Please send to [facility] in [place]    Please advise patient at [phone]

## 2021-10-01 ENCOUNTER — APPOINTMENT (OUTPATIENT)
Dept: RADIOLOGY | Facility: CLINIC | Age: 61
End: 2021-10-01
Payer: COMMERCIAL

## 2021-10-01 ENCOUNTER — OFFICE VISIT (OUTPATIENT)
Dept: OBGYN CLINIC | Facility: CLINIC | Age: 61
End: 2021-10-01
Payer: COMMERCIAL

## 2021-10-01 ENCOUNTER — CLINICAL SUPPORT (OUTPATIENT)
Dept: FAMILY MEDICINE CLINIC | Facility: CLINIC | Age: 61
End: 2021-10-01
Payer: COMMERCIAL

## 2021-10-01 VITALS
HEIGHT: 59 IN | SYSTOLIC BLOOD PRESSURE: 150 MMHG | BODY MASS INDEX: 34.47 KG/M2 | HEART RATE: 60 BPM | DIASTOLIC BLOOD PRESSURE: 80 MMHG | WEIGHT: 171 LBS

## 2021-10-01 DIAGNOSIS — Z96.651 S/P TKR (TOTAL KNEE REPLACEMENT) USING CEMENT, RIGHT: ICD-10-CM

## 2021-10-01 DIAGNOSIS — Z96.651 S/P TKR (TOTAL KNEE REPLACEMENT) USING CEMENT, RIGHT: Primary | ICD-10-CM

## 2021-10-01 DIAGNOSIS — Z96.651 AFTERCARE FOLLOWING RIGHT KNEE JOINT REPLACEMENT SURGERY: ICD-10-CM

## 2021-10-01 DIAGNOSIS — E53.8 B12 DEFICIENCY: Primary | ICD-10-CM

## 2021-10-01 DIAGNOSIS — Z47.1 AFTERCARE FOLLOWING RIGHT KNEE JOINT REPLACEMENT SURGERY: ICD-10-CM

## 2021-10-01 PROCEDURE — 3008F BODY MASS INDEX DOCD: CPT | Performed by: ORTHOPAEDIC SURGERY

## 2021-10-01 PROCEDURE — 96372 THER/PROPH/DIAG INJ SC/IM: CPT

## 2021-10-01 PROCEDURE — 1036F TOBACCO NON-USER: CPT | Performed by: ORTHOPAEDIC SURGERY

## 2021-10-01 PROCEDURE — 99214 OFFICE O/P EST MOD 30 MIN: CPT | Performed by: ORTHOPAEDIC SURGERY

## 2021-10-01 PROCEDURE — 73562 X-RAY EXAM OF KNEE 3: CPT

## 2021-10-01 RX ADMIN — CYANOCOBALAMIN 1000 MCG: 1000 INJECTION INTRAMUSCULAR; SUBCUTANEOUS at 10:20

## 2021-11-01 ENCOUNTER — CLINICAL SUPPORT (OUTPATIENT)
Dept: FAMILY MEDICINE CLINIC | Facility: CLINIC | Age: 61
End: 2021-11-01
Payer: COMMERCIAL

## 2021-11-01 DIAGNOSIS — E53.8 B12 DEFICIENCY: Primary | ICD-10-CM

## 2021-11-01 PROCEDURE — 96372 THER/PROPH/DIAG INJ SC/IM: CPT | Performed by: FAMILY MEDICINE

## 2021-11-01 RX ADMIN — CYANOCOBALAMIN 1000 MCG: 1000 INJECTION INTRAMUSCULAR; SUBCUTANEOUS at 08:34

## 2021-12-01 ENCOUNTER — CLINICAL SUPPORT (OUTPATIENT)
Dept: FAMILY MEDICINE CLINIC | Facility: CLINIC | Age: 61
End: 2021-12-01
Payer: COMMERCIAL

## 2021-12-01 DIAGNOSIS — E53.8 VITAMIN B12 DEFICIENCY: ICD-10-CM

## 2021-12-01 PROCEDURE — 96372 THER/PROPH/DIAG INJ SC/IM: CPT | Performed by: FAMILY MEDICINE

## 2021-12-01 RX ADMIN — CYANOCOBALAMIN 1000 MCG: 1000 INJECTION INTRAMUSCULAR; SUBCUTANEOUS at 08:07

## 2021-12-30 ENCOUNTER — TELEPHONE (OUTPATIENT)
Dept: FAMILY MEDICINE CLINIC | Facility: CLINIC | Age: 61
End: 2021-12-30

## 2021-12-30 PROCEDURE — U0005 INFEC AGEN DETEC AMPLI PROBE: HCPCS | Performed by: FAMILY MEDICINE

## 2021-12-30 PROCEDURE — U0003 INFECTIOUS AGENT DETECTION BY NUCLEIC ACID (DNA OR RNA); SEVERE ACUTE RESPIRATORY SYNDROME CORONAVIRUS 2 (SARS-COV-2) (CORONAVIRUS DISEASE [COVID-19]), AMPLIFIED PROBE TECHNIQUE, MAKING USE OF HIGH THROUGHPUT TECHNOLOGIES AS DESCRIBED BY CMS-2020-01-R: HCPCS | Performed by: FAMILY MEDICINE

## 2022-01-04 ENCOUNTER — TELEPHONE (OUTPATIENT)
Dept: OTHER | Facility: OTHER | Age: 62
End: 2022-01-04

## 2022-01-04 PROCEDURE — U0003 INFECTIOUS AGENT DETECTION BY NUCLEIC ACID (DNA OR RNA); SEVERE ACUTE RESPIRATORY SYNDROME CORONAVIRUS 2 (SARS-COV-2) (CORONAVIRUS DISEASE [COVID-19]), AMPLIFIED PROBE TECHNIQUE, MAKING USE OF HIGH THROUGHPUT TECHNOLOGIES AS DESCRIBED BY CMS-2020-01-R: HCPCS | Performed by: FAMILY MEDICINE

## 2022-01-04 PROCEDURE — U0005 INFEC AGEN DETEC AMPLI PROBE: HCPCS | Performed by: FAMILY MEDICINE

## 2022-01-04 NOTE — TELEPHONE ENCOUNTER
Pt is calling to have a COVID-19 script placed in the system since she has been exposed  She would like a call back to confirm this was completed

## 2022-02-08 ENCOUNTER — OFFICE VISIT (OUTPATIENT)
Dept: URGENT CARE | Facility: CLINIC | Age: 62
End: 2022-02-08
Payer: COMMERCIAL

## 2022-02-08 VITALS
RESPIRATION RATE: 18 BRPM | HEART RATE: 78 BPM | SYSTOLIC BLOOD PRESSURE: 150 MMHG | OXYGEN SATURATION: 97 % | DIASTOLIC BLOOD PRESSURE: 94 MMHG | TEMPERATURE: 97.5 F

## 2022-02-08 DIAGNOSIS — J01.00 ACUTE NON-RECURRENT MAXILLARY SINUSITIS: Primary | ICD-10-CM

## 2022-02-08 DIAGNOSIS — H65.02 NON-RECURRENT ACUTE SEROUS OTITIS MEDIA OF LEFT EAR: ICD-10-CM

## 2022-02-08 PROCEDURE — 99213 OFFICE O/P EST LOW 20 MIN: CPT | Performed by: NURSE PRACTITIONER

## 2022-02-08 RX ORDER — AMOXICILLIN AND CLAVULANATE POTASSIUM 875; 125 MG/1; MG/1
1 TABLET, FILM COATED ORAL EVERY 12 HOURS SCHEDULED
Qty: 14 TABLET | Refills: 0 | Status: SHIPPED | OUTPATIENT
Start: 2022-02-08 | End: 2022-02-15

## 2022-02-08 NOTE — PROGRESS NOTES
[facility]        NAME: Maye Yañez is a 63 y o  female  : 1960    MRN: [id number]  DATE: 2022  TIME: 7:15 PM    Assessment and Plan   Acute non-recurrent maxillary sinusitis [J01 00]  1  Acute non-recurrent maxillary sinusitis  amoxicillin-clavulanate (AUGMENTIN) 875-125 mg per tablet   2  Non-recurrent acute serous otitis media of left ear           Patient Instructions     Patient Instructions     Take medication as directed  Rest and drink plenty of fluids  A cool mist humidifier and saline rinse can be helpful  If you develop a worsening cough, chest pain, shortness of breath, palpitations, coughing up blood, prolonged high fever, severe headache, dizziness, any new or concerning symptoms please return or proceed ER  Recommend following up with PCP in 3-5 days      Sinusitis   WHAT YOU NEED TO KNOW:   Sinusitis is inflammation or infection of your sinuses  Sinusitis is most often caused by a virus  Acute sinusitis may last up to 12 weeks  Chronic sinusitis lasts longer than 12 weeks  Recurrent sinusitis means you have 4 or more infections in 1 year  DISCHARGE INSTRUCTIONS:   Return to the emergency department if:   · You have trouble breathing or wheezing that is getting worse  · You have a stiff neck, a fever, or a bad headache  · You cannot open your eye  · Your eyeball bulges out or you cannot move your eye  · You are more sleepy than normal, or you notice changes in your ability to think, move, or talk  · You have swelling of your forehead or scalp  Call your doctor if:   · You have vision changes, such as double vision  · Your eye and eyelid are red, swollen, and painful  · Your symptoms do not improve or go away after 10 days  · You have nausea and are vomiting  · Your nose is bleeding  · You have questions or concerns about your condition or care  Medicines: Your symptoms may go away on their own   Your healthcare provider may recommend watchful waiting for up to 10 days before starting antibiotics  You may need any of the following:  · Acetaminophen  decreases pain and fever  It is available without a doctor's order  Ask how much to take and how often to take it  Follow directions  Read the labels of all other medicines you are using to see if they also contain acetaminophen, or ask your doctor or pharmacist  Acetaminophen can cause liver damage if not taken correctly  Do not use more than 4 grams (4,000 milligrams) total of acetaminophen in one day  · NSAIDs , such as ibuprofen, help decrease swelling, pain, and fever  This medicine is available with or without a doctor's order  NSAIDs can cause stomach bleeding or kidney problems in certain people  If you take blood thinner medicine, always ask your healthcare provider if NSAIDs are safe for you  Always read the medicine label and follow directions  · Nasal steroid sprays  may help decrease inflammation in your nose and sinuses  · Decongestants  help reduce swelling and drain mucus in the nose and sinuses  They may help you breathe easier  · Antihistamines  help dry mucus in the nose and relieve sneezing  · Antibiotics  help treat or prevent a bacterial infection  · Take your medicine as directed  Contact your healthcare provider if you think your medicine is not helping or if you have side effects  Tell him or her if you are allergic to any medicine  Keep a list of the medicines, vitamins, and herbs you take  Include the amounts, and when and why you take them  Bring the list or the pill bottles to follow-up visits  Carry your medicine list with you in case of an emergency  Self-care:   · Rinse your sinuses as directed  Use a sinus rinse device to rinse your nasal passages with a saline (salt water) solution or distilled water  Do not use tap water  This will help thin the mucus in your nose and rinse away pollen and dirt   It will also help reduce swelling so you can breathe normally  · Use a humidifier  to increase air moisture in your home  This may make it easier for you to breathe and help decrease your cough  · Sleep with your head elevated  Place an extra pillow under your head before you go to sleep to help your sinuses drain  · Drink liquids as directed  Ask your healthcare provider how much liquid to drink each day and which liquids are best for you  Liquids will thin the mucus in your nose and help it drain  Avoid drinks that contain alcohol or caffeine  · Do not smoke, and avoid secondhand smoke  Nicotine and other chemicals in cigarettes and cigars can make your symptoms worse  Ask your healthcare provider for information if you currently smoke and need help to quit  E-cigarettes or smokeless tobacco still contain nicotine  Talk to your healthcare provider before you use these products  Prevent the spread of germs:   · Wash your hands often with soap and water  Wash your hands after you use the bathroom, change a child's diaper, or sneeze  Wash your hands before you prepare or eat food  · Stay away from people who are sick  Some germs spread easily and quickly through contact  Follow up with your doctor as directed: You may be referred to an ear, nose, and throat specialist  Write down your questions so you remember to ask them during your visits  © Copyright [employer] 2021 Information is for End User's use only and may not be sold, redistributed or otherwise used for commercial purposes  All illustrations and images included in CareNotes® are the copyrighted property of A D A M , Inc  or Marcus Pichardo   The above information is an  only  It is not intended as medical advice for individual conditions or treatments  Talk to your doctor, nurse or pharmacist before following any medical regimen to see if it is safe and effective for you          Follow up with PCP in 3-5 days   Proceed to  ER if symptoms worsen  Chief Complaint     Chief Complaint   Patient presents with    Cold Like Symptoms     Patient c/o left sided sinus pressure, sinus, and ear pain that started 4 days ago   Eye Problem     Patient c/o left eye irritation that started yesterday  History of Present Illness       Sinusitis  This is a new problem  Episode onset: 4 days ago  There has been no fever  The pain is moderate  Associated symptoms include congestion, ear pain and sinus pressure  Pertinent negatives include no chills, coughing, diaphoresis, headaches, neck pain, shortness of breath or sore throat  (Left eye redness and irritation) Past treatments include oral decongestants  The treatment provided mild relief  patient had a negative home covid test yesterday  Review of Systems   Review of Systems   Constitutional: Negative for chills, diaphoresis, fatigue and fever  HENT: Positive for congestion, ear pain, rhinorrhea, sinus pressure and sinus pain  Negative for ear discharge, facial swelling, postnasal drip, sore throat, tinnitus and trouble swallowing  Eyes: Positive for redness and itching  Negative for photophobia, pain, discharge and visual disturbance  Respiratory: Negative for cough, chest tightness, shortness of breath, wheezing and stridor  Cardiovascular: Negative for chest pain and palpitations  Gastrointestinal: Negative  Musculoskeletal: Negative for arthralgias, back pain, joint swelling, myalgias, neck pain and neck stiffness  Neurological: Negative for dizziness, facial asymmetry, weakness, light-headedness, numbness and headaches           Current Medications       Current Outpatient Medications:     amoxicillin-clavulanate (AUGMENTIN) 875-125 mg per tablet, Take 1 tablet by mouth every 12 (twelve) hours for 7 days, Disp: 14 tablet, Rfl: 0    estradiol (ESTRACE) 0 1 mg/g vaginal cream, Insert 3 times a week (Patient not taking: Reported on 2/8/2022 ), Disp: 42 5 g, Rfl: 1    fluticasone (FLONASE) 50 mcg/act nasal spray, 1 spray into each nostril daily (Patient not taking: Reported on 2022 ), Disp: 1 Bottle, Rfl: 1    loratadine (CLARITIN) 10 mg tablet, Take 10 mg by mouth daily (Patient not taking: Reported on 2022 ), Disp: , Rfl:     Current Facility-Administered Medications:     cyanocobalamin injection 1,000 mcg, 1,000 mcg, Intramuscular, Q30 Days, Edith Lu MD, 1,000 mcg at 21 0807    Current Allergies     Allergies as of 2022    (No Known Allergies)            The following portions of the patient's history were reviewed and updated as appropriate: allergies, current medications, past family history, past medical history, past social history, past surgical history and problem list      Past Medical History:   Diagnosis Date    Seasonal allergies     Sinus congestion     developed nasal congestion and cough 19- is going to call PCP today for RX; advised to call surgeon if not better next week      Skin lesion of left lower extremity 2019    Torn medial meniscus     2016  last assessed       Past Surgical History:   Procedure Laterality Date     SECTION      x2    COLONOSCOPY      KNEE ARTHROSCOPY, MEDIAL PATELLO FEMORAL LIGAMENT REPAIR      patellar closed    KNEE SURGERY      with medial meniscus repair    [place] TOTAL KNEE ARTHROPLASTY Right 2020    Procedure: ARTHROPLASTY KNEE TOTAL;  Surgeon: Angela Kim DO;  Location: [facility];  Service: Orthopedics    REPLACEMENT TOTAL KNEE         Family History   Problem Relation Age of Onset    Hypertension Mother     Lymphoma Mother 66    No Known Problems Father     Colon cancer Maternal Grandfather [de-identified]    No Known Problems Daughter     No Known Problems Maternal Grandmother     No Known Problems Paternal Grandmother     Pancreatic cancer Maternal Aunt 76    No Known Problems Daughter     No Known Problems Paternal Aunt     Breast cancer Neg Hx          Medications have been verified  Objective   /94   Pulse 78   Temp 97 5 °F (36 4 °C) (Temporal)   Resp 18   LMP 09/01/2010 (Approximate)   SpO2 97%   Patient's last menstrual period was 09/01/2010 (approximate)  Physical Exam     Physical Exam  Constitutional:       General: She is not in acute distress  Appearance: She is well-developed  She is not diaphoretic  HENT:      Head: Normocephalic and atraumatic  Right Ear: Hearing, tympanic membrane, ear canal and external ear normal       Left Ear: Hearing, ear canal and external ear normal  Tympanic membrane is injected, erythematous and bulging  Nose: Mucosal edema and rhinorrhea present  Right Sinus: Maxillary sinus tenderness present  No frontal sinus tenderness  Left Sinus: Maxillary sinus tenderness present  No frontal sinus tenderness  Mouth/Throat:      Pharynx: Oropharynx is clear  Uvula midline  Eyes:      General: Lids are normal          Left eye: No discharge  Extraocular Movements: Extraocular movements intact  Conjunctiva/sclera:      Left eye: Left conjunctiva is injected (mild)  Pupils: Pupils are equal, round, and reactive to light  Cardiovascular:      Rate and Rhythm: Normal rate and regular rhythm  Heart sounds: Normal heart sounds, S1 normal and S2 normal    Pulmonary:      Effort: Pulmonary effort is normal       Breath sounds: Normal breath sounds  Lymphadenopathy:      Cervical: No cervical adenopathy  Skin:     General: Skin is warm and dry  Neurological:      Mental Status: She is alert and oriented to person, place, and time

## 2022-02-08 NOTE — PATIENT INSTRUCTIONS
Take medication as directed  Rest and drink plenty of fluids  A cool mist humidifier and saline rinse can be helpful  If you develop a worsening cough, chest pain, shortness of breath, palpitations, coughing up blood, prolonged high fever, severe headache, dizziness, any new or concerning symptoms please return or proceed ER  Recommend following up with PCP in 3-5 days      Sinusitis   WHAT YOU NEED TO KNOW:   Sinusitis is inflammation or infection of your sinuses  Sinusitis is most often caused by a virus  Acute sinusitis may last up to 12 weeks  Chronic sinusitis lasts longer than 12 weeks  Recurrent sinusitis means you have 4 or more infections in 1 year  DISCHARGE INSTRUCTIONS:   Return to the emergency department if:   · You have trouble breathing or wheezing that is getting worse  · You have a stiff neck, a fever, or a bad headache  · You cannot open your eye  · Your eyeball bulges out or you cannot move your eye  · You are more sleepy than normal, or you notice changes in your ability to think, move, or talk  · You have swelling of your forehead or scalp  Call your doctor if:   · You have vision changes, such as double vision  · Your eye and eyelid are red, swollen, and painful  · Your symptoms do not improve or go away after 10 days  · You have nausea and are vomiting  · Your nose is bleeding  · You have questions or concerns about your condition or care  Medicines: Your symptoms may go away on their own  Your healthcare provider may recommend watchful waiting for up to 10 days before starting antibiotics  You may need any of the following:  · Acetaminophen  decreases pain and fever  It is available without a doctor's order  Ask how much to take and how often to take it  Follow directions   Read the labels of all other medicines you are using to see if they also contain acetaminophen, or ask your doctor or pharmacist  Acetaminophen can cause liver damage if not taken correctly  Do not use more than 4 grams (4,000 milligrams) total of acetaminophen in one day  · NSAIDs , such as ibuprofen, help decrease swelling, pain, and fever  This medicine is available with or without a doctor's order  NSAIDs can cause stomach bleeding or kidney problems in certain people  If you take blood thinner medicine, always ask your healthcare provider if NSAIDs are safe for you  Always read the medicine label and follow directions  · Nasal steroid sprays  may help decrease inflammation in your nose and sinuses  · Decongestants  help reduce swelling and drain mucus in the nose and sinuses  They may help you breathe easier  · Antihistamines  help dry mucus in the nose and relieve sneezing  · Antibiotics  help treat or prevent a bacterial infection  · Take your medicine as directed  Contact your healthcare provider if you think your medicine is not helping or if you have side effects  Tell him or her if you are allergic to any medicine  Keep a list of the medicines, vitamins, and herbs you take  Include the amounts, and when and why you take them  Bring the list or the pill bottles to follow-up visits  Carry your medicine list with you in case of an emergency  Self-care:   · Rinse your sinuses as directed  Use a sinus rinse device to rinse your nasal passages with a saline (salt water) solution or distilled water  Do not use tap water  This will help thin the mucus in your nose and rinse away pollen and dirt  It will also help reduce swelling so you can breathe normally  · Use a humidifier  to increase air moisture in your home  This may make it easier for you to breathe and help decrease your cough  · Sleep with your head elevated  Place an extra pillow under your head before you go to sleep to help your sinuses drain  · Drink liquids as directed  Ask your healthcare provider how much liquid to drink each day and which liquids are best for you   Liquids will thin the mucus in your nose and help it drain  Avoid drinks that contain alcohol or caffeine  · Do not smoke, and avoid secondhand smoke  Nicotine and other chemicals in cigarettes and cigars can make your symptoms worse  Ask your healthcare provider for information if you currently smoke and need help to quit  E-cigarettes or smokeless tobacco still contain nicotine  Talk to your healthcare provider before you use these products  Prevent the spread of germs:   · Wash your hands often with soap and water  Wash your hands after you use the bathroom, change a child's diaper, or sneeze  Wash your hands before you prepare or eat food  · Stay away from people who are sick  Some germs spread easily and quickly through contact  Follow up with your doctor as directed: You may be referred to an ear, nose, and throat specialist  Write down your questions so you remember to ask them during your visits  © Copyright [employer] 2021 Information is for End User's use only and may not be sold, redistributed or otherwise used for commercial purposes  All illustrations and images included in CareNotes® are the copyrighted property of [employer]  or [facility] Emma Pichardo   The above information is an  only  It is not intended as medical advice for individual conditions or treatments  Talk to your doctor, nurse or pharmacist before following any medical regimen to see if it is safe and effective for you

## 2022-02-11 ENCOUNTER — RA CDI HCC (OUTPATIENT)
Dept: OTHER | Facility: HOSPITAL | Age: 62
End: 2022-02-11

## 2022-02-11 NOTE — PROGRESS NOTES
[facility]  coding opportunities       Chart reviewed, no opportunity found: CHART REVIEWED, NO OPPORTUNITY FOUND                        Patients insurance company: [employer] (Medicare Advantage and Commercial)

## 2022-02-18 ENCOUNTER — OFFICE VISIT (OUTPATIENT)
Dept: FAMILY MEDICINE CLINIC | Facility: CLINIC | Age: 62
End: 2022-02-18
Payer: COMMERCIAL

## 2022-02-18 VITALS
TEMPERATURE: 97.7 F | BODY MASS INDEX: 34.27 KG/M2 | SYSTOLIC BLOOD PRESSURE: 171 MMHG | HEIGHT: 59 IN | OXYGEN SATURATION: 95 % | HEART RATE: 83 BPM | WEIGHT: 170 LBS | DIASTOLIC BLOOD PRESSURE: 98 MMHG

## 2022-02-18 DIAGNOSIS — F41.8 SITUATIONAL ANXIETY: ICD-10-CM

## 2022-02-18 DIAGNOSIS — Z12.31 ENCOUNTER FOR SCREENING MAMMOGRAM FOR MALIGNANT NEOPLASM OF BREAST: ICD-10-CM

## 2022-02-18 DIAGNOSIS — H10.31 ACUTE CONJUNCTIVITIS OF RIGHT EYE, UNSPECIFIED ACUTE CONJUNCTIVITIS TYPE: ICD-10-CM

## 2022-02-18 DIAGNOSIS — E53.8 VITAMIN B12 DEFICIENCY: ICD-10-CM

## 2022-02-18 DIAGNOSIS — J01.10 ACUTE NON-RECURRENT FRONTAL SINUSITIS: ICD-10-CM

## 2022-02-18 DIAGNOSIS — I10 PRIMARY HYPERTENSION: Primary | ICD-10-CM

## 2022-02-18 PROCEDURE — 1036F TOBACCO NON-USER: CPT | Performed by: FAMILY MEDICINE

## 2022-02-18 PROCEDURE — 99214 OFFICE O/P EST MOD 30 MIN: CPT | Performed by: FAMILY MEDICINE

## 2022-02-18 PROCEDURE — 3725F SCREEN DEPRESSION PERFORMED: CPT | Performed by: FAMILY MEDICINE

## 2022-02-18 PROCEDURE — 3008F BODY MASS INDEX DOCD: CPT | Performed by: FAMILY MEDICINE

## 2022-02-18 RX ORDER — SERTRALINE HYDROCHLORIDE 25 MG/1
25 TABLET, FILM COATED ORAL DAILY
Qty: 30 TABLET | Refills: 5 | Status: SHIPPED | OUTPATIENT
Start: 2022-02-18 | End: 2022-03-21 | Stop reason: SDUPTHER

## 2022-02-18 RX ORDER — HYDROCHLOROTHIAZIDE 25 MG/1
25 TABLET ORAL DAILY
Qty: 30 TABLET | Refills: 5 | Status: SHIPPED | OUTPATIENT
Start: 2022-02-18

## 2022-02-18 RX ORDER — POLYMYXIN B SULFATE AND TRIMETHOPRIM 1; 10000 MG/ML; [USP'U]/ML
1 SOLUTION OPHTHALMIC EVERY 4 HOURS
Qty: 10 ML | Refills: 0 | Status: SHIPPED | OUTPATIENT
Start: 2022-02-18 | End: 2022-03-21 | Stop reason: ALTCHOICE

## 2022-02-18 RX ORDER — AMOXICILLIN AND CLAVULANATE POTASSIUM 875; 125 MG/1; MG/1
1 TABLET, FILM COATED ORAL EVERY 12 HOURS SCHEDULED
Qty: 20 TABLET | Refills: 0 | Status: SHIPPED | OUTPATIENT
Start: 2022-02-18 | End: 2022-02-28

## 2022-02-18 RX ADMIN — CYANOCOBALAMIN 1000 MCG: 1000 INJECTION INTRAMUSCULAR; SUBCUTANEOUS at 09:47

## 2022-02-18 NOTE — ASSESSMENT & PLAN NOTE
Start Zoloft  We reviewed the risks and benefits of anti-depressant medication  We discussed that it can take several weeks for these medications to start working  Common side effects reviewed  The patient will follow-up in 1 month to recheck their symptoms

## 2022-02-18 NOTE — PROGRESS NOTES
Assessment/Plan:         Problem List Items Addressed This Visit        Respiratory    Acute non-recurrent frontal sinusitis     10 days of Augmentin  Discussed common side effects  Relevant Medications    amoxicillin-clavulanate (Augmentin) 875-125 mg per tablet       Cardiovascular and Mediastinum    Primary hypertension - Primary     Start HCTZ  Discussed common side effects  Recheck in 4 wk  Monitor at home  Relevant Medications    hydrochlorothiazide (HYDRODIURIL) 25 mg tablet       Other    Vitamin B12 deficiency     b12 injection today         Situational anxiety     Start Zoloft  We reviewed the risks and benefits of anti-depressant medication  We discussed that it can take several weeks for these medications to start working  Common side effects reviewed  The patient will follow-up in 1 month to recheck their symptoms  Relevant Medications    sertraline (Zoloft) 25 mg tablet    Acute conjunctivitis of right eye     Polytrim eye drops for 7 days  Warm compresses         Relevant Medications    polymyxin b-trimethoprim (POLYTRIM) ophthalmic solution      Other Visit Diagnoses     Encounter for screening mammogram for malignant neoplasm of breast        Relevant Orders    Mammo screening bilateral w cad            Subjective:      Patient ID: Mannie Solomon is a 63 y o  female  81-year-old here for several complaints  She was seen at urgent care on 2/8 - diagnosed with ear, sinus and eye infection  She was treated with Augmentin x 7 days  Symptoms improved but now worsening  R eye is draining  No current treatment  No fevers, chills, body aches  She does have a sore throat  Sinus pressure  She has a history of high blood pressure has been high multiple occasions  Today her BP is elevated  At home when she is not stressed her BP is 130/80  She thinks her BP is running high due to stress  She has anxiety, mostly related to work  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  She says the environment is stressful  She has had physical symptoms including increased BP, headaches, palpitations  She took Xanax in the past but knows this is not a long term option  Also due for B12 injection      The following portions of the patient's history were reviewed and updated as appropriate:   Past Medical History:  She has a past medical history of Seasonal allergies, Sinus congestion, Skin lesion of left lower extremity (2019), and Torn medial meniscus  ,  _______________________________________________________________________  Medical Problems:  does not have any pertinent problems on file ,  _______________________________________________________________________  Past Surgical History:   has a past surgical history that includes Knee surgery; Replacement total knee; Knee arthroscopy, medial patello femoral ligament repair;  section; Colonoscopy; and pr total knee arthroplasty (Right, 2020)  ,  _______________________________________________________________________  Family History:  family history includes Colon cancer (age of onset: [de-identified]) in her maternal grandfather; Hypertension in her mother; Lymphoma (age of onset: 66) in her mother; No Known Problems in her daughter, daughter, father, maternal grandmother, paternal aunt, and paternal grandmother; Pancreatic cancer (age of onset: 76) in her maternal aunt  ,  _______________________________________________________________________  Social History:   reports that she has never smoked  She has never used smokeless tobacco  She reports current alcohol use  She reports that she does not use drugs  ,  _______________________________________________________________________  Allergies:  has No Known Allergies     _______________________________________________________________________  Current Outpatient Medications   Medication Sig Dispense Refill    amoxicillin-clavulanate (Augmentin) 875-125 mg per tablet Take 1 tablet by mouth every 12 (twelve) hours for 10 days 20 tablet 0    hydrochlorothiazide (HYDRODIURIL) 25 mg tablet Take 1 tablet (25 mg total) by mouth daily 30 tablet 5    polymyxin b-trimethoprim (POLYTRIM) ophthalmic solution Administer 1 drop to the right eye every 4 (four) hours 10 mL 0    sertraline (Zoloft) 25 mg tablet Take 1 tablet (25 mg total) by mouth daily 30 tablet 5     Current Facility-Administered Medications   Medication Dose Route Frequency Provider Last Rate Last Admin    cyanocobalamin injection 1,000 mcg  1,000 mcg Intramuscular Q30 Days Joe Piña MD   1,000 mcg at 02/18/22 0947     _______________________________________________________________________  Review of Systems   Constitutional: Negative for chills and fever  HENT: Positive for congestion, sinus pressure and sinus pain  Negative for ear discharge and ear pain  Eyes: Positive for discharge  Respiratory: Negative for cough  Cardiovascular: Negative for chest pain  Psychiatric/Behavioral: The patient is nervous/anxious  Stress         Objective:  Vitals:    02/18/22 0902   BP: (!) 171/98   Pulse: 83   Temp: 97 7 °F (36 5 °C)   SpO2: 95%   Weight: 77 1 kg (170 lb)   Height: 4' 11" (1 499 m)     Body mass index is 34 34 kg/m²  Physical Exam  Vitals and nursing note reviewed  Constitutional:       General: She is not in acute distress  Appearance: Normal appearance  She is well-developed  She is not ill-appearing, toxic-appearing or diaphoretic  HENT:      Head: Normocephalic and atraumatic  Right Ear: Hearing, tympanic membrane, ear canal and external ear normal       Left Ear: Hearing, tympanic membrane, ear canal and external ear normal       Nose:      Right Sinus: Maxillary sinus tenderness and frontal sinus tenderness present  Left Sinus: Maxillary sinus tenderness and frontal sinus tenderness present  Mouth/Throat:      Pharynx: Uvula midline  Posterior oropharyngeal erythema present   No oropharyngeal exudate  Eyes:      Conjunctiva/sclera:      Right eye: Right conjunctiva is injected  Pupils: Pupils are equal, round, and reactive to light  Cardiovascular:      Rate and Rhythm: Normal rate and regular rhythm  Heart sounds: Normal heart sounds  No murmur heard  No friction rub  No gallop  Pulmonary:      Effort: Pulmonary effort is normal  No respiratory distress  Breath sounds: Normal breath sounds  No stridor  No wheezing, rhonchi or rales  Musculoskeletal:         General: No swelling  Right lower leg: No edema  Left lower leg: No edema  Lymphadenopathy:      Cervical: No cervical adenopathy  Skin:     General: Skin is warm  Findings: No rash  Neurological:      General: No focal deficit present  Mental Status: She is alert and oriented to person, place, and time  Mental status is at baseline  Psychiatric:         Attention and Perception: Attention normal          Mood and Affect: Mood normal          Speech: Speech normal          Behavior: Behavior normal          Thought Content:  Thought content normal          Judgment: Judgment normal

## 2022-03-21 ENCOUNTER — OFFICE VISIT (OUTPATIENT)
Dept: FAMILY MEDICINE CLINIC | Facility: CLINIC | Age: 62
End: 2022-03-21
Payer: COMMERCIAL

## 2022-03-21 VITALS
SYSTOLIC BLOOD PRESSURE: 130 MMHG | OXYGEN SATURATION: 98 % | HEART RATE: 72 BPM | HEIGHT: 59 IN | WEIGHT: 169 LBS | TEMPERATURE: 98.3 F | BODY MASS INDEX: 34.07 KG/M2 | DIASTOLIC BLOOD PRESSURE: 88 MMHG

## 2022-03-21 DIAGNOSIS — Z13.220 LIPID SCREENING: ICD-10-CM

## 2022-03-21 DIAGNOSIS — F41.8 SITUATIONAL ANXIETY: ICD-10-CM

## 2022-03-21 DIAGNOSIS — D64.9 ANEMIA, UNSPECIFIED TYPE: ICD-10-CM

## 2022-03-21 DIAGNOSIS — I10 PRIMARY HYPERTENSION: ICD-10-CM

## 2022-03-21 DIAGNOSIS — E53.8 VITAMIN B12 DEFICIENCY: Primary | ICD-10-CM

## 2022-03-21 PROBLEM — H10.31 ACUTE CONJUNCTIVITIS OF RIGHT EYE: Status: RESOLVED | Noted: 2022-02-18 | Resolved: 2022-03-21

## 2022-03-21 PROBLEM — J01.10 ACUTE NON-RECURRENT FRONTAL SINUSITIS: Status: RESOLVED | Noted: 2022-02-18 | Resolved: 2022-03-21

## 2022-03-21 PROCEDURE — 3008F BODY MASS INDEX DOCD: CPT | Performed by: FAMILY MEDICINE

## 2022-03-21 PROCEDURE — 99214 OFFICE O/P EST MOD 30 MIN: CPT | Performed by: FAMILY MEDICINE

## 2022-03-21 PROCEDURE — 1036F TOBACCO NON-USER: CPT | Performed by: FAMILY MEDICINE

## 2022-03-21 RX ADMIN — CYANOCOBALAMIN 1000 MCG: 1000 INJECTION INTRAMUSCULAR; SUBCUTANEOUS at 16:24

## 2022-03-21 NOTE — PROGRESS NOTES
Assessment/Plan:         Problem List Items Addressed This Visit        Cardiovascular and Mediastinum    Primary hypertension     Improved on HCTZ - continue         Relevant Orders    Comprehensive metabolic panel       Other    Vitamin B12 deficiency - Primary     B12 injection today  Will check levels with next set of labs  Relevant Orders    Vitamin B12    Situational anxiety     Increase Sertraline to 50 mg          Relevant Medications    sertraline (Zoloft) 50 mg tablet      Other Visit Diagnoses     Lipid screening        Relevant Orders    Comprehensive metabolic panel    Lipid Panel with Direct LDL reflex    Anemia, unspecified type        Relevant Orders    CBC and differential          Check labs and return in June for physical    Discussed reduction of cardiovascular risk factors  Her 10 year ASCVD risk score is low 3 7%  Subjective:      Patient ID: Camelia Obrien is a 63 y o  female  Here for BP recheck  She was started on HCTZ which has helped but her BP is still little elevated  Has only checked it a few times at home  Anxiety - still having some attacks but is sleeping better  Would like to try higher dose of sertraline  Currently on 25 mg    Asking about risks for heart disease- recently had a family member with heart attack  Denies any symtpoms  The following portions of the patient's history were reviewed and updated as appropriate:   Past Medical History:  She has a past medical history of Seasonal allergies, Sinus congestion, Skin lesion of left lower extremity (8/22/2019), and Torn medial meniscus  ,  _______________________________________________________________________  Medical Problems:  does not have any pertinent problems on file ,  _______________________________________________________________________  Past Surgical History:   has a past surgical history that includes Knee surgery; Replacement total knee; Knee arthroscopy, medial patello femoral ligament repair;  section; Colonoscopy; and pr total knee arthroplasty (Right, 2020)  ,  _______________________________________________________________________  Family History:  family history includes Colon cancer (age of onset: [de-identified]) in her maternal grandfather; Hypertension in her mother; Lymphoma (age of onset: 66) in her mother; No Known Problems in her daughter, daughter, father, maternal grandmother, paternal aunt, and paternal grandmother; Pancreatic cancer (age of onset: 76) in her maternal aunt  ,  _______________________________________________________________________  Social History:   reports that she has never smoked  She has never used smokeless tobacco  She reports current alcohol use  She reports that she does not use drugs  ,  _______________________________________________________________________  Allergies:  has No Known Allergies     _______________________________________________________________________  Current Outpatient Medications   Medication Sig Dispense Refill    hydrochlorothiazide (HYDRODIURIL) 25 mg tablet Take 1 tablet (25 mg total) by mouth daily 30 tablet 5    sertraline (Zoloft) 50 mg tablet Take 1 tablet (50 mg total) by mouth daily 30 tablet 5     Current Facility-Administered Medications   Medication Dose Route Frequency Provider Last Rate Last Admin    cyanocobalamin injection 1,000 mcg  1,000 mcg Intramuscular Q30 Days Jesenia Grey MD   1,000 mcg at 22 0947     _______________________________________________________________________  Review of Systems   Constitutional: Negative for activity change  Respiratory: Negative for chest tightness and shortness of breath  Cardiovascular: Negative for chest pain and leg swelling  Neurological: Negative for headaches  Psychiatric/Behavioral: Negative for dysphoric mood  The patient is nervous/anxious            Objective:  Vitals:    22 1549 22 1613   BP: 140/88 130/88   Pulse: 72    Temp: 98 3 °F (36 8 °C) SpO2: 98%    Weight: 76 7 kg (169 lb)    Height: 4' 11" (1 499 m)      Body mass index is 34 13 kg/m²  Physical Exam  Vitals and nursing note reviewed  Constitutional:       General: She is not in acute distress  Appearance: Normal appearance  She is not ill-appearing, toxic-appearing or diaphoretic  HENT:      Head: Normocephalic and atraumatic  Right Ear: External ear normal       Left Ear: External ear normal    Cardiovascular:      Rate and Rhythm: Normal rate and regular rhythm  Heart sounds: No murmur heard  No friction rub  Pulmonary:      Effort: Pulmonary effort is normal  No respiratory distress  Breath sounds: Normal breath sounds  No stridor  No wheezing, rhonchi or rales  Musculoskeletal:         General: No swelling  Right lower leg: No edema  Left lower leg: No edema  Neurological:      General: No focal deficit present  Mental Status: She is alert  Mental status is at baseline  Psychiatric:         Attention and Perception: Attention normal          Mood and Affect: Mood normal          Speech: Speech normal          Behavior: Behavior normal          Thought Content:  Thought content normal          Judgment: Judgment normal

## 2022-05-19 ENCOUNTER — VBI (OUTPATIENT)
Dept: ADMINISTRATIVE | Facility: OTHER | Age: 62
End: 2022-05-19

## 2022-05-26 ENCOUNTER — VBI (OUTPATIENT)
Dept: ADMINISTRATIVE | Facility: OTHER | Age: 62
End: 2022-05-26

## 2022-06-17 ENCOUNTER — RA CDI HCC (OUTPATIENT)
Dept: OTHER | Facility: HOSPITAL | Age: 62
End: 2022-06-17

## 2022-06-17 NOTE — PROGRESS NOTES
[facility]  coding opportunities       Chart reviewed, no opportunity found: CHART REVIEWED, NO OPPORTUNITY FOUND        Patients Insurance        Commercial Insurance: [address] [place] [address]

## 2022-07-29 ENCOUNTER — CLINICAL SUPPORT (OUTPATIENT)
Dept: FAMILY MEDICINE CLINIC | Facility: CLINIC | Age: 62
End: 2022-07-29
Payer: COMMERCIAL

## 2022-07-29 DIAGNOSIS — E53.8 VITAMIN B12 DEFICIENCY: Primary | ICD-10-CM

## 2022-07-29 PROCEDURE — 96372 THER/PROPH/DIAG INJ SC/IM: CPT | Performed by: FAMILY MEDICINE

## 2022-07-29 RX ADMIN — CYANOCOBALAMIN 1000 MCG: 1000 INJECTION, SOLUTION INTRAMUSCULAR; SUBCUTANEOUS at 14:41

## 2022-08-12 DIAGNOSIS — I10 PRIMARY HYPERTENSION: ICD-10-CM

## 2022-08-12 RX ORDER — HYDROCHLOROTHIAZIDE 25 MG/1
TABLET ORAL
Qty: 30 TABLET | Refills: 5 | Status: SHIPPED | OUTPATIENT
Start: 2022-08-12

## 2022-08-25 ENCOUNTER — VBI (OUTPATIENT)
Dept: ADMINISTRATIVE | Facility: OTHER | Age: 62
End: 2022-08-25

## 2022-08-29 ENCOUNTER — CLINICAL SUPPORT (OUTPATIENT)
Dept: FAMILY MEDICINE CLINIC | Facility: CLINIC | Age: 62
End: 2022-08-29
Payer: COMMERCIAL

## 2022-08-29 DIAGNOSIS — E53.8 VITAMIN B12 DEFICIENCY: ICD-10-CM

## 2022-08-29 PROCEDURE — 96372 THER/PROPH/DIAG INJ SC/IM: CPT | Performed by: FAMILY MEDICINE

## 2022-08-29 RX ADMIN — CYANOCOBALAMIN 1000 MCG: 1000 INJECTION, SOLUTION INTRAMUSCULAR; SUBCUTANEOUS at 08:21

## 2022-09-08 ENCOUNTER — VBI (OUTPATIENT)
Dept: ADMINISTRATIVE | Facility: OTHER | Age: 62
End: 2022-09-08

## 2022-09-26 ENCOUNTER — CLINICAL SUPPORT (OUTPATIENT)
Dept: FAMILY MEDICINE CLINIC | Facility: CLINIC | Age: 62
End: 2022-09-26
Payer: COMMERCIAL

## 2022-09-26 DIAGNOSIS — E53.8 VITAMIN B12 DEFICIENCY: ICD-10-CM

## 2022-09-26 DIAGNOSIS — Z23 FLU VACCINE NEED: Primary | ICD-10-CM

## 2022-09-26 PROCEDURE — 90471 IMMUNIZATION ADMIN: CPT

## 2022-09-26 PROCEDURE — 90682 RIV4 VACC RECOMBINANT DNA IM: CPT

## 2022-09-26 PROCEDURE — 96372 THER/PROPH/DIAG INJ SC/IM: CPT | Performed by: FAMILY MEDICINE

## 2022-09-26 RX ADMIN — CYANOCOBALAMIN 1000 MCG: 1000 INJECTION, SOLUTION INTRAMUSCULAR; SUBCUTANEOUS at 08:27

## 2022-09-28 ENCOUNTER — VBI (OUTPATIENT)
Dept: ADMINISTRATIVE | Facility: OTHER | Age: 62
End: 2022-09-28

## 2022-10-31 ENCOUNTER — CLINICAL SUPPORT (OUTPATIENT)
Dept: FAMILY MEDICINE CLINIC | Facility: CLINIC | Age: 62
End: 2022-10-31

## 2022-10-31 DIAGNOSIS — E53.8 VITAMIN B12 DEFICIENCY: Primary | ICD-10-CM

## 2022-10-31 RX ADMIN — CYANOCOBALAMIN 1000 MCG: 1000 INJECTION, SOLUTION INTRAMUSCULAR; SUBCUTANEOUS at 08:08

## 2022-11-19 ENCOUNTER — NURSE TRIAGE (OUTPATIENT)
Dept: OTHER | Facility: OTHER | Age: 62
End: 2022-11-19

## 2022-11-19 NOTE — TELEPHONE ENCOUNTER
Regarding: Sinus infection  ----- Message from Alberto Flowers RN sent at 11/19/2022 10:13 AM EST -----  "I know I have a sinus infection  I develop them quite often  I have lots of sinus congestion and pressure"

## 2022-11-19 NOTE — TELEPHONE ENCOUNTER
Reason for Disposition  • [1] SEVERE pain AND [2] not improved 2 hours after pain medicine    Answer Assessment - Initial Assessment Questions  1  LOCATION: "Where does it hurt?"       Behind and between eyes pressure   2  ONSET: "When did the sinus pain start?"  (e g , hours, days)      About 2 weeks ago   3  SEVERITY: "How bad is the pain?"   (Scale 1-10; mild, moderate or severe)    - MILD (1-3): doesn't interfere with normal activities     - MODERATE (4-7): interferes with normal activities (e g , work or school) or awakens from sleep    - SEVERE (8-10): excruciating pain and patient unable to do any normal activities        8/10  4  RECURRENT SYMPTOM: "Have you ever had sinus problems before?" If Yes, ask: "When was the last time?" and "What happened that time?"       Yes   5  NASAL CONGESTION: "Is the nose blocked?" If Yes, ask: "Can you open it or must you breathe through the mouth?"     Yes   6  NASAL DISCHARGE: "Do you have discharge from your nose?" If so ask, "What color?"      Green   7  FEVER: "Do you have a fever?" If Yes, ask: "What is it, how was it measured, and when did it start?"     Denies   8   OTHER SYMPTOMS: "Do you have any other symptoms?" (e g , sore throat, cough, earache, difficulty breathing)    Headache    Protocols used: SINUS PAIN OR CONGESTION-ADULT-

## 2022-11-19 NOTE — TELEPHONE ENCOUNTER
Patient called in stating she has a sinus infections  States she has hx of sinus infection  Started with sinus pressure and cough but progressed over the last 2 weeks  Mucus is green/yellow, headache, and cough  Denies fever or any other symptoms  Taking  Sinus y advil cold and sinus and allergy medication but didn't alliveate symptoms

## 2022-11-20 ENCOUNTER — AMB VIDEO VISIT (OUTPATIENT)
Dept: OTHER | Facility: HOSPITAL | Age: 62
End: 2022-11-20

## 2022-11-20 DIAGNOSIS — J01.00 ACUTE NON-RECURRENT MAXILLARY SINUSITIS: Primary | ICD-10-CM

## 2022-11-20 RX ORDER — AMOXICILLIN AND CLAVULANATE POTASSIUM 875; 125 MG/1; MG/1
1 TABLET, FILM COATED ORAL 2 TIMES DAILY
Qty: 20 TABLET | Refills: 0 | Status: SHIPPED | OUTPATIENT
Start: 2022-11-20 | End: 2022-11-30

## 2022-11-20 RX ORDER — LORATADINE 10 MG/1
10 TABLET ORAL DAILY
COMMUNITY

## 2022-11-20 RX ORDER — FLUTICASONE PROPIONATE 50 MCG
1 SPRAY, SUSPENSION (ML) NASAL DAILY
COMMUNITY

## 2022-11-20 NOTE — PATIENT INSTRUCTIONS
Sinusitis   WHAT YOU NEED TO KNOW:   Sinusitis is inflammation or infection of your sinuses  Sinusitis is most often caused by a virus  Acute sinusitis may last up to 12 weeks  Chronic sinusitis lasts longer than 12 weeks  Recurrent sinusitis means you have 4 or more infections in 1 year  DISCHARGE INSTRUCTIONS:   Return to the emergency department if:   You have trouble breathing or wheezing that is getting worse  You have a stiff neck, a fever, or a bad headache  You cannot open your eye  Your eyeball bulges out or you cannot move your eye  You are more sleepy than normal, or you notice changes in your ability to think, move, or talk  You have swelling of your forehead or scalp  Call your doctor if:   You have vision changes, such as double vision  Your eye and eyelid are red, swollen, and painful  Your symptoms do not improve or go away after 10 days  You have nausea and are vomiting  Your nose is bleeding  You have questions or concerns about your condition or care  Medicines: Your symptoms may go away on their own  Your healthcare provider may recommend watchful waiting for up to 10 days before starting antibiotics  You may need any of the following:  Acetaminophen  decreases pain and fever  It is available without a doctor's order  Ask how much to take and how often to take it  Follow directions  Read the labels of all other medicines you are using to see if they also contain acetaminophen, or ask your doctor or pharmacist  Acetaminophen can cause liver damage if not taken correctly  Do not use more than 4 grams (4,000 milligrams) total of acetaminophen in one day  NSAIDs , such as ibuprofen, help decrease swelling, pain, and fever  This medicine is available with or without a doctor's order  NSAIDs can cause stomach bleeding or kidney problems in certain people   If you take blood thinner medicine, always ask your healthcare provider if NSAIDs are safe for you  Always read the medicine label and follow directions  Nasal steroid sprays  may help decrease inflammation in your nose and sinuses  Decongestants  help reduce swelling and drain mucus in the nose and sinuses  They may help you breathe easier  Antihistamines  help dry mucus in the nose and relieve sneezing  Antibiotics  help treat or prevent a bacterial infection  Take your medicine as directed  Contact your healthcare provider if you think your medicine is not helping or if you have side effects  Tell him or her if you are allergic to any medicine  Keep a list of the medicines, vitamins, and herbs you take  Include the amounts, and when and why you take them  Bring the list or the pill bottles to follow-up visits  Carry your medicine list with you in case of an emergency  Self-care:   Rinse your sinuses as directed  Use a sinus rinse device to rinse your nasal passages with a saline (salt water) solution or distilled water  Do not use tap water  This will help thin the mucus in your nose and rinse away pollen and dirt  It will also help reduce swelling so you can breathe normally  Use a humidifier  to increase air moisture in your home  This may make it easier for you to breathe and help decrease your cough  Sleep with your head elevated  Place an extra pillow under your head before you go to sleep to help your sinuses drain  Drink liquids as directed  Ask your healthcare provider how much liquid to drink each day and which liquids are best for you  Liquids will thin the mucus in your nose and help it drain  Avoid drinks that contain alcohol or caffeine  Do not smoke, and avoid secondhand smoke  Nicotine and other chemicals in cigarettes and cigars can make your symptoms worse  Ask your healthcare provider for information if you currently smoke and need help to quit  E-cigarettes or smokeless tobacco still contain nicotine   Talk to your healthcare provider before you use these products  Prevent the spread of germs:   Wash your hands often with soap and water  Wash your hands after you use the bathroom, change a child's diaper, or sneeze  Wash your hands before you prepare or eat food  Stay away from people who are sick  Some germs spread easily and quickly through contact  Follow up with your doctor as directed: You may be referred to an ear, nose, and throat specialist  Write down your questions so you remember to ask them during your visits  © Copyright [employer] 2022 Information is for End User's use only and may not be sold, redistributed or otherwise used for commercial purposes  All illustrations and images included in CareNotes® are the copyrighted property of A D A M , Inc  or [facility] Emma Pichardo   The above information is an  only  It is not intended as medical advice for individual conditions or treatments  Talk to your doctor, nurse or pharmacist before following any medical regimen to see if it is safe and effective for you

## 2022-11-20 NOTE — CARE ANYWHERE EVISITS
Visit Summary for Perez Acuna Her - Gender: Female - Date of Birth: [id number]  Date: [id number] - Duration: 9 minutes  Patient: Perez Acuna Her  Provider: Samantha Suarez PA-C    Patient Contact Information  Address  [address]; [place]  [phone]    Visit Topics    Triage Questions   What is your current physical address in the event of a medical emergency? Answer []  Are you allergic to any medications? Answer []  Are you now or could you be pregnant? Answer []  Do you have any immune system compromise or chronic lung   disease? Answer []  Do you have any vulnerable family members in the home (infant, pregnant, cancer, elderly)? Answer []     Conversation Transcripts  [0A][0A] [Notification] You are connected with Samantha Suarez PA-C, Urgent Care Specialist [0A][Notification] [facility] is located in [place]  [0A][Notification] [facility] has shared health history  [place]  [0A]    Diagnosis  Acute maxillary sinusitis    Procedures  Value: [id number] Code: CPT-4 UNLISTED E&M SERVICE    Medications Prescribed    No prescriptions ordered    Electronically signed by: Bernie Manning(NPI [id number])

## 2022-11-20 NOTE — PROGRESS NOTES
Video Visit - Jovanny Quach 63 y o  female MRN: [id number]    REQUIRED DOCUMENTATION:         1  [facility]  2  Provider located at [address] [place] [address] Debra Lux [id number] [phone]  3  [facility] provider: Ursula Rincon PA-C   4  Identify all parties in room with patient during [facility] visit:  No one else  5  After connecting through [employer], patient was identified by name and date of birth  Patient was then informed that this was a Telemedicine visit and that the exam was being conducted confidentially over secure lines  My office door was closed  No one else was in the room  Patient acknowledged consent and understanding of privacy and security of the Telemedicine visit  I informed the patient that I have reviewed their record in Epic and presented the opportunity for them to ask any questions regarding the visit today  The patient agreed to participate  VITALS: Heart Rate: 76 BPM, Respiratory Rate: 12 RPM, Temperature 98 3° F, Blood Pressure Unavailable mmHg, Pulse Ox Unavailable % on RA    HPI  Pt reports sinus infection similar to prior  Usually able to control with OTC but not helping this time  Losing voice, sinus pressure/pain, ears hurt, teeth hurt, slight cough, losing voice x 2 weeks  Flonase, claritin, advil cold and sinus without relief  Hasn't had abx since February  Physical Exam  Constitutional:       General: She is not in acute distress  Appearance: Normal appearance  She is not toxic-appearing  HENT:      Head: Normocephalic and atraumatic  Nose: No rhinorrhea  Mouth/Throat:      Mouth: Mucous membranes are moist    Eyes:      Conjunctiva/sclera: Conjunctivae normal    Pulmonary:      Effort: Pulmonary effort is normal  No respiratory distress  Breath sounds: No wheezing (no gross audible wheeze through computer)  Musculoskeletal:      Cervical back: Normal range of motion     Skin:     Findings: No rash (on face or neck)  Neurological:      Mental Status: She is alert  Cranial Nerves: No dysarthria or facial asymmetry  Psychiatric:         Mood and Affect: Mood normal          Behavior: Behavior normal          Diagnoses and all orders for this visit:    Acute non-recurrent maxillary sinusitis  -     amoxicillin-clavulanate (AUGMENTIN) 875-125 mg per tablet; Take 1 tablet by mouth 2 (two) times a day for 10 days      Patient Instructions     Sinusitis   WHAT YOU NEED TO KNOW:   Sinusitis is inflammation or infection of your sinuses  Sinusitis is most often caused by a virus  Acute sinusitis may last up to 12 weeks  Chronic sinusitis lasts longer than 12 weeks  Recurrent sinusitis means you have 4 or more infections in 1 year  DISCHARGE INSTRUCTIONS:   Return to the emergency department if:   · You have trouble breathing or wheezing that is getting worse  · You have a stiff neck, a fever, or a bad headache  · You cannot open your eye  · Your eyeball bulges out or you cannot move your eye  · You are more sleepy than normal, or you notice changes in your ability to think, move, or talk  · You have swelling of your forehead or scalp  Call your doctor if:   · You have vision changes, such as double vision  · Your eye and eyelid are red, swollen, and painful  · Your symptoms do not improve or go away after 10 days  · You have nausea and are vomiting  · Your nose is bleeding  · You have questions or concerns about your condition or care  Medicines: Your symptoms may go away on their own  Your healthcare provider may recommend watchful waiting for up to 10 days before starting antibiotics  You may need any of the following:  · Acetaminophen  decreases pain and fever  It is available without a doctor's order  Ask how much to take and how often to take it  Follow directions   Read the labels of all other medicines you are using to see if they also contain acetaminophen, or ask your doctor or pharmacist  Acetaminophen can cause liver damage if not taken correctly  Do not use more than 4 grams (4,000 milligrams) total of acetaminophen in one day  · NSAIDs , such as ibuprofen, help decrease swelling, pain, and fever  This medicine is available with or without a doctor's order  NSAIDs can cause stomach bleeding or kidney problems in certain people  If you take blood thinner medicine, always ask your healthcare provider if NSAIDs are safe for you  Always read the medicine label and follow directions  · Nasal steroid sprays  may help decrease inflammation in your nose and sinuses  · Decongestants  help reduce swelling and drain mucus in the nose and sinuses  They may help you breathe easier  · Antihistamines  help dry mucus in the nose and relieve sneezing  · Antibiotics  help treat or prevent a bacterial infection  · Take your medicine as directed  Contact your healthcare provider if you think your medicine is not helping or if you have side effects  Tell him or her if you are allergic to any medicine  Keep a list of the medicines, vitamins, and herbs you take  Include the amounts, and when and why you take them  Bring the list or the pill bottles to follow-up visits  Carry your medicine list with you in case of an emergency  Self-care:   · Rinse your sinuses as directed  Use a sinus rinse device to rinse your nasal passages with a saline (salt water) solution or distilled water  Do not use tap water  This will help thin the mucus in your nose and rinse away pollen and dirt  It will also help reduce swelling so you can breathe normally  · Use a humidifier  to increase air moisture in your home  This may make it easier for you to breathe and help decrease your cough  · Sleep with your head elevated  Place an extra pillow under your head before you go to sleep to help your sinuses drain  · Drink liquids as directed    Ask your healthcare provider how much liquid to drink each day and which liquids are best for you  Liquids will thin the mucus in your nose and help it drain  Avoid drinks that contain alcohol or caffeine  · Do not smoke, and avoid secondhand smoke  Nicotine and other chemicals in cigarettes and cigars can make your symptoms worse  Ask your healthcare provider for information if you currently smoke and need help to quit  E-cigarettes or smokeless tobacco still contain nicotine  Talk to your healthcare provider before you use these products  Prevent the spread of germs:   · Wash your hands often with soap and water  Wash your hands after you use the bathroom, change a child's diaper, or sneeze  Wash your hands before you prepare or eat food  · Stay away from people who are sick  Some germs spread easily and quickly through contact  Follow up with your doctor as directed: You may be referred to an ear, nose, and throat specialist  Write down your questions so you remember to ask them during your visits  © Copyright [employer] 2022 Information is for End User's use only and may not be sold, redistributed or otherwise used for commercial purposes  All illustrations and images included in CareNotes® are the copyrighted property of [employer]  or [address] [place], [place] [address]tania nadja   The above information is an  only  It is not intended as medical advice for individual conditions or treatments  Talk to your doctor, nurse or pharmacist before following any medical regimen to see if it is safe and effective for you

## 2022-11-30 ENCOUNTER — VBI (OUTPATIENT)
Dept: ADMINISTRATIVE | Facility: OTHER | Age: 62
End: 2022-11-30

## 2022-12-16 DIAGNOSIS — F41.8 SITUATIONAL ANXIETY: ICD-10-CM

## 2023-01-04 ENCOUNTER — OFFICE VISIT (OUTPATIENT)
Dept: FAMILY MEDICINE CLINIC | Facility: CLINIC | Age: 63
End: 2023-01-04

## 2023-01-04 VITALS
WEIGHT: 172.8 LBS | HEART RATE: 102 BPM | TEMPERATURE: 97.8 F | DIASTOLIC BLOOD PRESSURE: 86 MMHG | OXYGEN SATURATION: 97 % | BODY MASS INDEX: 34.84 KG/M2 | HEIGHT: 59 IN | SYSTOLIC BLOOD PRESSURE: 124 MMHG

## 2023-01-04 DIAGNOSIS — I10 PRIMARY HYPERTENSION: ICD-10-CM

## 2023-01-04 DIAGNOSIS — Z12.11 SCREENING FOR COLON CANCER: ICD-10-CM

## 2023-01-04 DIAGNOSIS — Z12.31 SCREENING MAMMOGRAM FOR BREAST CANCER: ICD-10-CM

## 2023-01-04 DIAGNOSIS — F41.8 SITUATIONAL ANXIETY: ICD-10-CM

## 2023-01-04 DIAGNOSIS — Z00.00 ANNUAL PHYSICAL EXAM: Primary | ICD-10-CM

## 2023-01-04 RX ORDER — HYDROCHLOROTHIAZIDE 25 MG/1
25 TABLET ORAL DAILY
Qty: 90 TABLET | Refills: 3 | Status: SHIPPED | OUTPATIENT
Start: 2023-01-04

## 2023-01-04 RX ADMIN — CYANOCOBALAMIN 1000 MCG: 1000 INJECTION, SOLUTION INTRAMUSCULAR; SUBCUTANEOUS at 08:11

## 2023-01-04 NOTE — PROGRESS NOTES
[id number] [address] [place] [address]    NAME: Alejandra Scanlon  AGE: 63 y o  SEX: female  : 1960     DATE: 2023     Assessment and Plan:     Problem List Items Addressed This Visit        Cardiovascular and Mediastinum    Primary hypertension    Relevant Medications    hydrochlorothiazide (HYDRODIURIL) 25 mg tablet       Other    Situational anxiety    Relevant Medications    sertraline (ZOLOFT) 50 mg tablet   Other Visit Diagnoses     Annual physical exam    -  Primary    Screening for colon cancer        Relevant Orders    Ambulatory Referral to Gastroenterology    Screening mammogram for breast cancer        Relevant Orders    Mammo screening bilateral w 3d & cad          Immunizations and preventive care screenings were discussed with patient today  Appropriate education was printed on patient's after visit summary  Counseling:  Alcohol/drug use: discussed moderation in alcohol intake, the recommendations for healthy alcohol use, and avoidance of illicit drug use  Dental Health: discussed importance of regular tooth brushing, flossing, and dental visits  Exercise: the importance of regular exercise/physical activity was discussed  Recommend exercise 3-5 times per week for at least 30 minutes  BMI Counseling: Body mass index is 34 9 kg/m²  The BMI is above normal  Nutrition recommendations include encouraging healthy choices of fruits and vegetables  Exercise recommendations include exercising 3-5 times per week  No pharmacotherapy was ordered  Rationale for BMI follow-up plan is due to patient being overweight or obese  Return in about 1 year (around 2024) for Annual physical      Chief Complaint:     No chief complaint on file  History of Present Illness:     Adult Annual Physical   Patient here for a comprehensive physical exam  The patient reports no problems  Needs refills on medications   Doing well on HCTZ, Sertraline  Diet and Physical Activity  Diet/Nutrition: well balanced diet  Exercise: no formal exercise  Active around the house  Depression Screening  PHQ-2/9 Depression Screening    Little interest or pleasure in doing things: 0 - not at all  Feeling down, depressed, or hopeless: 0 - not at all  PHQ-2 Score: 0  PHQ-2 Interpretation: Negative depression screen       General Health  Sleep: gets 7-8 hours of sleep on average  Hearing: no issues   Vision: goes for regular eye exams and wears glasses  Dental: no dental visits for >1 year and brushes teeth twice daily  /GYN Health  Patient is: postmenopausal  Mammogram is due  Colonoscopy is due       Review of Systems:     Review of Systems   Constitutional: Negative for activity change, appetite change, chills, fatigue, fever and unexpected weight change  HENT: Negative for congestion, ear discharge, ear pain, postnasal drip, sinus pressure and sore throat  Eyes: Negative for discharge and visual disturbance  Respiratory: Negative for cough, shortness of breath and wheezing  Cardiovascular: Negative for chest pain, palpitations and leg swelling  Gastrointestinal: Negative for abdominal pain, constipation, diarrhea, nausea and vomiting  Endocrine: Negative for cold intolerance, heat intolerance, polydipsia and polyuria  Genitourinary: Negative for difficulty urinating and frequency  Musculoskeletal: Negative for arthralgias, back pain, joint swelling and myalgias  Skin: Negative for rash  Neurological: Negative for dizziness, weakness, light-headedness, numbness and headaches  Hematological: Negative for adenopathy  Psychiatric/Behavioral: Negative for behavioral problems, confusion, dysphoric mood, sleep disturbance and suicidal ideas  The patient is not nervous/anxious         Past Medical History:     Past Medical History:   Diagnosis Date   • Seasonal allergies    • Sinus congestion     developed nasal congestion and cough 19- is going to call PCP today for RX; advised to call surgeon if not better next week     • Skin lesion of left lower extremity 2019   • Torn medial meniscus     2016  last assessed      Past Surgical History:     Past Surgical History:   Procedure Laterality Date   •  SECTION      x2   • COLONOSCOPY     • KNEE ARTHROSCOPY, MEDIAL PATELLO FEMORAL LIGAMENT REPAIR      patellar closed   • KNEE SURGERY      with medial meniscus repair   • CO ARTHRP KNE CONDYLE&PLATU MEDIAL&LAT COMPARTMENTS Right 2020    Procedure: ARTHROPLASTY KNEE TOTAL;  Surgeon: Jody Wood DO;  Location: [facility];  Service: Orthopedics   • REPLACEMENT TOTAL KNEE        Social History:     Social History     Socioeconomic History   • Marital status: /Civil Union     Spouse name: None   • Number of children: None   • Years of education: None   • Highest education level: None   Occupational History   • None   Tobacco Use   • Smoking status: Never   • Smokeless tobacco: Never   Vaping Use   • Vaping Use: Never used   Substance and Sexual Activity   • Alcohol use: Yes     Comment: social drinker in all scripts   • Drug use: No   • Sexual activity: None   Other Topics Concern   • None   Social History Narrative   • None     Social Determinants of Health     Financial Resource Strain: Not on file   Food Insecurity: Not on file   Transportation Needs: Not on file   Physical Activity: Not on file   Stress: Not on file   Social Connections: Not on file   Intimate Partner Violence: Not on file   Housing Stability: Not on file      Family History:     Family History   Problem Relation Age of Onset   • Hypertension Mother    • Lymphoma Mother 66   • No Known Problems Father    • Colon cancer Maternal Grandfather [de-identified]   • No Known Problems Daughter    • No Known Problems Maternal Grandmother    • No Known Problems Paternal Grandmother    • Pancreatic cancer Maternal Aunt 74   • No Known Problems Daughter    • No Known Problems Paternal Aunt    • Breast cancer Neg Hx       Current Medications:     Current Outpatient Medications   Medication Sig Dispense Refill   • fluticasone (FLONASE) 50 mcg/act nasal spray 1 spray into each nostril daily     • hydrochlorothiazide (HYDRODIURIL) 25 mg tablet Take 1 tablet (25 mg total) by mouth daily 90 tablet 3   • loratadine (CLARITIN) 10 mg tablet Take 10 mg by mouth daily     • sertraline (ZOLOFT) 50 mg tablet Take 1 tablet (50 mg total) by mouth daily 90 tablet 3     Current Facility-Administered Medications   Medication Dose Route Frequency Provider Last Rate Last Admin   • cyanocobalamin injection 1,000 mcg  1,000 mcg Intramuscular Q30 Days Nacho Mckee MD   1,000 mcg at 01/04/23 9680      Allergies:     No Known Allergies   Physical Exam:     /86 (BP Location: Left arm, Patient Position: Sitting)   Pulse 102   Temp 97 8 °F (36 6 °C) (Temporal)   Ht 4' 11" (1 499 m)   Wt 78 4 kg (172 lb 12 8 oz)   LMP 09/01/2010 (Approximate)   SpO2 97%   BMI 34 90 kg/m²     Physical Exam  Constitutional:       General: She is not in acute distress  Appearance: Normal appearance  She is well-developed  She is not ill-appearing, toxic-appearing or diaphoretic  HENT:      Head: Normocephalic and atraumatic  Right Ear: External ear normal       Left Ear: External ear normal       Mouth/Throat:      Pharynx: No oropharyngeal exudate  Eyes:      General: No scleral icterus  Right eye: No discharge  Left eye: No discharge  Conjunctiva/sclera: Conjunctivae normal       Pupils: Pupils are equal, round, and reactive to light  Neck:      Thyroid: No thyromegaly  Cardiovascular:      Rate and Rhythm: Normal rate and regular rhythm  Heart sounds: Normal heart sounds  No murmur heard  No friction rub  No gallop  Pulmonary:      Effort: Pulmonary effort is normal  No respiratory distress  Breath sounds: Normal breath sounds  No wheezing or rales     Chest: Chest wall: No tenderness  Abdominal:      General: Bowel sounds are normal  There is no distension  Palpations: Abdomen is soft  There is no mass  Tenderness: There is no abdominal tenderness  There is no guarding or rebound  Hernia: No hernia is present  Musculoskeletal:         General: No swelling  Lymphadenopathy:      Cervical: No cervical adenopathy  Skin:     General: Skin is warm  Findings: No rash  Neurological:      General: No focal deficit present  Mental Status: She is alert and oriented to person, place, and time  Mental status is at baseline  Cranial Nerves: No cranial nerve deficit  Psychiatric:         Mood and Affect: Mood normal          Behavior: Behavior normal          Thought Content:  Thought content normal          Judgment: Judgment normal           Alexandru Monroe MD  [address] [place] [address] 95

## 2023-01-23 ENCOUNTER — TELEPHONE (OUTPATIENT)
Dept: FAMILY MEDICINE CLINIC | Facility: CLINIC | Age: 63
End: 2023-01-23

## 2023-01-23 NOTE — TELEPHONE ENCOUNTER
Pt notifed and she is good with that   I did advise if Dr Nic Tariq had a reason for it to give him a call to check

## 2023-01-23 NOTE — TELEPHONE ENCOUNTER
Pt is having dental work on Wednesday  She had a knee replacement  She needs antibiotics - called [facility]     Call pt if a problem they will check [facility]

## 2023-01-23 NOTE — TELEPHONE ENCOUNTER
Per the [place] [employer]:   In patients with prosthetic joint implants, a January 2015 ADA clinical practice guideline, based on a 2014 systematic review states, “In general, for patients with prosthetic joint implants, prophylactic antibiotics are not recommended prior to dental procedures to prevent prosthetic joint infection ”

## 2023-03-30 ENCOUNTER — VBI (OUTPATIENT)
Dept: ADMINISTRATIVE | Facility: OTHER | Age: 63
End: 2023-03-30

## 2023-04-03 NOTE — TELEPHONE ENCOUNTER
Patient called feeling that she is starting to get a sinus infection  Please call her back ASAP about a telephone visit 
[email or web address] - still need visit?
4 = No assist / stand by assistance

## 2023-05-10 ENCOUNTER — CLINICAL SUPPORT (OUTPATIENT)
Dept: FAMILY MEDICINE CLINIC | Facility: CLINIC | Age: 63
End: 2023-05-10

## 2023-05-10 ENCOUNTER — APPOINTMENT (OUTPATIENT)
Dept: LAB | Facility: CLINIC | Age: 63
End: 2023-05-10

## 2023-05-10 DIAGNOSIS — I10 PRIMARY HYPERTENSION: ICD-10-CM

## 2023-05-10 DIAGNOSIS — Z13.220 LIPID SCREENING: ICD-10-CM

## 2023-05-10 DIAGNOSIS — E53.8 VITAMIN B12 DEFICIENCY: ICD-10-CM

## 2023-05-10 DIAGNOSIS — D64.9 ANEMIA, UNSPECIFIED TYPE: ICD-10-CM

## 2023-05-10 LAB
ALBUMIN SERPL BCP-MCNC: 3.9 G/DL (ref 3.5–5)
ALP SERPL-CCNC: 80 U/L (ref 46–116)
ALT SERPL W P-5'-P-CCNC: 27 U/L (ref 12–78)
ANION GAP SERPL CALCULATED.3IONS-SCNC: 1 MMOL/L (ref 4–13)
AST SERPL W P-5'-P-CCNC: 19 U/L (ref 5–45)
BASOPHILS # BLD AUTO: 0.03 THOUSANDS/ÂΜL (ref 0–0.1)
BASOPHILS NFR BLD AUTO: 1 % (ref 0–1)
BILIRUB SERPL-MCNC: 0.49 MG/DL (ref 0.2–1)
BUN SERPL-MCNC: 14 MG/DL (ref 5–25)
CALCIUM SERPL-MCNC: 10 MG/DL (ref 8.3–10.1)
CHLORIDE SERPL-SCNC: 104 MMOL/L (ref 96–108)
CHOLEST SERPL-MCNC: 250 MG/DL
CO2 SERPL-SCNC: 32 MMOL/L (ref 21–32)
CREAT SERPL-MCNC: 0.82 MG/DL (ref 0.6–1.3)
EOSINOPHIL # BLD AUTO: 0.12 THOUSAND/ÂΜL (ref 0–0.61)
EOSINOPHIL NFR BLD AUTO: 3 % (ref 0–6)
ERYTHROCYTE [DISTWIDTH] IN BLOOD BY AUTOMATED COUNT: 12.9 % (ref 11.6–15.1)
GFR SERPL CREATININE-BSD FRML MDRD: 77 ML/MIN/1.73SQ M
GLUCOSE P FAST SERPL-MCNC: 95 MG/DL (ref 65–99)
HCT VFR BLD AUTO: 40.7 % (ref 34.8–46.1)
HDLC SERPL-MCNC: 73 MG/DL
HGB BLD-MCNC: 12.9 G/DL (ref 11.5–15.4)
IMM GRANULOCYTES # BLD AUTO: 0 THOUSAND/UL (ref 0–0.2)
IMM GRANULOCYTES NFR BLD AUTO: 0 % (ref 0–2)
LDLC SERPL CALC-MCNC: 152 MG/DL (ref 0–100)
LYMPHOCYTES # BLD AUTO: 1.33 THOUSANDS/ÂΜL (ref 0.6–4.47)
LYMPHOCYTES NFR BLD AUTO: 32 % (ref 14–44)
MCH RBC QN AUTO: 29.1 PG (ref 26.8–34.3)
MCHC RBC AUTO-ENTMCNC: 31.7 G/DL (ref 31.4–37.4)
MCV RBC AUTO: 92 FL (ref 82–98)
MONOCYTES # BLD AUTO: 0.31 THOUSAND/ÂΜL (ref 0.17–1.22)
MONOCYTES NFR BLD AUTO: 8 % (ref 4–12)
NEUTROPHILS # BLD AUTO: 2.33 THOUSANDS/ÂΜL (ref 1.85–7.62)
NEUTS SEG NFR BLD AUTO: 56 % (ref 43–75)
NRBC BLD AUTO-RTO: 0 /100 WBCS
PLATELET # BLD AUTO: 272 THOUSANDS/UL (ref 149–390)
PMV BLD AUTO: 11.2 FL (ref 8.9–12.7)
POTASSIUM SERPL-SCNC: 3.6 MMOL/L (ref 3.5–5.3)
PROT SERPL-MCNC: 7.5 G/DL (ref 6.4–8.4)
RBC # BLD AUTO: 4.43 MILLION/UL (ref 3.81–5.12)
SODIUM SERPL-SCNC: 137 MMOL/L (ref 135–147)
TRIGL SERPL-MCNC: 126 MG/DL
WBC # BLD AUTO: 4.12 THOUSAND/UL (ref 4.31–10.16)

## 2023-05-10 RX ADMIN — CYANOCOBALAMIN 1000 MCG: 1000 INJECTION, SOLUTION INTRAMUSCULAR; SUBCUTANEOUS at 08:30

## 2023-06-09 ENCOUNTER — VBI (OUTPATIENT)
Dept: ADMINISTRATIVE | Facility: OTHER | Age: 63
End: 2023-06-09

## 2023-07-21 ENCOUNTER — VBI (OUTPATIENT)
Dept: ADMINISTRATIVE | Facility: OTHER | Age: 63
End: 2023-07-21

## 2023-07-26 NOTE — PROGRESS NOTES
Odalys Morrissey 1960 female MRN: [id number]      ASSESSMENT/PLAN  Problem List Items Addressed This Visit        Cardiovascular and Mediastinum    Primary hypertension - Primary   Other Visit Diagnoses     Lesion of skin of cheek        Relevant Orders    Ambulatory Referral to Dermatology    Ambulatory Referral to Dermatology    Weight gain        Relevant Orders    Ambulatory referral to Weight Management    BMI 36.0-36.9,adult        Relevant Orders    Ambulatory referral to Weight Management    Acute non-recurrent sinusitis, unspecified location        Relevant Medications    amoxicillin-clavulanate (AUGMENTIN) 875-125 mg per tablet    Screen for colon cancer        Relevant Orders    Ambulatory referral to Gastroenterology        BP within normal range, continue current dosing. Will cover for sinusitis with Augmentin given persistence of symptoms. Reviewed possible ADRs including GI upset, encouraged yogurt and/or probiotic. Discussed that occipital headaches may be more tension in nature -- pt has palpable spasm in both upper trapezius. Will refer to Derm for further evaluation of cheek lesion given change per pt, though appear reasonably benign   Will refer to Medical Weight Management to discuss possible lifestyle changes and/or medication options to assist with weight loss     CRC DUE -- agreeable to colonoscopy       Future Appointments   Date Time Provider [facility] [address]   9/6/2023  8:40 AM [address] [place] [address] [facility]          SUBJECTIVE  CC: Cough (Started 7/14, had pain in the back of head and top which was unusual ), Hypertension (May be elevated since discontinuing Zoloft ), and Weight Gain      HPI:  Odalys Morrissey is a 66 y.o. female who presents due to multiple concerns as detailed below. Had previously been taking Zoloft, and a few weeks ago had forgotten to take it for a few days and felt fine, so she stayed off of it.  On 7/14 started getting headaches -- getting regular "sinus infection" symptoms but also getting headaches in the back of her head and is wondering if she needs to increase her BP med   Since  Has nasal congestion and dry cough, feels a "knot" in the back of her of her throat, dull throbbing pain in her ears. No GI upset. Did not test for COVID. Takes Claritin, Flonase daily. Has a spot on her L cheek -- has been present for some time, but has gotten a bit bigger and has more texture. Used to tan. Notes that her weight has increased "significantly" -- eats very little bread, enjoys salads/fruit, snacks on yogurt, does eat chips but not to excess. Has two knee replacements, so as she gains weight, she gets more pain. Has osteoporosis, not currently on treatment for it, because she is waiting on a dental implant. States she moves around, "works in the [employer]", is "constantly on the good"     Review of Systems   Constitutional: Positive for unexpected weight change. Negative for fever. HENT: Positive for congestion, ear pain and sore throat. Negative for hearing loss and rhinorrhea. Respiratory: Positive for cough (dry). Negative for shortness of breath. Gastrointestinal: Negative for abdominal pain, diarrhea and vomiting. Skin:        (+) skin lesion   Neurological: Positive for headaches. Historical Information   The patient history was reviewed and updated as follows:    Past Medical History:   Diagnosis Date   • Seasonal allergies    • Sinus congestion     developed nasal congestion and cough 19- is going to call PCP today for RX; advised to call surgeon if not better next week.    • Skin lesion of left lower extremity 2019   • Torn medial meniscus     2016  last assessed     Past Surgical History:   Procedure Laterality Date   •  SECTION      x2   • COLONOSCOPY     • KNEE ARTHROSCOPY, MEDIAL PATELLO FEMORAL LIGAMENT REPAIR      patellar closed   • KNEE SURGERY      with medial meniscus repair   • [place] ARTHRP KNE CONDYLE&PLATU MEDIAL&LAT COMPARTMENTS Right 1/20/2020    Procedure: ARTHROPLASTY KNEE TOTAL;  Surgeon: Dipti Khan DO;  Location: [facility];  Service: Orthopedics   • REPLACEMENT TOTAL KNEE       Family History   Problem Relation Age of Onset   • Hypertension Mother    • Lymphoma Mother 66   • No Known Problems Father    • Colon cancer Maternal Grandfather 80   • No Known Problems Daughter    • No Known Problems Maternal Grandmother    • No Known Problems Paternal Grandmother    • Pancreatic cancer Maternal Aunt 74   • No Known Problems Daughter    • No Known Problems Paternal Aunt    • Breast cancer Neg Hx       Social History   Social History     Substance and Sexual Activity   Alcohol Use Yes    Comment: social drinker in all scripts     Social History     Substance and Sexual Activity   Drug Use No     Social History     Tobacco Use   Smoking Status Never   Smokeless Tobacco Never       Medications:     Current Outpatient Medications:   •  amoxicillin-clavulanate (AUGMENTIN) 875-125 mg per tablet, Take 1 tablet by mouth every 12 (twelve) hours for 7 days, Disp: 14 tablet, Rfl: 0  •  fluticasone (FLONASE) 50 mcg/act nasal spray, 1 spray into each nostril daily, Disp: , Rfl:   •  hydrochlorothiazide (HYDRODIURIL) 25 mg tablet, Take 1 tablet (25 mg total) by mouth daily, Disp: 90 tablet, Rfl: 3  •  loratadine (CLARITIN) 10 mg tablet, Take 10 mg by mouth daily, Disp: , Rfl:     Current Facility-Administered Medications:   •  cyanocobalamin injection 1,000 mcg, 1,000 mcg, Intramuscular, Q30 Days, Bismark Barnes MD, 1,000 mcg at 05/10/23 0830  No Known Allergies    OBJECTIVE    Vitals:   Vitals:    07/27/23 1300   BP: 126/88   Pulse: 84   Temp: 98 °F (36.7 °C)   SpO2: 99%   Weight: 82.6 kg (182 lb)   Height: 4' 11" (1.499 m)           Physical Exam  Vitals and nursing note reviewed. Constitutional:       General: She is not in acute distress.      Appearance: Normal appearance. HENT:      Head: Normocephalic and atraumatic. Comments: Sub-centimeter round, slightly raised, rough, hyperpigmented lesion      Right Ear: Ear canal and external ear normal. There is impacted cerumen. Left Ear: Tympanic membrane, ear canal and external ear normal. Tympanic membrane is not erythematous, retracted or bulging. Nose: Rhinorrhea present. No mucosal edema. Right Sinus: Maxillary sinus tenderness and frontal sinus tenderness present. Left Sinus: Maxillary sinus tenderness and frontal sinus tenderness present. Mouth/Throat:      Mouth: Mucous membranes are moist.      Pharynx: No oropharyngeal exudate or posterior oropharyngeal erythema. Eyes:      Conjunctiva/sclera: Conjunctivae normal.   Cardiovascular:      Rate and Rhythm: Normal rate and regular rhythm. Pulmonary:      Effort: Pulmonary effort is normal. No respiratory distress. Breath sounds: Normal breath sounds. No decreased breath sounds, wheezing or rales. Abdominal:      General: Bowel sounds are normal. There is no distension. Palpations: Abdomen is soft. Tenderness: There is no abdominal tenderness. Musculoskeletal:      Comments: B/L upper trapezius tension    Lymphadenopathy:      Cervical: No cervical adenopathy. Skin:     General: Skin is warm and dry. Neurological:      General: No focal deficit present. Mental Status: She is alert.    Psychiatric:         Mood and Affect: Mood normal.                    Olivia Koehler,   [facility]   7/27/2023  1:34 PM

## 2023-07-27 ENCOUNTER — OFFICE VISIT (OUTPATIENT)
Dept: FAMILY MEDICINE CLINIC | Facility: CLINIC | Age: 63
End: 2023-07-27
Payer: COMMERCIAL

## 2023-07-27 VITALS
WEIGHT: 182 LBS | HEIGHT: 59 IN | TEMPERATURE: 98 F | BODY MASS INDEX: 36.69 KG/M2 | SYSTOLIC BLOOD PRESSURE: 126 MMHG | HEART RATE: 84 BPM | DIASTOLIC BLOOD PRESSURE: 88 MMHG | OXYGEN SATURATION: 99 %

## 2023-07-27 DIAGNOSIS — L98.9 LESION OF SKIN OF CHEEK: ICD-10-CM

## 2023-07-27 DIAGNOSIS — I10 PRIMARY HYPERTENSION: Primary | ICD-10-CM

## 2023-07-27 DIAGNOSIS — E53.8 VITAMIN B12 DEFICIENCY: ICD-10-CM

## 2023-07-27 DIAGNOSIS — J01.90 ACUTE NON-RECURRENT SINUSITIS, UNSPECIFIED LOCATION: ICD-10-CM

## 2023-07-27 DIAGNOSIS — R63.5 WEIGHT GAIN: ICD-10-CM

## 2023-07-27 DIAGNOSIS — Z12.11 SCREEN FOR COLON CANCER: ICD-10-CM

## 2023-07-27 PROCEDURE — 96372 THER/PROPH/DIAG INJ SC/IM: CPT | Performed by: FAMILY MEDICINE

## 2023-07-27 PROCEDURE — 99214 OFFICE O/P EST MOD 30 MIN: CPT | Performed by: FAMILY MEDICINE

## 2023-07-27 RX ORDER — AMOXICILLIN AND CLAVULANATE POTASSIUM 875; 125 MG/1; MG/1
1 TABLET, FILM COATED ORAL EVERY 12 HOURS SCHEDULED
Qty: 14 TABLET | Refills: 0 | Status: SHIPPED | OUTPATIENT
Start: 2023-07-27 | End: 2023-08-03

## 2023-07-27 RX ADMIN — CYANOCOBALAMIN 1000 MCG: 1000 INJECTION, SOLUTION INTRAMUSCULAR; SUBCUTANEOUS at 15:19

## 2023-09-13 ENCOUNTER — CLINICAL SUPPORT (OUTPATIENT)
Dept: FAMILY MEDICINE CLINIC | Facility: CLINIC | Age: 63
End: 2023-09-13
Payer: COMMERCIAL

## 2023-09-13 DIAGNOSIS — E53.8 VITAMIN B12 DEFICIENCY: ICD-10-CM

## 2023-09-13 PROCEDURE — 96372 THER/PROPH/DIAG INJ SC/IM: CPT | Performed by: FAMILY MEDICINE

## 2023-09-13 RX ADMIN — CYANOCOBALAMIN 1000 MCG: 1000 INJECTION, SOLUTION INTRAMUSCULAR; SUBCUTANEOUS at 08:22

## 2023-09-20 ENCOUNTER — TELEPHONE (OUTPATIENT)
Dept: GASTROENTEROLOGY | Facility: CLINIC | Age: 63
End: 2023-09-20

## 2023-09-20 ENCOUNTER — PREP FOR PROCEDURE (OUTPATIENT)
Dept: GASTROENTEROLOGY | Facility: CLINIC | Age: 63
End: 2023-09-20

## 2023-09-20 DIAGNOSIS — Z12.11 SCREENING FOR COLON CANCER: Primary | ICD-10-CM

## 2023-09-20 NOTE — TELEPHONE ENCOUNTER
Prep instructions sent via [employer].     Scheduled date of colonoscopy (as of today):10/25/23  Physician performing colonoscopy:Rupert  [facility] of colonoscopy[facility]  Bowel prep reviewed with patient:miralax/dulcolax  Instructions reviewed with patient by:Annita RASCON  Clearances: none

## 2023-09-20 NOTE — TELEPHONE ENCOUNTER
09/20/23  Screened by: Virgen Mcintosh   Scheduled 10/25/23 with Dr Andrzej martins instructions on My Chart    Referring Provider EMMA Koehler    Pre- Screening: There is no height or weight on file to calculate BMI. Has patient been referred for a routine screening Colonoscopy? yes  Is the patient between 43-73 years old? yes      Previous Colonoscopy yes   If yes:    Date: over 5 years ago    Facility: [place]    Reason:       SCHEDULING STAFF: If the patient is between 39yrs-51yrs, please advise patient to confirm benefits/coverage with their insurance company for a routine screening colonoscopy, some insurance carriers will only cover at [address] [place] [address] or older. If the patient is over 66years old, please schedule an office visit. Does the patient want to see a Gastroenterologist prior to their procedure OR are they having any GI symptoms? no    Has the patient been hospitalized or had abdominal surgery in the past 6 months? no    Does the patient use supplemental oxygen? no    Does the patient take Coumadin, Lovenox, Plavix, Elliquis, Xarelto, or other blood thinning medication? no    Has the patient had a stroke, cardiac event, or stent placed in the past year? no    SCHEDULING STAFF: If patient answers NO to above questions, then schedule procedure. If patient answers YES to above questions, then schedule office appointment. If patient is between 45yrs - 49yrs, please advise patient that we will have to confirm benefits & coverage with their insurance company for a routine screening colonoscopy.

## 2023-10-11 ENCOUNTER — CLINICAL SUPPORT (OUTPATIENT)
Dept: FAMILY MEDICINE CLINIC | Facility: CLINIC | Age: 63
End: 2023-10-11
Payer: COMMERCIAL

## 2023-10-11 DIAGNOSIS — E53.8 B12 DEFICIENCY: Primary | ICD-10-CM

## 2023-10-11 RX ADMIN — CYANOCOBALAMIN 1000 MCG: 1000 INJECTION, SOLUTION INTRAMUSCULAR; SUBCUTANEOUS at 11:03

## 2023-10-23 ENCOUNTER — TELEPHONE (OUTPATIENT)
Age: 63
End: 2023-10-23

## 2023-10-23 NOTE — TELEPHONE ENCOUNTER
Scheduled date of colonoscopy (as of today): 11/15/23  Physician performing colonoscopy: Karolina Aggarwal  Location of colonoscopy: [facility]  Bowel prep reviewed with patient: previously done  Instructions sent to [address] [place] [address]

## 2023-11-15 ENCOUNTER — ANESTHESIA EVENT (OUTPATIENT)
Dept: GASTROENTEROLOGY | Facility: HOSPITAL | Age: 63
End: 2023-11-15

## 2023-11-15 ENCOUNTER — ANESTHESIA (OUTPATIENT)
Dept: GASTROENTEROLOGY | Facility: HOSPITAL | Age: 63
End: 2023-11-15

## 2023-11-15 ENCOUNTER — HOSPITAL ENCOUNTER (OUTPATIENT)
Dept: GASTROENTEROLOGY | Facility: HOSPITAL | Age: 63
Setting detail: OUTPATIENT SURGERY
Discharge: HOME | End: 2023-11-15
Attending: INTERNAL MEDICINE
Payer: COMMERCIAL

## 2023-11-15 VITALS
WEIGHT: 180.56 LBS | TEMPERATURE: 97.5 F | HEIGHT: 60 IN | RESPIRATION RATE: 20 BRPM | OXYGEN SATURATION: 97 % | DIASTOLIC BLOOD PRESSURE: 58 MMHG | SYSTOLIC BLOOD PRESSURE: 113 MMHG | HEART RATE: 70 BPM | BODY MASS INDEX: 35.45 KG/M2

## 2023-11-15 DIAGNOSIS — Z12.11 SCREENING FOR COLON CANCER: ICD-10-CM

## 2023-11-15 PROBLEM — E66.812 CLASS 2 OBESITY WITH BODY MASS INDEX (BMI) OF 35.0 TO 35.9 IN ADULT: Status: ACTIVE | Noted: 2023-11-15

## 2023-11-15 PROBLEM — E66.9 CLASS 2 OBESITY WITH BODY MASS INDEX (BMI) OF 35.0 TO 35.9 IN ADULT: Status: ACTIVE | Noted: 2023-11-15

## 2023-11-15 PROCEDURE — 88305 TISSUE EXAM BY PATHOLOGIST: CPT | Performed by: PATHOLOGY

## 2023-11-15 PROCEDURE — 45385 COLONOSCOPY W/LESION REMOVAL: CPT | Performed by: INTERNAL MEDICINE

## 2023-11-15 RX ORDER — LIDOCAINE HYDROCHLORIDE 20 MG/ML
INJECTION, SOLUTION EPIDURAL; INFILTRATION; INTRACAUDAL; PERINEURAL AS NEEDED
Status: DISCONTINUED | OUTPATIENT
Start: 2023-11-15 | End: 2023-11-15

## 2023-11-15 RX ORDER — SODIUM CHLORIDE, SODIUM LACTATE, POTASSIUM CHLORIDE, CALCIUM CHLORIDE 600; 310; 30; 20 MG/100ML; MG/100ML; MG/100ML; MG/100ML
INJECTION, SOLUTION INTRAVENOUS CONTINUOUS PRN
Status: DISCONTINUED | OUTPATIENT
Start: 2023-11-15 | End: 2023-11-15

## 2023-11-15 RX ORDER — PROPOFOL 10 MG/ML
INJECTION, EMULSION INTRAVENOUS AS NEEDED
Status: DISCONTINUED | OUTPATIENT
Start: 2023-11-15 | End: 2023-11-15

## 2023-11-15 RX ADMIN — SODIUM CHLORIDE, SODIUM LACTATE, POTASSIUM CHLORIDE, AND CALCIUM CHLORIDE: .6; .31; .03; .02 INJECTION, SOLUTION INTRAVENOUS at 08:30

## 2023-11-15 RX ADMIN — PROPOFOL 120 MG: 10 INJECTION, EMULSION INTRAVENOUS at 08:58

## 2023-11-15 RX ADMIN — PROPOFOL 50 MG: 10 INJECTION, EMULSION INTRAVENOUS at 09:04

## 2023-11-15 RX ADMIN — PROPOFOL 30 MG: 10 INJECTION, EMULSION INTRAVENOUS at 09:00

## 2023-11-15 RX ADMIN — PROPOFOL 20 MG: 10 INJECTION, EMULSION INTRAVENOUS at 09:06

## 2023-11-15 RX ADMIN — LIDOCAINE HYDROCHLORIDE 100 MG: 20 INJECTION, SOLUTION EPIDURAL; INFILTRATION; INTRACAUDAL; PERINEURAL at 08:58

## 2023-11-15 NOTE — ANESTHESIA POSTPROCEDURE EVALUATION
Post-Op Assessment Note    Pain Score: 0    Pain management: adequate       Mental Status:  Sleepy   Hydration Status:  Stable   PONV Controlled:  None   Airway Patency:  Patent  Two or more mitigation strategies used for obstructive sleep apnea   Post Op Vitals Reviewed: Yes    No anethesia notable event occurred.     Staff: Anesthesiologist               BP   104/63   Temp     Pulse  71   Resp      SpO2   95%

## 2023-11-15 NOTE — ANESTHESIA PREPROCEDURE EVALUATION
Procedure:  COLONOSCOPY    Relevant Problems   CARDIO   (+) Primary hypertension      GI/HEPATIC   (+) Gastroesophageal reflux disease      NEURO/PSYCH   (+) Situational anxiety      Musculoskeletal and Integument   (+) Osteoporosis without current pathological fracture      Other   (+) Class 2 obesity with body mass index (BMI) of 35.0 to 35.9 in adult   (+) Iron deficiency   (+) S/P TKR (total knee replacement) using cement, right        Physical Exam    Airway    Mallampati score: II  TM Distance: >3 FB  Neck ROM: full     Dental       Cardiovascular      Pulmonary      Other Findings        Anesthesia Plan  ASA Score- 2     Anesthesia Type- IV sedation with anesthesia with ASA Monitors. Additional Monitors:     Airway Plan:     Comment: Recent labs personally reviewed:  Lab Results       Component                Value               Date                       WBC                      4.12 (L)            05/10/2023                 HGB                      12.9                05/10/2023                 PLT                      272                 05/10/2023            Lab Results       Component                Value               Date                       K                        3.6                 05/10/2023                 BUN                      14                  05/10/2023                 CREATININE               0.82                05/10/2023            Lab Results       Component                Value               Date                       PTT                      29                  12/26/2019             Lab Results       Component                Value               Date                       INR                      0.92                12/26/2019              Blood type Long Alma Amor MD, have personally seen and evaluated the patient prior to anesthetic care.   I have reviewed the pre-anesthetic record, medical history, allergies, medications and any other medical records if appropriate to the anesthetic care. If a CRNA is involved in the case, I have reviewed the CRNA assessment, if present, and agree. Patient consented for IV Sedation, general anesthesia as back up. Discussed risks of aspiration, IV infiltration, indications for conversion to general anesthesia. All questions and concerns addressed. .       Plan Factors-Exercise tolerance (METS): >4 METS. Chart reviewed. Existing labs reviewed. Patient summary reviewed. Patient is not a current smoker. Patient did not smoke on day of surgery. Obstructive sleep apnea risk education given perioperatively. Induction- intravenous. Postoperative Plan-     Informed Consent- Anesthetic plan and risks discussed with patient. I personally reviewed this patient with the CRNA. Discussed and agreed on the Anesthesia Plan with the CRNA. Landen Speaker

## 2023-11-15 NOTE — H&P
History and Physical -  [facility]  Bhavani Funez 66 y.o. female MRN: [id number]                  HPI: Bhavani Funez is a 66y.o. year old female who presents for colonoscopy for colon cancer screening. REVIEW OF SYSTEMS: Per the HPI, and otherwise unremarkable. Historical Information   Past Medical History:   Diagnosis Date    Seasonal allergies     Sinus congestion     developed nasal congestion and cough 19- is going to call PCP today for RX; advised to call surgeon if not better next week.     Skin lesion of left lower extremity 2019    Torn medial meniscus     2016  last assessed     Past Surgical History:   Procedure Laterality Date     SECTION      x2    COLONOSCOPY      KNEE ARTHROSCOPY, MEDIAL PATELLO FEMORAL LIGAMENT REPAIR      patellar closed    KNEE SURGERY      with medial meniscus repair    ID ARTHRP KNE CONDYLE&PLATU MEDIAL&LAT COMPARTMENTS Right 2020    Procedure: ARTHROPLASTY KNEE TOTAL;  Surgeon: Carmen Laws DO;  Location: [facility];  Service: Orthopedics    REPLACEMENT TOTAL KNEE       Social History   Social History     Substance and Sexual Activity   Alcohol Use Yes    Comment: social drinker in all scripts     Social History     Substance and Sexual Activity   Drug Use No     Social History     Tobacco Use   Smoking Status Never   Smokeless Tobacco Never     Family History   Problem Relation Age of Onset    Hypertension Mother     Lymphoma Mother 66    No Known Problems Father     Colon cancer Maternal Grandfather 80    No Known Problems Daughter     No Known Problems Maternal Grandmother     No Known Problems Paternal Grandmother     Pancreatic cancer Maternal Aunt 74    No Known Problems Daughter     No Known Problems Paternal Aunt     Breast cancer Neg Hx        Meds/Allergies       Current Outpatient Medications:     hydrochlorothiazide (HYDRODIURIL) 25 mg tablet    loratadine (CLARITIN) 10 mg tablet    fluticasone (FLONASE) 50 mcg/act nasal spray    Current Facility-Administered Medications:     cyanocobalamin injection 1,000 mcg, 1,000 mcg, Intramuscular, Q30 Days, 1,000 mcg at 10/11/23 1103    No Known Allergies    Objective     /70   Pulse 86   Temp (!) 97.3 °F (36.3 °C) (Temporal)   Resp 22   Ht 4' 11.5" (1.511 m)   Wt 81.9 kg (180 lb 8.9 oz)   LMP 09/01/2010 (Approximate)   SpO2 98%   BMI 35.86 kg/m²       PHYSICAL EXAM    Gen: NAD  Head: NCAT  CV: RRR  CHEST: Clear  ABD: soft, NT/ND  EXT: no edema      ASSESSMENT/PLAN:  Gabriele Stovall is a 66y.o. year old female who presents for colonoscopy for colon cancer screening. The patient is stable and optimized for the procedure, we reviewed risk and benefits. Risk include but not limited to infection, bleeding, perforation and missing a lesion.

## 2023-11-16 DIAGNOSIS — I10 PRIMARY HYPERTENSION: ICD-10-CM

## 2023-11-16 DIAGNOSIS — R63.5 WEIGHT GAIN: Primary | ICD-10-CM

## 2023-11-16 DIAGNOSIS — Z00.00 ANNUAL PHYSICAL EXAM: ICD-10-CM

## 2023-11-16 DIAGNOSIS — Z13.1 SCREENING FOR DIABETES MELLITUS: ICD-10-CM

## 2023-11-16 DIAGNOSIS — Z13.220 LIPID SCREENING: ICD-10-CM

## 2023-11-21 PROCEDURE — 88305 TISSUE EXAM BY PATHOLOGIST: CPT | Performed by: PATHOLOGY

## 2023-11-26 ENCOUNTER — AMB VIDEO VISIT (OUTPATIENT)
Dept: OTHER | Facility: HOSPITAL | Age: 63
End: 2023-11-26
Payer: COMMERCIAL

## 2023-11-26 VITALS — RESPIRATION RATE: 20 BRPM | TEMPERATURE: 98.6 F | HEART RATE: 84 BPM

## 2023-11-26 DIAGNOSIS — J01.01 ACUTE RECURRENT MAXILLARY SINUSITIS: Primary | ICD-10-CM

## 2023-11-26 PROCEDURE — 99212 OFFICE O/P EST SF 10 MIN: CPT | Performed by: PHYSICIAN ASSISTANT

## 2023-11-26 RX ORDER — AMOXICILLIN AND CLAVULANATE POTASSIUM 875; 125 MG/1; MG/1
1 TABLET, FILM COATED ORAL EVERY 12 HOURS SCHEDULED
Qty: 14 TABLET | Refills: 0 | Status: SHIPPED | OUTPATIENT
Start: 2023-11-26 | End: 2023-12-04

## 2023-11-26 NOTE — PROGRESS NOTES
Required Documentation:  Encounter provider Munira Mascorro PA-C    Provider located at [facility] [id number]    Identify all parties in room with patient during virtual visit:  No one else    The patient was identified by name and date of birth. Radu Holliday was informed that this is a telemedicine visit and that the visit is being conducted through the [address] [place] [address] Dr platform. She agrees to proceed. .  My office door was closed. No one else was in the room. She acknowledged consent and understanding of privacy and security of the video platform. The patient has agreed to participate and understands they can discontinue the visit at any time. Verification of patient location:    Patient is located at home in the following state in which I hold an active license [place]    Patient is aware this is a billable service. Reason for visit is No chief complaint on file. Subjective  HPI   9 days ago she reports she felt she began having another sinus infection. Had pain in face and congestion as well. Also has cough but no chest tightness wheezing chest pain. No recorded fevers or chills. Reports this feels like prior sinus infections. No visual changes or redness. No shortness of breath. Sore throat she feel is from coughing. Advil cold and sinus does not give limited relief but usually does, also taken flonase. Past Medical History:   Diagnosis Date   • Seasonal allergies    • Sinus congestion     developed nasal congestion and cough 19- is going to call PCP today for RX; advised to call surgeon if not better next week.    • Skin lesion of left lower extremity 2019   • Torn medial meniscus     2016  last assessed       Past Surgical History:   Procedure Laterality Date   •  SECTION      x2   • COLONOSCOPY     • KNEE ARTHROSCOPY, MEDIAL PATELLO FEMORAL LIGAMENT REPAIR      patellar closed   • KNEE SURGERY      with medial meniscus repair   • [place] ARTHRP KNE CONDYLE&PLATU MEDIAL&LAT COMPARTMENTS Right 1/20/2020    Procedure: ARTHROPLASTY KNEE TOTAL;  Surgeon: Chelsy Delaney DO;  Location: [facility];  Service: Orthopedics   • REPLACEMENT TOTAL KNEE          No Known Allergies    Review of Systems    Video Exam    Vitals:    11/26/23 0836   Pulse: 84   Resp: 20   Temp: 98.6 °F (37 °C)   TempSrc: Skin       Physical Exam    Visit Time  Total Visit Duration: 12 minutes    Assessment/Plan:    Diagnoses and all orders for this visit:    Acute recurrent maxillary sinusitis  -     amoxicillin-clavulanate (AUGMENTIN) 875-125 mg per tablet; Take 1 tablet by mouth every 12 (twelve) hours for 7 days    Patient is a 42-year-old female with signs and symptoms consistent with likely recurrent maxillary sinusitis based on her history. I did provide her with a prescription for Augmentin. Advised use of Flonase and Allen Spruce as well for supportive care. Advised if she develops any swelling or redness around the eyes any visual changes worsening fevers or associated severe symptoms to proceed to emergency department. I also advised her that if this infection has not resolved with antibiotics contact her PCP as she may require longer course or she may require in the future referral to an ENT or an allergist or both. Patient Instructions   Rhinosinusitis   WHAT YOU NEED TO KNOW:   Rhinosinusitis (RS) is inflammation or infection of your nasal passages and sinuses. RS is most often caused by a virus but may be caused by bacteria. Acute RS lasts up to 12 weeks. Chronic RS lasts more than 12 weeks. Recurrent RS means you have 4 or more infections in 1 year. DISCHARGE INSTRUCTIONS:   Return to the emergency department if:   You have trouble breathing, or wheezing that is getting worse. You have a stiff neck, a fever, or a bad headache. You cannot open your eye.     Your eyeball bulges out, or you cannot move your eye. You are more sleepy than usual, or you notice changes in your ability to think, move, or talk. You have swelling of your forehead or scalp. Call your doctor if:   You have vision changes, such as double vision. Your eye and eyelid are red, swollen, and painful. Your symptoms do not improve after 10 days. You have nausea and are vomiting. Your nose is bleeding. You have questions or concerns about your condition or care. Medicines: Your symptoms may go away on their own. Your healthcare provider may recommend watchful waiting for up to 10 days before starting antibiotics. Antibiotics will not help if the infection is caused by a virus. Check with your provider before you take any over-the-counter medicines. You may need any of the following:  Acetaminophen  decreases pain and fever. It is available without a doctor's order. Ask how much to take and how often to take it. Follow directions. Read the labels of all other medicines you are using to see if they also contain acetaminophen, or ask your doctor or pharmacist. Acetaminophen can cause liver damage if not taken correctly. NSAIDs , such as ibuprofen, help decrease swelling, pain, and fever. This medicine is available with or without a doctor's order. NSAIDs can cause stomach bleeding or kidney problems in certain people. If you take blood thinner medicine, always ask your healthcare provider if NSAIDs are safe for you. Always read the medicine label and follow directions. Nasal steroid sprays  help decrease inflammation in your nose and sinuses. Decongestants  help reduce swelling and drain mucus in the nose and sinuses. They may help you breathe easier. Do not use decongestants for more than 3 days. Antihistamines  help dry mucus in the nose and relieve sneezing. Antibiotics  help treat or prevent a bacterial infection. Self-care:   Rinse your sinuses as directed.   Use a sinus rinse device to rinse your nasal passages with a saline (salt water) solution or distilled water. Do not  use tap water. A sinus rinse will help thin the mucus in your nose and rinse away pollen and dirt. It will also help lower swelling so you can breathe normally. Use a humidifier  to increase air moisture in your home. Moist air may make it easier for you to breathe and help decrease your cough. Sleep with your head raised. Place an extra pillow under your head before you go to sleep to help your sinuses drain. Drink liquids as directed. Ask your healthcare provider how much liquid to drink each day and which liquids are best for you. Liquids will thin the mucus in your nose and help it drain. Do not have drinks that contain alcohol or caffeine. Do not smoke, and avoid secondhand smoke. Nicotine and other chemicals in cigarettes and cigars can make your symptoms worse. Ask your healthcare provider for information if you currently smoke and need help to quit. E-cigarettes or smokeless tobacco still contain nicotine. Talk to your healthcare provider before you use these products. Prevent the spread of germs:   Wash your hands often with soap and water. Wash your hands after you use the bathroom, change a child's diaper, or sneeze. Wash your hands before you prepare or eat food. Stay away from people who are sick. Some germs spread easily and quickly through contact. Follow up with your doctor as directed: You may be referred to an ear, nose, and throat specialist. Write down your questions so you remember to ask them during your visits. © Copyright Nestor Davenport 2023 Information is for End User's use only and may not be sold, redistributed or otherwise used for commercial purposes. The above information is an  only. It is not intended as medical advice for individual conditions or treatments.  Talk to your doctor, nurse or pharmacist before following any medical regimen to see if it is safe and effective for you.

## 2023-11-26 NOTE — PATIENT INSTRUCTIONS
Rhinosinusitis   WHAT YOU NEED TO KNOW:   Rhinosinusitis (RS) is inflammation or infection of your nasal passages and sinuses. RS is most often caused by a virus but may be caused by bacteria. Acute RS lasts up to 12 weeks. Chronic RS lasts more than 12 weeks. Recurrent RS means you have 4 or more infections in 1 year. DISCHARGE INSTRUCTIONS:   Return to the emergency department if:   You have trouble breathing, or wheezing that is getting worse. You have a stiff neck, a fever, or a bad headache. You cannot open your eye. Your eyeball bulges out, or you cannot move your eye. You are more sleepy than usual, or you notice changes in your ability to think, move, or talk. You have swelling of your forehead or scalp. Call your doctor if:   You have vision changes, such as double vision. Your eye and eyelid are red, swollen, and painful. Your symptoms do not improve after 10 days. You have nausea and are vomiting. Your nose is bleeding. You have questions or concerns about your condition or care. Medicines: Your symptoms may go away on their own. Your healthcare provider may recommend watchful waiting for up to 10 days before starting antibiotics. Antibiotics will not help if the infection is caused by a virus. Check with your provider before you take any over-the-counter medicines. You may need any of the following:  Acetaminophen  decreases pain and fever. It is available without a doctor's order. Ask how much to take and how often to take it. Follow directions. Read the labels of all other medicines you are using to see if they also contain acetaminophen, or ask your doctor or pharmacist. Acetaminophen can cause liver damage if not taken correctly. NSAIDs , such as ibuprofen, help decrease swelling, pain, and fever. This medicine is available with or without a doctor's order. NSAIDs can cause stomach bleeding or kidney problems in certain people.  If you take blood thinner medicine, always ask your healthcare provider if NSAIDs are safe for you. Always read the medicine label and follow directions. Nasal steroid sprays  help decrease inflammation in your nose and sinuses. Decongestants  help reduce swelling and drain mucus in the nose and sinuses. They may help you breathe easier. Do not use decongestants for more than 3 days. Antihistamines  help dry mucus in the nose and relieve sneezing. Antibiotics  help treat or prevent a bacterial infection. Self-care:   Rinse your sinuses as directed. Use a sinus rinse device to rinse your nasal passages with a saline (salt water) solution or distilled water. Do not  use tap water. A sinus rinse will help thin the mucus in your nose and rinse away pollen and dirt. It will also help lower swelling so you can breathe normally. Use a humidifier  to increase air moisture in your home. Moist air may make it easier for you to breathe and help decrease your cough. Sleep with your head raised. Place an extra pillow under your head before you go to sleep to help your sinuses drain. Drink liquids as directed. Ask your healthcare provider how much liquid to drink each day and which liquids are best for you. Liquids will thin the mucus in your nose and help it drain. Do not have drinks that contain alcohol or caffeine. Do not smoke, and avoid secondhand smoke. Nicotine and other chemicals in cigarettes and cigars can make your symptoms worse. Ask your healthcare provider for information if you currently smoke and need help to quit. E-cigarettes or smokeless tobacco still contain nicotine. Talk to your healthcare provider before you use these products. Prevent the spread of germs:   Wash your hands often with soap and water. Wash your hands after you use the bathroom, change a child's diaper, or sneeze. Wash your hands before you prepare or eat food. Stay away from people who are sick.   Some germs spread easily and quickly through contact. Follow up with your doctor as directed: You may be referred to an ear, nose, and throat specialist. Write down your questions so you remember to ask them during your visits. © Copyright Nola Guardado 2023 Information is for End User's use only and may not be sold, redistributed or otherwise used for commercial purposes. The above information is an  only. It is not intended as medical advice for individual conditions or treatments. Talk to your doctor, nurse or pharmacist before following any medical regimen to see if it is safe and effective for you.

## 2023-11-26 NOTE — CARE ANYWHERE EVISITS
Visit Summary for Norma GOMEZ - Gender: Female - Date of Birth: [id number]  Date: [id number] - Duration: 12 minutes  Patient: Norma GOMEZ  Provider: Rachel Cuevas PA-C    Patient Contact Information  Address  [address] [place] [address]; [address] [place], [place] [id number]    Visit Topics  Sinus infection  [Added By: Self - 2023-11-26]    Triage Questions   What is your current physical address in the event of a medical emergency? Answer []  Are you allergic to any medications? Answer []  Are you now or could you be pregnant? Answer []  Do you have any immune system compromise or chronic lung   disease? Answer []  Do you have any vulnerable family members in the home (infant, pregnant, cancer, elderly)? Answer []     Conversation Transcripts  [0A][0A] [Notification] You are connected with Rachel Cuevas PA-C, Urgent Care Specialist.[0A][Notification] Belkis Dejesus is located in [place]. [0A][Notification] Belkis Dejesus has shared health history. Bhavin Hayes .[0A]    Diagnosis  Acute recurrent maxillary sinusitis    Procedures  Value: [id number] Code: CPT-4 UNLISTED E&M SERVICE    Medications Prescribed    No prescriptions ordered    Electronically signed by: Joaquin Mcintosh(NPI [id number])

## 2023-11-29 ENCOUNTER — OFFICE VISIT (OUTPATIENT)
Dept: FAMILY MEDICINE CLINIC | Facility: CLINIC | Age: 63
End: 2023-11-29
Payer: COMMERCIAL

## 2023-11-29 ENCOUNTER — TELEPHONE (OUTPATIENT)
Dept: FAMILY MEDICINE CLINIC | Facility: CLINIC | Age: 63
End: 2023-11-29

## 2023-11-29 VITALS
HEIGHT: 60 IN | OXYGEN SATURATION: 97 % | SYSTOLIC BLOOD PRESSURE: 122 MMHG | BODY MASS INDEX: 35.5 KG/M2 | TEMPERATURE: 97.9 F | WEIGHT: 180.8 LBS | HEART RATE: 102 BPM | DIASTOLIC BLOOD PRESSURE: 84 MMHG

## 2023-11-29 DIAGNOSIS — J01.01 ACUTE RECURRENT MAXILLARY SINUSITIS: Primary | ICD-10-CM

## 2023-11-29 PROCEDURE — 99213 OFFICE O/P EST LOW 20 MIN: CPT | Performed by: FAMILY MEDICINE

## 2023-11-29 NOTE — PROGRESS NOTES
Assessment/Plan:         Problem List Items Addressed This Visit        Respiratory    Acute recurrent maxillary sinusitis - Primary     Advised to complete Augmentin course, use OTC cough and cold meds as needed, continue Flonase  Call if not improved after Augmentin course. Subjective:      Patient ID: Channing Campos is a 66 y.o. female. 66year old here states she has sinus symptoms. Was seen on virtual visit on 23 and was prescribed Augmentin. She is on day 4 of this and was prescirbed a 7 day course. She complains sore throat, pressure in ears, sinus congestion and pain, cough. Sinus Problem  Associated symptoms include congestion, coughing, headaches and sinus pressure. Pertinent negatives include no chills or shortness of breath. The following portions of the patient's history were reviewed and updated as appropriate:   Past Medical History:  She has a past medical history of Seasonal allergies, Sinus congestion, Skin lesion of left lower extremity (2019), and Torn medial meniscus. ,  _______________________________________________________________________  Medical Problems:  does not have any pertinent problems on file.,  _______________________________________________________________________  Past Surgical History:   has a past surgical history that includes Knee surgery; Replacement total knee; Knee arthroscopy, medial patello femoral ligament repair;  section; Colonoscopy; and pr arthrp kne condyle&platu medial&lat compartments (Right, 2020). ,  _______________________________________________________________________  Family History:  family history includes Colon cancer (age of onset: 80) in her maternal grandfather; Hypertension in her mother; Lymphoma (age of onset: 66) in her mother; No Known Problems in her daughter, daughter, father, maternal grandmother, paternal aunt, and paternal grandmother; Pancreatic cancer (age of onset: 76) in her maternal aunt.,  _______________________________________________________________________  Social History:   reports that she has never smoked. She has never used smokeless tobacco. She reports current alcohol use. She reports that she does not use drugs. ,  _______________________________________________________________________  Allergies:  has No Known Allergies. .  _______________________________________________________________________  Current Outpatient Medications   Medication Sig Dispense Refill   • amoxicillin-clavulanate (AUGMENTIN) 875-125 mg per tablet Take 1 tablet by mouth every 12 (twelve) hours for 7 days 14 tablet 0   • fluticasone (FLONASE) 50 mcg/act nasal spray 1 spray into each nostril daily     • hydrochlorothiazide (HYDRODIURIL) 25 mg tablet Take 1 tablet (25 mg total) by mouth daily 90 tablet 3   • loratadine (CLARITIN) 10 mg tablet Take 10 mg by mouth daily       Current Facility-Administered Medications   Medication Dose Route Frequency Provider Last Rate Last Admin   • cyanocobalamin injection 1,000 mcg  1,000 mcg Intramuscular Q30 Days Roxanne Gatica MD   1,000 mcg at 10/11/23 1103     _______________________________________________________________________  Review of Systems   Constitutional:  Positive for fatigue. Negative for chills and fever. HENT:  Positive for congestion, sinus pressure and sinus pain. Respiratory:  Positive for cough. Negative for shortness of breath and wheezing. Musculoskeletal:  Negative for myalgias. Neurological:  Positive for headaches. Objective:  Vitals:    11/29/23 1459   BP: 122/84   BP Location: Left arm   Patient Position: Sitting   Pulse: 102   Temp: 97.9 °F (36.6 °C)   TempSrc: Temporal   SpO2: 97%   Weight: 82 kg (180 lb 12.8 oz)   Height: 4' 11.5" (1.511 m)     Body mass index is 35.91 kg/m². Physical Exam  Vitals and nursing note reviewed. Constitutional:       General: She is not in acute distress.      Appearance: She is well-developed. HENT:      Right Ear: Hearing, tympanic membrane, ear canal and external ear normal.      Left Ear: Hearing, tympanic membrane, ear canal and external ear normal.      Nose:      Right Sinus: Maxillary sinus tenderness and frontal sinus tenderness present. Left Sinus: Maxillary sinus tenderness and frontal sinus tenderness present. Mouth/Throat:      Mouth: Mucous membranes are moist.      Pharynx: Oropharynx is clear. Uvula midline. Posterior oropharyngeal erythema present. No oropharyngeal exudate. Eyes:      Conjunctiva/sclera: Conjunctivae normal.      Pupils: Pupils are equal, round, and reactive to light. Cardiovascular:      Rate and Rhythm: Normal rate. Heart sounds: Normal heart sounds. No murmur heard. No friction rub. No gallop. Pulmonary:      Effort: Pulmonary effort is normal. No respiratory distress. Breath sounds: Normal breath sounds. No wheezing or rales. Lymphadenopathy:      Cervical: No cervical adenopathy. Skin:     General: Skin is warm. Findings: No rash. Neurological:      Mental Status: She is alert and oriented to person, place, and time. Psychiatric:         Behavior: Behavior normal.         Thought Content:  Thought content normal.         Judgment: Judgment normal.

## 2023-11-29 NOTE — TELEPHONE ENCOUNTER
Patient had a virtual visit and rx an ABX for a sinus infection - not any better. Offered an appt tomorrow but today is her only day off.  Can we squeeze her in?

## 2023-11-29 NOTE — ASSESSMENT & PLAN NOTE
Advised to complete Augmentin course, use OTC cough and cold meds as needed, continue Flonase  Call if not improved after Augmentin course.

## 2023-12-04 DIAGNOSIS — J01.01 ACUTE RECURRENT MAXILLARY SINUSITIS: Primary | ICD-10-CM

## 2023-12-04 RX ORDER — DOXYCYCLINE HYCLATE 100 MG/1
100 CAPSULE ORAL EVERY 12 HOURS SCHEDULED
Qty: 14 CAPSULE | Refills: 0 | Status: SHIPPED | OUTPATIENT
Start: 2023-12-04 | End: 2023-12-11

## 2023-12-12 DIAGNOSIS — J34.89 SINUS PRESSURE: Primary | ICD-10-CM

## 2024-01-25 PROBLEM — J01.01 ACUTE RECURRENT MAXILLARY SINUSITIS: Status: RESOLVED | Noted: 2021-04-27 | Resolved: 2024-01-25

## 2024-02-02 DIAGNOSIS — I10 PRIMARY HYPERTENSION: ICD-10-CM

## 2024-02-02 RX ORDER — HYDROCHLOROTHIAZIDE 25 MG/1
25 TABLET ORAL DAILY
Qty: 90 TABLET | Refills: 3 | Status: SHIPPED | OUTPATIENT
Start: 2024-02-02

## 2024-02-28 DIAGNOSIS — F41.8 SITUATIONAL ANXIETY: ICD-10-CM

## 2024-04-09 ENCOUNTER — VBI (OUTPATIENT)
Dept: ADMINISTRATIVE | Facility: OTHER | Age: 64
End: 2024-04-09

## 2024-05-08 ENCOUNTER — VBI (OUTPATIENT)
Dept: ADMINISTRATIVE | Facility: OTHER | Age: 64
End: 2024-05-08

## 2024-06-17 ENCOUNTER — TELEPHONE (OUTPATIENT)
Dept: FAMILY MEDICINE CLINIC | Facility: CLINIC | Age: 64
End: 2024-06-17

## 2024-06-21 NOTE — TELEPHONE ENCOUNTER
PA for WEGOVY 0.25 MG/0.5ML EXCLUDED from plan       Reason:(Screenshot if applicable)        Message sent to office clinical pool Yes

## 2024-06-21 NOTE — TELEPHONE ENCOUNTER
PA for WEGOVY 0.25 MG/0.5ML     Submitted via    [x]CMM-KEY [id number]  []Surescripts-Case ID #   []Faxed to plan   []Other website   []Phone call Case ID #     Office notes sent, clinical questions answered. Awaiting determination    Turnaround time for your insurance to make a decision on your Prior Authorization can take 7-21 business days.

## 2024-06-24 ENCOUNTER — TELEPHONE (OUTPATIENT)
Age: 64
End: 2024-06-24

## 2024-06-24 NOTE — TELEPHONE ENCOUNTER
Please clarify - is she getting B12 injections here? Has not had B12 level checked in a lot time.    Is she even on Wegovy yet?  I'm not sure what she means by being off schedule.

## 2024-06-24 NOTE — TELEPHONE ENCOUNTER
Patient called in to make two appointments-one being her VB12 injection and the other being her 4 week follow up from appt for [facility] and refill is due.  I did not see an order for the VB12 nor are there any appointments avail per solutions at the week follow up time.    She is concerned about getting off of schedule for the Wegovy.  Is it possible if an appointment can't be found at 4 weeks that she is able to get a refill then and make first avail if whenever that is?     Please contact patient about both appointments [place] VB12 needs order placed unless I missed it and 4 week follow up.

## 2024-06-25 DIAGNOSIS — E53.8 B12 DEFICIENCY: Primary | ICD-10-CM

## 2024-06-25 NOTE — TELEPHONE ENCOUNTER
B12 ordered, additional labs were previously ordered    Please set up the Wegovy Rx if she needs this sent in

## 2024-06-25 NOTE — TELEPHONE ENCOUNTER
Just started the Wegovy last Saturday - she is paying cash . She is willing to go for any blood work that she is due for. Will be scheduling an appt with you and advised to call office back when needing the next dosage of wegovy.

## 2024-07-02 ENCOUNTER — APPOINTMENT (OUTPATIENT)
Dept: LAB | Facility: CLINIC | Age: 64
End: 2024-07-02
Payer: COMMERCIAL

## 2024-07-02 DIAGNOSIS — Z13.1 SCREENING FOR DIABETES MELLITUS: ICD-10-CM

## 2024-07-02 DIAGNOSIS — E53.8 B12 DEFICIENCY: ICD-10-CM

## 2024-07-02 DIAGNOSIS — I10 PRIMARY HYPERTENSION: ICD-10-CM

## 2024-07-02 DIAGNOSIS — R63.5 WEIGHT GAIN: ICD-10-CM

## 2024-07-02 DIAGNOSIS — Z00.00 ANNUAL PHYSICAL EXAM: ICD-10-CM

## 2024-07-02 DIAGNOSIS — Z13.220 LIPID SCREENING: ICD-10-CM

## 2024-07-02 LAB
EST. AVERAGE GLUCOSE BLD GHB EST-MCNC: 117 MG/DL
HBA1C MFR BLD: 5.7 %
TSH SERPL DL<=0.05 MIU/L-ACNC: 2.71 UIU/ML (ref 0.45–4.5)
VIT B12 SERPL-MCNC: 204 PG/ML (ref 180–914)

## 2024-07-02 PROCEDURE — 80061 LIPID PANEL: CPT

## 2024-07-02 PROCEDURE — 84443 ASSAY THYROID STIM HORMONE: CPT

## 2024-07-02 PROCEDURE — 83036 HEMOGLOBIN GLYCOSYLATED A1C: CPT

## 2024-07-02 PROCEDURE — 82607 VITAMIN B-12: CPT

## 2024-07-02 PROCEDURE — 36415 COLL VENOUS BLD VENIPUNCTURE: CPT

## 2024-07-02 PROCEDURE — 80053 COMPREHEN METABOLIC PANEL: CPT

## 2024-07-03 LAB
ALBUMIN SERPL BCG-MCNC: 4.2 G/DL (ref 3.5–5)
ALP SERPL-CCNC: 81 U/L (ref 34–104)
ALT SERPL W P-5'-P-CCNC: 12 U/L (ref 7–52)
ANION GAP SERPL CALCULATED.3IONS-SCNC: 12 MMOL/L (ref 4–13)
AST SERPL W P-5'-P-CCNC: 18 U/L (ref 13–39)
BILIRUB SERPL-MCNC: 0.42 MG/DL (ref 0.2–1)
BUN SERPL-MCNC: 14 MG/DL (ref 5–25)
CALCIUM SERPL-MCNC: 10.2 MG/DL (ref 8.4–10.2)
CHLORIDE SERPL-SCNC: 101 MMOL/L (ref 96–108)
CHOLEST SERPL-MCNC: 204 MG/DL
CO2 SERPL-SCNC: 26 MMOL/L (ref 21–32)
CREAT SERPL-MCNC: 0.86 MG/DL (ref 0.6–1.3)
GFR SERPL CREATININE-BSD FRML MDRD: 72 ML/MIN/1.73SQ M
GLUCOSE P FAST SERPL-MCNC: 76 MG/DL (ref 65–99)
HDLC SERPL-MCNC: 73 MG/DL
LDLC SERPL CALC-MCNC: 113 MG/DL (ref 0–100)
POTASSIUM SERPL-SCNC: 4.3 MMOL/L (ref 3.5–5.3)
PROT SERPL-MCNC: 7.2 G/DL (ref 6.4–8.4)
SODIUM SERPL-SCNC: 139 MMOL/L (ref 135–147)
TRIGL SERPL-MCNC: 92 MG/DL

## 2024-07-09 ENCOUNTER — TELEPHONE (OUTPATIENT)
Age: 64
End: 2024-07-09

## 2024-07-09 NOTE — TELEPHONE ENCOUNTER
Patient is on vacation and got water in her ears and said has an ear infection and wanted an antibiotic sent to  [facility] [address] [phone]    Also wants to know if there is any over the counter drops she can use too    Please advise patient

## 2024-07-12 ENCOUNTER — TELEPHONE (OUTPATIENT)
Age: 64
End: 2024-07-12

## 2024-07-12 NOTE — TELEPHONE ENCOUNTER
Patient called to say she will be taking her last injection of Wegovy tomorrow, 7/13/24, and is ready for the next prescription.    Patient calling early as sometimes medication is hard to locate.

## 2024-07-15 ENCOUNTER — TELEPHONE (OUTPATIENT)
Dept: FAMILY MEDICINE CLINIC | Facility: CLINIC | Age: 64
End: 2024-07-15

## 2024-07-29 NOTE — PROGRESS NOTES
Problem: Knowledge Deficit  Goal: Patient/family/caregiver demonstrates understanding of disease process, treatment plan, medications, and discharge instructions  Description: Complete learning assessment and assess knowledge base.  Interventions:  - Provide teaching at level of understanding  - Provide teaching via preferred learning methods  Outcome: Progressing      Assessment/Plan:         Problem List Items Addressed This Visit        Other    Elevated blood pressure reading - Primary    Situational anxiety    Relevant Medications    ALPRAZolam (XANAX) 0 25 mg tablet          -Her BP device is not accurate - she will get a new one  We discussed possible reasons why her blood pressure may be elevated  She is currently taking NSAIDs which could contribute, she is also anxious   -She asked for prescription for Xanax to be used PRN for anxiety  We discussed this is not to be used daily  Discussed risks and that this can be habit forming  PDMP verified and compliant  Subjective:      Patient ID: Xavier Menendez is a 64 y o  female  64year old here for elevated BP  She had her knee replaced last month and has been doing PT  Her physical therapist contacted me regarding her BP running high  She says it was 161/111 at home  She had a couple episodes of dizziness at home, the dizziness passed when she sat down  Today at PT her BP was 146/97  Readings have been 158/108, 153/110, 185/123  She is on aleve BID, Aspirin BID  She does admit to anxiety during   Today my reading is 144/92  Her BP device reads 166/106  Her device is about 8years old  She had high BP in the past when she was stressed out and she took Xanax and it seemed to help  She asks to try this again  Feeling stressed out about her recent surgery and doing PT  The following portions of the patient's history were reviewed and updated as appropriate:   She  has a past medical history of Seasonal allergies, Sinus congestion, Skin lesion of left lower extremity (8/22/2019), and Torn medial meniscus    She   Patient Active Problem List    Diagnosis Date Noted    Elevated blood pressure reading 02/19/2020    Situational anxiety 02/19/2020    Nausea 01/31/2020    Gastroesophageal reflux disease 01/31/2020    S/P TKR (total knee replacement) using cement, right 01/20/2020    Primary osteoarthritis of right knee 2019    Old tear of medial meniscus of right knee 2019    Osteoporosis without current pathological fracture 2019    Vaginal atrophy 04/15/2019    Stress incontinence of urine 04/15/2019    Iron deficiency 10/26/2016    Vitamin B12 deficiency 10/26/2016     She  has a past surgical history that includes Knee surgery; Replacement total knee; Knee arthroscopy, medial patello femoral ligament repair;  section; Colonoscopy; and pr total knee arthroplasty (Right, 2020)  Her family history includes Colon cancer (age of onset: [de-identified]) in her maternal grandfather; Hypertension in her mother; No Known Problems in her daughter, daughter, father, maternal grandmother, paternal aunt, and paternal grandmother; Pancreatic cancer (age of onset: 76) in her maternal aunt  She  reports that she has never smoked  She has never used smokeless tobacco  She reports that she drinks alcohol  She reports that she does not use drugs    Current Outpatient Medications   Medication Sig Dispense Refill    acetaminophen (TYLENOL) 325 mg tablet Take 3 tablets (975 mg total) by mouth every 8 (eight) hours 30 tablet 0    alendronate (FOSAMAX) 70 mg tablet Take 1 tablet (70 mg total) by mouth every 7 days 4 tablet 5    aspirin 325 mg tablet Take 1 tablet (325 mg total) by mouth 2 (two) times a day 84 tablet 0    estradiol (ESTRACE) 0 1 mg/g vaginal cream Insert 1 g into the vagina 3 (three) times a week 42 5 g 3    fluticasone (FLONASE) 50 mcg/act nasal spray 1 spray into each nostril 2 (two) times a day      omeprazole (PriLOSEC) 20 mg delayed release capsule Take 20 mg by mouth daily      ALPRAZolam (XANAX) 0 25 mg tablet Take 1 tablet (0 25 mg total) by mouth daily as needed for anxiety 30 tablet 0    celecoxib (CeleBREX) 100 mg capsule Take 1 capsule (100 mg total) by mouth 2 (two) times a day (Patient not taking: Reported on 2020) 28 capsule 0    docusate sodium (COLACE) 100 mg capsule Take 1 capsule (100 mg total) by mouth 2 (two) times a day (Patient not taking: Reported on 2/19/2020) 60 capsule 0    ondansetron (ZOFRAN) 4 mg tablet Take 1 tablet (4 mg total) by mouth every 8 (eight) hours as needed for nausea or vomiting (Patient not taking: Reported on 2/11/2020) 20 tablet 0    traMADol (ULTRAM) 50 mg tablet 1-2 tabs every six hours as needed for pain (Patient not taking: Reported on 2/11/2020) 48 tablet 0     No current facility-administered medications for this visit  Current Outpatient Medications on File Prior to Visit   Medication Sig    acetaminophen (TYLENOL) 325 mg tablet Take 3 tablets (975 mg total) by mouth every 8 (eight) hours    alendronate (FOSAMAX) 70 mg tablet Take 1 tablet (70 mg total) by mouth every 7 days    aspirin 325 mg tablet Take 1 tablet (325 mg total) by mouth 2 (two) times a day    estradiol (ESTRACE) 0 1 mg/g vaginal cream Insert 1 g into the vagina 3 (three) times a week    fluticasone (FLONASE) 50 mcg/act nasal spray 1 spray into each nostril 2 (two) times a day    omeprazole (PriLOSEC) 20 mg delayed release capsule Take 20 mg by mouth daily    celecoxib (CeleBREX) 100 mg capsule Take 1 capsule (100 mg total) by mouth 2 (two) times a day (Patient not taking: Reported on 2/11/2020)    docusate sodium (COLACE) 100 mg capsule Take 1 capsule (100 mg total) by mouth 2 (two) times a day (Patient not taking: Reported on 2/19/2020)    ondansetron (ZOFRAN) 4 mg tablet Take 1 tablet (4 mg total) by mouth every 8 (eight) hours as needed for nausea or vomiting (Patient not taking: Reported on 2/11/2020)    traMADol (ULTRAM) 50 mg tablet 1-2 tabs every six hours as needed for pain (Patient not taking: Reported on 2/11/2020)     No current facility-administered medications on file prior to visit  She has No Known Allergies       Review of Systems   Constitutional: Negative for activity change, appetite change, chills and fever     Respiratory: Negative for chest tightness and shortness of breath  Cardiovascular: Negative for chest pain and leg swelling  Neurological: Negative for headaches  Psychiatric/Behavioral: Negative for dysphoric mood  The patient is nervous/anxious  Objective:      /88 (BP Location: Left arm, Patient Position: Sitting, Cuff Size: Adult)   Pulse 78   Temp 98 8 °F (37 1 °C) (Tympanic)   Resp 18   Ht 4' 11" (1 499 m)   Wt 75 9 kg (167 lb 6 4 oz)   LMP 09/01/2010 (Approximate)   SpO2 99%   BMI 33 81 kg/m²          Physical Exam   Constitutional: She is oriented to person, place, and time  She appears well-developed and well-nourished  No distress  HENT:   Head: Normocephalic and atraumatic  Eyes: Pupils are equal, round, and reactive to light  Conjunctivae are normal    Neck: Neck supple  Cardiovascular: Normal rate, regular rhythm and normal heart sounds  Exam reveals no gallop and no friction rub  No murmur heard  Pulmonary/Chest: Effort normal and breath sounds normal  No respiratory distress  She has no wheezes  She has no rales  She exhibits no tenderness  Musculoskeletal: Normal range of motion  She exhibits no edema  Neurological: She is alert and oriented to person, place, and time  Skin: Skin is warm and dry  No rash noted  She is not diaphoretic  Psychiatric: She has a normal mood and affect  Her behavior is normal  Judgment and thought content normal    Nursing note and vitals reviewed

## 2024-08-09 ENCOUNTER — RA CDI HCC (OUTPATIENT)
Dept: OTHER | Facility: HOSPITAL | Age: 64
End: 2024-08-09

## 2024-08-12 ENCOUNTER — TELEPHONE (OUTPATIENT)
Dept: FAMILY MEDICINE CLINIC | Facility: CLINIC | Age: 64
End: 2024-08-12

## 2024-08-12 NOTE — TELEPHONE ENCOUNTER
Patient is ready for the next dose on the Wegovy and will be getting it from [facility].    Must specify that it is okay to compound

## 2024-08-13 NOTE — TELEPHONE ENCOUNTER
Patient called stating she called [facility] Wegovy.     The reason the patient needs it compounded is because her insurance does not cover it and to pay out of pocket is $800. Through Compounding the cost is $200.     Please advise patient

## 2024-08-13 NOTE — TELEPHONE ENCOUNTER
We do not prescribe compounded Wegovy.  Im not sure how she has been getting it so far but will have to look at alternatives

## 2024-08-16 ENCOUNTER — OFFICE VISIT (OUTPATIENT)
Dept: FAMILY MEDICINE CLINIC | Facility: CLINIC | Age: 64
End: 2024-08-16
Payer: COMMERCIAL

## 2024-08-16 VITALS
WEIGHT: 179.2 LBS | HEIGHT: 60 IN | BODY MASS INDEX: 35.18 KG/M2 | TEMPERATURE: 97.5 F | HEART RATE: 81 BPM | DIASTOLIC BLOOD PRESSURE: 88 MMHG | SYSTOLIC BLOOD PRESSURE: 122 MMHG | OXYGEN SATURATION: 99 %

## 2024-08-16 DIAGNOSIS — E61.1 IRON DEFICIENCY: ICD-10-CM

## 2024-08-16 DIAGNOSIS — Z12.31 SCREENING MAMMOGRAM FOR BREAST CANCER: ICD-10-CM

## 2024-08-16 DIAGNOSIS — R73.9 HYPERGLYCEMIA: ICD-10-CM

## 2024-08-16 DIAGNOSIS — E53.8 VITAMIN B12 DEFICIENCY: Primary | ICD-10-CM

## 2024-08-16 DIAGNOSIS — E78.2 MIXED HYPERLIPIDEMIA: ICD-10-CM

## 2024-08-16 DIAGNOSIS — Z00.00 ANNUAL PHYSICAL EXAM: ICD-10-CM

## 2024-08-16 PROCEDURE — 96372 THER/PROPH/DIAG INJ SC/IM: CPT | Performed by: FAMILY MEDICINE

## 2024-08-16 PROCEDURE — 99396 PREV VISIT EST AGE 40-64: CPT | Performed by: FAMILY MEDICINE

## 2024-08-16 PROCEDURE — 99214 OFFICE O/P EST MOD 30 MIN: CPT | Performed by: FAMILY MEDICINE

## 2024-08-16 RX ORDER — CYANOCOBALAMIN 1000 UG/ML
1000 INJECTION, SOLUTION INTRAMUSCULAR; SUBCUTANEOUS
Status: SHIPPED | OUTPATIENT
Start: 2024-08-16

## 2024-08-16 RX ADMIN — CYANOCOBALAMIN 1000 MCG: 1000 INJECTION, SOLUTION INTRAMUSCULAR; SUBCUTANEOUS at 13:18

## 2024-08-16 NOTE — PROGRESS NOTES
Adult Annual Physical  Name: Mellisa Rosario      : 1960      MRN: [id number]  Encounter Provider: Carline Torres MD  Encounter Date: 2024   Encounter department: [facility] [address]    Assessment & Plan   1. Vitamin B12 deficiency  Assessment & Plan:  B12 injection today  Continue monthly B12  Orders:  -     Vitamin B12; Future; Expected date: 2025  -     cyanocobalamin injection 1,000 mcg  2. Mixed hyperlipidemia  Assessment & Plan:  Low fat diet   Orders:  -     Lipid Panel with Direct LDL reflex; Future; Expected date: 2025  -     Comprehensive metabolic panel; Future; Expected date: 2025  3. Hyperglycemia  Assessment & Plan:  Low carb diet  Is on Wegovy  Check A1c in 6 months  Orders:  -     Hemoglobin A1C; Future; Expected date: 2025  4. Annual physical exam  5. Screening mammogram for breast cancer  -     Mammo diagnostic bilateral w 3d & cad; Future  6. Iron deficiency  Assessment & Plan:  Last CBC normal  Check levels in 6 months   Orders:  -     CBC and differential; Future; Expected date: 2025  -     Iron Panel (Includes Ferritin, Iron Sat%, Iron, and TIBC); Future; Expected date: 2025    Immunizations and preventive care screenings were discussed with patient today. Appropriate education was printed on patient's after visit summary.    Counseling:  Dental Health: discussed importance of regular tooth brushing, flossing, and dental visits.  Exercise: the importance of regular exercise/physical activity was discussed. Recommend exercise 3-5 times per week for at least 30 minutes.       Depression Screening and Follow-up Plan: Patient was screened for depression during today's encounter. They screened negative with a PHQ-2 score of 0.        History of Present Illness     Adult Annual Physical:  Patient presents for annual physical. She reports she is off her Zoloft. She is no longer at her stressful job. She was stress eating and gained a  "lot of weight. She has been on Wegovy just finished the 2nd month.  She has lost 7-8 lbs. She is walking for exercise.     Had labs done -A1c 5.7%, cholesterol little high but improved.   B12 level was low. She used to get monthly B12 but not recently..     Diet and Physical Activity:  - Diet/Nutrition: limited junk food and consuming 3-5 servings of fruits/vegetables daily.  - Exercise: walking.    Depression Screening:  - PHQ-2 Score: 0    General Health:  - Sleep: sleeps poorly. dog was sick and waking up.  - Hearing: normal hearing bilateral ears.  - Vision: goes for regular eye exams.  - Dental: regular dental visits.    /GYN Health:  - Follows with GYN: yes.   - Menopause: postmenopausal.   - Contraception: menopause.      Review of Systems   Constitutional:  Negative for activity change.   Respiratory:  Negative for chest tightness and shortness of breath.    Cardiovascular:  Negative for chest pain and leg swelling.   Neurological:  Negative for headaches.   Psychiatric/Behavioral:  Negative for dysphoric mood. The patient is not nervous/anxious.          Objective     /88   Pulse 81   Temp 97.5 °F (36.4 °C)   Ht 4' 11.5\" (1.511 m)   Wt 81.3 kg (179 lb 3.2 oz)   LMP 09/01/2010 (Approximate)   SpO2 99%   BMI 35.59 kg/m²     Physical Exam  Vitals and nursing note reviewed.   Constitutional:       General: She is not in acute distress.     Appearance: She is well-developed. She is obese. She is not diaphoretic.   HENT:      Head: Normocephalic and atraumatic.      Right Ear: Tympanic membrane, ear canal and external ear normal.      Left Ear: Tympanic membrane, ear canal and external ear normal.   Eyes:      Conjunctiva/sclera: Conjunctivae normal.   Cardiovascular:      Rate and Rhythm: Normal rate and regular rhythm.      Heart sounds: Normal heart sounds. No murmur heard.     No friction rub. No gallop.   Pulmonary:      Effort: Pulmonary effort is normal. No respiratory distress.      Breath " sounds: Normal breath sounds. No stridor. No wheezing or rales.   Chest:      Chest wall: No tenderness.   Musculoskeletal:      Right lower leg: No edema.      Left lower leg: No edema.   Neurological:      General: No focal deficit present.      Mental Status: She is alert.   Psychiatric:         Mood and Affect: Mood normal.         Behavior: Behavior normal.         Thought Content: Thought content normal.         Judgment: Judgment normal.

## 2024-09-04 DIAGNOSIS — F41.8 SITUATIONAL ANXIETY: Primary | ICD-10-CM

## 2024-09-04 DIAGNOSIS — F41.1 GAD (GENERALIZED ANXIETY DISORDER): ICD-10-CM

## 2024-09-04 RX ORDER — BUPROPION HYDROCHLORIDE 150 MG/1
150 TABLET ORAL EVERY MORNING
Qty: 30 TABLET | Refills: 1 | Status: SHIPPED | OUTPATIENT
Start: 2024-09-04 | End: 2025-03-03

## 2024-09-05 ENCOUNTER — TELEPHONE (OUTPATIENT)
Age: 64
End: 2024-09-05

## 2024-09-05 NOTE — TELEPHONE ENCOUNTER
Contacted the patient regarding a routine referral to verify service needs, inform of the current wait list and place pt on proper wait list. Left a voicemail for patient to contact the intake department at [phone].

## 2024-09-06 ENCOUNTER — VBI (OUTPATIENT)
Dept: ADMINISTRATIVE | Facility: OTHER | Age: 64
End: 2024-09-06

## 2024-09-06 NOTE — TELEPHONE ENCOUNTER
09/06/24 8:53 AM     Chart reviewed for Pap Smear (HPV) aka Cervical Cancer Screening ; nothing is submitted to the patient's insurance at this time.     TYRONE MUHAMMAD MA   [facility]

## 2024-09-11 ENCOUNTER — TELEPHONE (OUTPATIENT)
Age: 64
End: 2024-09-11

## 2024-09-11 NOTE — TELEPHONE ENCOUNTER
Writer attempted to contact pt regarding referral for [facility] to verify services needed and schedule an appt. Lvm to call writer back.

## 2024-09-12 ENCOUNTER — TELEPHONE (OUTPATIENT)
Dept: FAMILY MEDICINE CLINIC | Facility: CLINIC | Age: 64
End: 2024-09-12

## 2024-09-12 ENCOUNTER — TELEPHONE (OUTPATIENT)
Age: 64
End: 2024-09-12

## 2024-09-12 NOTE — TELEPHONE ENCOUNTER
Left message to see if patient needed to have the TB test completed on her physical form. If so she will need to have one done.

## 2024-09-13 ENCOUNTER — CLINICAL SUPPORT (OUTPATIENT)
Dept: FAMILY MEDICINE CLINIC | Facility: CLINIC | Age: 64
End: 2024-09-13
Payer: COMMERCIAL

## 2024-09-13 DIAGNOSIS — E53.8 VITAMIN B12 DEFICIENCY: ICD-10-CM

## 2024-09-13 DIAGNOSIS — Z11.1 SCREENING FOR TUBERCULOSIS: Primary | ICD-10-CM

## 2024-09-13 DIAGNOSIS — Z23 NEED FOR COVID-19 VACCINE: ICD-10-CM

## 2024-09-13 PROCEDURE — 96372 THER/PROPH/DIAG INJ SC/IM: CPT

## 2024-09-13 PROCEDURE — 90480 ADMN SARSCOV2 VAC 1/ONLY CMP: CPT

## 2024-09-13 PROCEDURE — 91320 SARSCV2 VAC 30MCG TRS-SUC IM: CPT

## 2024-09-13 PROCEDURE — 86580 TB INTRADERMAL TEST: CPT

## 2024-09-13 RX ADMIN — CYANOCOBALAMIN 1000 MCG: 1000 INJECTION, SOLUTION INTRAMUSCULAR; SUBCUTANEOUS at 10:26

## 2024-09-13 NOTE — TELEPHONE ENCOUNTER
"Patient called regarding referral. Writer verified pt information and pt shared is interested in talk therapy services at [facility]. Patient shared pts daughter encouraged pt to seek services at \"[facility] counseling services as well. Patient shared would like to seek [facility]'s services and depending how soon can be scheduled will contact outside services.     Pt is interested in services and can be contacted at [phone]  "

## 2024-09-13 NOTE — TELEPHONE ENCOUNTER
Writer attempted to contact pt again. At this point, there is no opening available to schedule an appt. Pt has to be place on wait list. Lvm to call writer back encouraging her to leave the best time/day for writer to contact her.

## 2024-09-13 NOTE — TELEPHONE ENCOUNTER
Patient returned call in regards to vm. Writer notified patient at this time the IC that left vm is not available but there is a wait list for therapy and confirmed if patient is okay with being placed on list. Patient did agree to wait list at this time.

## 2024-09-16 ENCOUNTER — CLINICAL SUPPORT (OUTPATIENT)
Dept: FAMILY MEDICINE CLINIC | Facility: CLINIC | Age: 64
End: 2024-09-16

## 2024-09-16 DIAGNOSIS — Z11.1 ENCOUNTER FOR PPD SKIN TEST READING: Primary | ICD-10-CM

## 2024-09-16 LAB
INDURATION: 0 MM
TB SKIN TEST: NEGATIVE

## 2024-09-26 DIAGNOSIS — F41.1 GAD (GENERALIZED ANXIETY DISORDER): ICD-10-CM

## 2024-09-26 RX ORDER — BUPROPION HYDROCHLORIDE 300 MG/1
300 TABLET ORAL EVERY MORNING
Qty: 30 TABLET | Refills: 1 | Status: SHIPPED | OUTPATIENT
Start: 2024-09-26 | End: 2025-03-25

## 2024-10-16 ENCOUNTER — CLINICAL SUPPORT (OUTPATIENT)
Dept: FAMILY MEDICINE CLINIC | Facility: CLINIC | Age: 64
End: 2024-10-16
Payer: COMMERCIAL

## 2024-10-16 DIAGNOSIS — Z23 NEED FOR INFLUENZA VACCINATION: Primary | ICD-10-CM

## 2024-10-16 PROCEDURE — 90673 RIV3 VACCINE NO PRESERV IM: CPT

## 2024-10-16 PROCEDURE — 90471 IMMUNIZATION ADMIN: CPT

## 2024-10-16 RX ADMIN — CYANOCOBALAMIN 1000 MCG: 1000 INJECTION, SOLUTION INTRAMUSCULAR; SUBCUTANEOUS at 09:43

## 2024-11-12 ENCOUNTER — OFFICE VISIT (OUTPATIENT)
Dept: FAMILY MEDICINE CLINIC | Facility: CLINIC | Age: 64
End: 2024-11-12
Payer: COMMERCIAL

## 2024-11-12 VITALS
HEIGHT: 60 IN | WEIGHT: 170 LBS | OXYGEN SATURATION: 98 % | TEMPERATURE: 98.2 F | BODY MASS INDEX: 33.38 KG/M2 | SYSTOLIC BLOOD PRESSURE: 118 MMHG | HEART RATE: 104 BPM | DIASTOLIC BLOOD PRESSURE: 88 MMHG

## 2024-11-12 DIAGNOSIS — J01.90 ACUTE NON-RECURRENT SINUSITIS, UNSPECIFIED LOCATION: Primary | ICD-10-CM

## 2024-11-12 PROCEDURE — 99213 OFFICE O/P EST LOW 20 MIN: CPT

## 2024-11-12 RX ORDER — BROMPHENIRAMINE MALEATE, PSEUDOEPHEDRINE HYDROCHLORIDE, AND DEXTROMETHORPHAN HYDROBROMIDE 2; 30; 10 MG/5ML; MG/5ML; MG/5ML
5 SYRUP ORAL 4 TIMES DAILY PRN
Qty: 200 ML | Refills: 0 | Status: SHIPPED | OUTPATIENT
Start: 2024-11-12 | End: 2024-11-22

## 2024-11-12 NOTE — PROGRESS NOTES
Ambulatory Visit  Name: Mellisa Rosario      : 1960      MRN: [id number]  Encounter Provider: Doron Pizano PA-C  Encounter Date: 2024   Encounter department: [facility]    Assessment & Plan  Acute non-recurrent sinusitis, unspecified location  Sinus congestion, pressure, cough, rhinorrhea x 9 days, worsening   Advil cold/sinus, Flonase, Claritin, with some relief  PMH of sinus infections, also had had low grade fevers over current course  Exam reveals pharyngeal erythema and congestive phonation   Given sx course and non-response to conservative measures, After discussing risks, benefits, & AE of Abx and medication and using shared decision making, will start: Augmentin and bromfed  Follow up:call/return to office if symptoms worsen or fail to improve, if new onset chest pain, difficulty breathing , or shortness of breath report to the ED, and pt is in agreement.  Orders:    amoxicillin-clavulanate (AUGMENTIN) 875-125 mg per tablet; Take 1 tablet by mouth every 12 (twelve) hours for 7 days Take 2x daily for 7 days. Take with food.    brompheniramine-pseudoephedrine-DM 30-2-10 MG/5ML syrup; Take 5 mL by mouth 4 (four) times a day as needed for cough or congestion (Take as needed for cough/congestion symptoms) for up to 10 days May cause drowsiness. Caution with operating machinery.       History of Present Illness     Mellisa Rosario is a 64 y.o. female  presenting for sick visit. Pt complains of sx including dry cough, productive cough, sore throat, rhinorrhea , post nasal drip, sinus pressure, and sinus and nasal congestion. Sx have been present for 9 day(s) and has been getting worse since onset. She also endorses subjective low grade fevers. Pt has attempted to alleviate their current sx with Advil, Claritin, Flonase which has provided some relief. Pertinent negatives at this time include no chest pain, SOB, difficulty breathing, vomiting, or diarrhea .           History  obtained from : patient  Review of Systems   Constitutional:  Negative for chills and fever.   HENT:  Positive for postnasal drip, sinus pressure and sore throat. Negative for ear pain.    Eyes:  Negative for pain and visual disturbance.   Respiratory:  Positive for cough. Negative for shortness of breath.    Cardiovascular:  Negative for chest pain and palpitations.   Gastrointestinal:  Negative for abdominal pain and vomiting.   Genitourinary:  Negative for dysuria and hematuria.   Musculoskeletal:  Negative for arthralgias and back pain.   Skin:  Negative for color change and rash.   Neurological:  Negative for seizures and syncope.   All other systems reviewed and are negative.    Pertinent Medical History     Medical History Reviewed by provider this encounter:  Tobacco  Allergies  Meds  Problems  Med Hx  Surg Hx  Fam Hx       Past Medical History   Past Medical History:   Diagnosis Date    Seasonal allergies     Sinus congestion     developed nasal congestion and cough 19- is going to call PCP today for RX; advised to call surgeon if not better next week.    Skin lesion of left lower extremity 2019    Torn medial meniscus     2016  last assessed     Past Surgical History:   Procedure Laterality Date     SECTION      x2    COLONOSCOPY      KNEE ARTHROSCOPY, MEDIAL PATELLO FEMORAL LIGAMENT REPAIR      patellar closed    KNEE SURGERY      with medial meniscus repair    [place] ARTHRP KNE CONDYLE&PLATU MEDIAL&LAT COMPARTMENTS Right 2020    Procedure: ARTHROPLASTY KNEE TOTAL;  Surgeon: Vance Mejia DO;  Location: [facility];  Service: Orthopedics    REPLACEMENT TOTAL KNEE       Family History   Problem Relation Age of Onset    Hypertension Mother     Lymphoma Mother 78    No Known Problems Father     Colon cancer Maternal Grandfather 80    No Known Problems Daughter     No Known Problems Maternal Grandmother     No Known Problems Paternal Grandmother     Pancreatic cancer Maternal  Aunt 74    No Known Problems Daughter     No Known Problems Paternal Aunt     Breast cancer Neg Hx      Current Outpatient Medications on File Prior to Visit   Medication Sig Dispense Refill    buPROPion (WELLBUTRIN XL) 300 mg 24 hr tablet Take 1 tablet (300 mg total) by mouth every morning 30 tablet 1    fluticasone (FLONASE) 50 mcg/act nasal spray 1 spray into each nostril daily      hydroCHLOROthiazide 25 mg tablet TAKE ONE TABLET BY MOUTH EVERY DAY 90 tablet 3    loratadine (CLARITIN) 10 mg tablet Take 10 mg by mouth daily      Semaglutide-Weight Management (WEGOVY) 1 MG/0.5ML Inject 0.5 mL (1 mg total) under the skin once a week 2 mL 0     Current Facility-Administered Medications on File Prior to Visit   Medication Dose Route Frequency Provider Last Rate Last Admin    cyanocobalamin injection 1,000 mcg  1,000 mcg Intramuscular Q30 Days Carline Torres MD   1,000 mcg at 10/16/24 0943    cyanocobalamin injection 1,000 mcg  1,000 mcg Intramuscular Q30 Days    1,000 mcg at 09/13/24 1026   No Known Allergies   Current Outpatient Medications on File Prior to Visit   Medication Sig Dispense Refill    buPROPion (WELLBUTRIN XL) 300 mg 24 hr tablet Take 1 tablet (300 mg total) by mouth every morning 30 tablet 1    fluticasone (FLONASE) 50 mcg/act nasal spray 1 spray into each nostril daily      hydroCHLOROthiazide 25 mg tablet TAKE ONE TABLET BY MOUTH EVERY DAY 90 tablet 3    loratadine (CLARITIN) 10 mg tablet Take 10 mg by mouth daily      Semaglutide-Weight Management (WEGOVY) 1 MG/0.5ML Inject 0.5 mL (1 mg total) under the skin once a week 2 mL 0     Current Facility-Administered Medications on File Prior to Visit   Medication Dose Route Frequency Provider Last Rate Last Admin    cyanocobalamin injection 1,000 mcg  1,000 mcg Intramuscular Q30 Days Carline Torres MD   1,000 mcg at 10/16/24 0943    cyanocobalamin injection 1,000 mcg  1,000 mcg Intramuscular Q30 Days    1,000 mcg at 09/13/24 1026      Social  "History     Tobacco Use    Smoking status: Never     Passive exposure: Never    Smokeless tobacco: Never   Vaping Use    Vaping status: Never Used   Substance and Sexual Activity    Alcohol use: Yes     Comment: social drinker in all scripts    Drug use: No    Sexual activity: Not on file         Objective     /88 (BP Location: Left arm, Patient Position: Sitting, Cuff Size: Large)   Pulse 104   Temp 98.2 °F (36.8 °C)   Ht 4' 11.5\" (1.511 m)   Wt 77.1 kg (170 lb)   LMP 09/01/2010 (Approximate)   SpO2 98%   BMI 33.76 kg/m²     Physical Exam  Vitals and nursing note reviewed.   Constitutional:       General: She is not in acute distress.     Appearance: She is well-developed.   HENT:      Head: Normocephalic and atraumatic.      Nose: Congestion present.      Mouth/Throat:      Pharynx: Posterior oropharyngeal erythema present.   Eyes:      Conjunctiva/sclera: Conjunctivae normal.   Cardiovascular:      Rate and Rhythm: Normal rate and regular rhythm.      Heart sounds: No murmur heard.  Pulmonary:      Effort: Pulmonary effort is normal. No respiratory distress.      Breath sounds: Normal breath sounds.   Abdominal:      Palpations: Abdomen is soft.      Tenderness: There is no abdominal tenderness.   Musculoskeletal:         General: No swelling.      Cervical back: Neck supple.   Skin:     General: Skin is warm and dry.      Capillary Refill: Capillary refill takes less than 2 seconds.   Neurological:      Mental Status: She is alert.   Psychiatric:         Mood and Affect: Mood normal.         "

## 2024-11-20 DIAGNOSIS — F41.1 GAD (GENERALIZED ANXIETY DISORDER): ICD-10-CM

## 2024-11-21 RX ORDER — BUPROPION HYDROCHLORIDE 300 MG/1
300 TABLET ORAL EVERY MORNING
Qty: 90 TABLET | Refills: 1 | Status: SHIPPED | OUTPATIENT
Start: 2024-11-21

## 2024-12-09 ENCOUNTER — VBI (OUTPATIENT)
Dept: ADMINISTRATIVE | Facility: OTHER | Age: 64
End: 2024-12-09

## 2024-12-09 NOTE — TELEPHONE ENCOUNTER
12/09/24 11:37 AM     Chart reviewed for   Cervical Cancer Screening    ; nothing is submitted to the patient's insurance at this time.     TYRONE MUHAMMAD MA   [facility]

## 2025-01-30 DIAGNOSIS — I10 PRIMARY HYPERTENSION: ICD-10-CM

## 2025-01-30 RX ORDER — HYDROCHLOROTHIAZIDE 25 MG/1
25 TABLET ORAL DAILY
Qty: 90 TABLET | Refills: 1 | Status: SHIPPED | OUTPATIENT
Start: 2025-01-30

## 2025-02-12 ENCOUNTER — RA CDI HCC (OUTPATIENT)
Dept: OTHER | Facility: HOSPITAL | Age: 65
End: 2025-02-12

## 2025-02-12 NOTE — PROGRESS NOTES
HCC coding opportunities       Chart reviewed, no opportunity found: CHART REVIEWED, NO OPPORTUNITY FOUND   This is a reminder to address (resolve/update/assess) ALL HCC (risk adjustment) codes as found on active problem list for 2025 as patient scores reset to zero BRADY.     Patients Insurance        Commercial Insurance: [employer]

## 2025-02-19 ENCOUNTER — OFFICE VISIT (OUTPATIENT)
Dept: FAMILY MEDICINE CLINIC | Facility: CLINIC | Age: 65
End: 2025-02-19
Payer: COMMERCIAL

## 2025-02-19 VITALS
HEART RATE: 88 BPM | BODY MASS INDEX: 32.28 KG/M2 | HEIGHT: 60 IN | OXYGEN SATURATION: 100 % | SYSTOLIC BLOOD PRESSURE: 120 MMHG | TEMPERATURE: 97.8 F | DIASTOLIC BLOOD PRESSURE: 84 MMHG | WEIGHT: 164.4 LBS

## 2025-02-19 DIAGNOSIS — F51.04 PSYCHOPHYSIOLOGICAL INSOMNIA: Primary | ICD-10-CM

## 2025-02-19 DIAGNOSIS — E53.8 VITAMIN B12 DEFICIENCY: ICD-10-CM

## 2025-02-19 PROCEDURE — 96372 THER/PROPH/DIAG INJ SC/IM: CPT | Performed by: FAMILY MEDICINE

## 2025-02-19 PROCEDURE — 99214 OFFICE O/P EST MOD 30 MIN: CPT | Performed by: FAMILY MEDICINE

## 2025-02-19 RX ORDER — TRAZODONE HYDROCHLORIDE 50 MG/1
50 TABLET, FILM COATED ORAL
Qty: 30 TABLET | Refills: 1 | Status: SHIPPED | OUTPATIENT
Start: 2025-02-19

## 2025-02-19 RX ADMIN — CYANOCOBALAMIN 1000 MCG: 1000 INJECTION, SOLUTION INTRAMUSCULAR; SUBCUTANEOUS at 10:28

## 2025-02-19 NOTE — ASSESSMENT & PLAN NOTE
Will start Trazodone. Discussed common side effects.  Sleep hygiene reviewed/info provided.  Orders:  •  traZODone (DESYREL) 50 mg tablet; Take 1 tablet (50 mg total) by mouth daily at bedtime

## 2025-02-19 NOTE — PROGRESS NOTES
"Name: Mellisa Rosario      : 1960      MRN: [id number]  Encounter Provider: Carline Torres MD  Encounter Date: 2025   Encounter department: [facility]  :  Assessment & Plan  Psychophysiological insomnia  Will start Trazodone. Discussed common side effects.  Sleep hygiene reviewed/info provided.  Orders:  •  traZODone (DESYREL) 50 mg tablet; Take 1 tablet (50 mg total) by mouth daily at bedtime    Vitamin B12 deficiency  Continue B12 injections.              History of Present Illness   64 year old here for check up. She gets B12 shots.  She is due today.  She has been feeling down lately. She was on Wellbutrin, but stopped it - says it caused shakes.   She was not able to get Wegovy but has lost 15 lbs on her own, mostly due to lack of appetite due to stress/depression. She did therapy but did not have a good experience so no longer doing it. Lots of stress related to her mom and also loss of job last year. Can't sleep. Sometimes has anxiety at night. She has trouble staying asleep.   Labs are ordered from previous visit.      Review of Systems   Psychiatric/Behavioral:  Positive for dysphoric mood and sleep disturbance. The patient is nervous/anxious.        Objective   /84 (BP Location: Left arm, Patient Position: Sitting)   Pulse 88   Temp 97.8 °F (36.6 °C) (Temporal)   Ht 4' 11.5\" (1.511 m)   Wt 74.6 kg (164 lb 6.4 oz)   LMP 2010 (Approximate)   SpO2 100%   BMI 32.65 kg/m²      Physical Exam  Vitals and nursing note reviewed.   Constitutional:       General: She is not in acute distress.     Appearance: She is well-developed. She is obese. She is not diaphoretic.   HENT:      Head: Normocephalic and atraumatic.      Right Ear: External ear normal.      Left Ear: External ear normal.   Eyes:      Conjunctiva/sclera: Conjunctivae normal.   Cardiovascular:      Rate and Rhythm: Normal rate and regular rhythm.      Heart sounds: Normal heart sounds. No " murmur heard.     No friction rub. No gallop.   Pulmonary:      Effort: Pulmonary effort is normal. No respiratory distress.      Breath sounds: Normal breath sounds. No stridor. No wheezing or rales.   Musculoskeletal:      Right lower leg: No edema.      Left lower leg: No edema.   Neurological:      General: No focal deficit present.      Mental Status: She is alert.

## 2025-02-26 ENCOUNTER — VBI (OUTPATIENT)
Dept: ADMINISTRATIVE | Facility: OTHER | Age: 65
End: 2025-02-26

## 2025-02-26 NOTE — TELEPHONE ENCOUNTER
02/26/25 8:12 AM     Chart reviewed for   Cervical Cancer Screening    ; nothing is submitted to the patient's insurance at this time.     TYRONE MUHAMMAD MA   [facility]

## 2025-04-16 DIAGNOSIS — F51.04 PSYCHOPHYSIOLOGICAL INSOMNIA: ICD-10-CM

## 2025-04-16 RX ORDER — TRAZODONE HYDROCHLORIDE 50 MG/1
50 TABLET ORAL
Qty: 30 TABLET | Refills: 1 | Status: SHIPPED | OUTPATIENT
Start: 2025-04-16

## 2025-05-16 DIAGNOSIS — F41.1 GAD (GENERALIZED ANXIETY DISORDER): ICD-10-CM

## 2025-05-16 RX ORDER — BUPROPION HYDROCHLORIDE 300 MG/1
300 TABLET ORAL DAILY
Qty: 90 TABLET | Refills: 1 | Status: SHIPPED | OUTPATIENT
Start: 2025-05-16

## 2025-05-29 ENCOUNTER — VBI (OUTPATIENT)
Dept: ADMINISTRATIVE | Facility: OTHER | Age: 65
End: 2025-05-29

## 2025-05-29 NOTE — TELEPHONE ENCOUNTER
05/29/25 11:18 AM     Chart reviewed for Mammogram ; nothing is submitted to the patient's insurance at this time.     Sonya Naylor MA   [facility]

## 2025-07-11 ENCOUNTER — HOSPITAL ENCOUNTER (OUTPATIENT)
Dept: MAMMOGRAPHY | Facility: CLINIC | Age: 65
End: 2025-07-11
Payer: COMMERCIAL

## 2025-07-11 DIAGNOSIS — Z12.31 SCREENING MAMMOGRAM FOR BREAST CANCER: ICD-10-CM

## 2025-07-11 PROCEDURE — 77063 BREAST TOMOSYNTHESIS BI: CPT

## 2025-07-11 PROCEDURE — 77067 SCR MAMMO BI INCL CAD: CPT

## 2025-07-22 DIAGNOSIS — I10 PRIMARY HYPERTENSION: ICD-10-CM

## 2025-07-23 RX ORDER — HYDROCHLOROTHIAZIDE 25 MG/1
25 TABLET ORAL DAILY
Qty: 30 TABLET | Refills: 0 | Status: SHIPPED | OUTPATIENT
Start: 2025-07-23

## 2025-08-20 ENCOUNTER — OFFICE VISIT (OUTPATIENT)
Dept: FAMILY MEDICINE CLINIC | Facility: CLINIC | Age: 65
End: 2025-08-20
Payer: COMMERCIAL

## 2025-08-20 VITALS
BODY MASS INDEX: 31.22 KG/M2 | WEIGHT: 159 LBS | DIASTOLIC BLOOD PRESSURE: 82 MMHG | OXYGEN SATURATION: 99 % | SYSTOLIC BLOOD PRESSURE: 124 MMHG | TEMPERATURE: 97.9 F | HEIGHT: 60 IN | HEART RATE: 78 BPM

## 2025-08-20 DIAGNOSIS — E53.8 VITAMIN B12 DEFICIENCY: ICD-10-CM

## 2025-08-20 DIAGNOSIS — R73.9 HYPERGLYCEMIA: ICD-10-CM

## 2025-08-20 DIAGNOSIS — Z78.0 POSTMENOPAUSAL ESTROGEN DEFICIENCY: ICD-10-CM

## 2025-08-20 DIAGNOSIS — Z13.220 LIPID SCREENING: ICD-10-CM

## 2025-08-20 DIAGNOSIS — Z00.00 ANNUAL PHYSICAL EXAM: Primary | ICD-10-CM

## 2025-08-20 DIAGNOSIS — M81.0 OSTEOPOROSIS WITHOUT CURRENT PATHOLOGICAL FRACTURE, UNSPECIFIED OSTEOPOROSIS TYPE: ICD-10-CM

## 2025-08-20 DIAGNOSIS — E61.1 IRON DEFICIENCY: ICD-10-CM

## 2025-08-20 PROCEDURE — 99386 PREV VISIT NEW AGE 40-64: CPT | Performed by: FAMILY MEDICINE

## 2025-08-20 PROCEDURE — 96372 THER/PROPH/DIAG INJ SC/IM: CPT | Performed by: FAMILY MEDICINE

## 2025-08-20 RX ADMIN — CYANOCOBALAMIN 1000 MCG: 1000 INJECTION, SOLUTION INTRAMUSCULAR; SUBCUTANEOUS at 07:49

## (undated) DEVICE — GLOVE SRG BIOGEL 8.5

## (undated) DEVICE — SKIN MARKER DUAL TIP WITH RULER CAP, FLEXIBLE RULER AND LABELS: Brand: DEVON

## (undated) DEVICE — TIBURON EXTREMITY SHEET: Brand: CONVERTORS

## (undated) DEVICE — BASIC DOUBLE BASIN 2-LF: Brand: MEDLINE INDUSTRIES, INC.

## (undated) DEVICE — HOOD: Brand: FLYTE, SURGICOOL

## (undated) DEVICE — SUT STRATAFIX SPIRAL 3-0 PGA/PCL 30 X 30 CM SXMD2B410

## (undated) DEVICE — ADHESIVE SKIN CLOSR DERMABOND PRINEO

## (undated) DEVICE — HANDPIECE SET WITH HIGH FLOW TIP AND SUCTION TUBE: Brand: INTERPULSE

## (undated) DEVICE — GLOVE INDICATOR PI UNDERGLOVE SZ 7.5 BLUE

## (undated) DEVICE — SUT STRATAFIX SPIRAL 1-0  CT-1 30 X 30CM SXPD2B403

## (undated) DEVICE — 3M™ IOBAN™ 2 ANTIMICROBIAL INCISE DRAPE 6650EZ: Brand: IOBAN™ 2

## (undated) DEVICE — REPEL LITE CUT REST SURGICAL GLV LNRS X-LG: Brand: REPEL

## (undated) DEVICE — INSTRUMENT POUCH: Brand: CONVERTORS

## (undated) DEVICE — PREP SURGICAL PURPREP 26ML

## (undated) DEVICE — DRAPE SHEET X-LG

## (undated) DEVICE — ANTIBACTERIAL UNDYED BRAIDED (POLYGLACTIN 910), SYNTHETIC ABSORBABLE SUTURE: Brand: COATED VICRYL

## (undated) DEVICE — TRAY FOLEY 16FR URIMETER SURESTEP

## (undated) DEVICE — INTENDED FOR TISSUE SEPARATION, AND OTHER PROCEDURES THAT REQUIRE A SHARP SURGICAL BLADE TO PUNCTURE OR CUT.: Brand: BARD-PARKER ® CARBON RIB-BACK BLADES

## (undated) DEVICE — GLOVE INDICATOR PI UNDERGLOVE SZ 8.5 BLUE

## (undated) DEVICE — ORTHOPEDIC PACK: Brand: CARDINAL HEALTH

## (undated) DEVICE — DRESSING MEPILEX AG BORDER 4 X 12 IN

## (undated) DEVICE — 3M™ STERI-DRAPE™ U-DRAPE 1015: Brand: STERI-DRAPE™

## (undated) DEVICE — DUAL CUT SAGITTAL BLADE

## (undated) DEVICE — LIGHT GLOVE GREEN

## (undated) DEVICE — SUT VICRYL 0 CP-1 27 IN J267H

## (undated) DEVICE — GLOVE SRG BIOGEL 8

## (undated) DEVICE — BANDAGE, ESMARK LF STR 6"X9' (20/CS): Brand: CYPRESS

## (undated) DEVICE — REPEL CUT REST SURGICAL GLV LNRS LG: Brand: REPEL

## (undated) DEVICE — CAPIT KNEE ATTUNE FB CEMENT - DEPUY

## (undated) DEVICE — CUFF TOURNIQUET 34 X 4 IN QUICK CONNECT DISP 1BLA

## (undated) DEVICE — ASTOUND SURGICAL GOWN, XXX LARGE, X-LONG: Brand: CONVERTORS

## (undated) DEVICE — CHLORAPREP HI-LITE 26ML ORANGE

## (undated) DEVICE — LINER FOOT PAD STERILE DISPOSABLE

## (undated) DEVICE — SPINNING CEMENT MIXING BOWL